# Patient Record
Sex: FEMALE | Race: WHITE | NOT HISPANIC OR LATINO | Employment: OTHER | URBAN - METROPOLITAN AREA
[De-identification: names, ages, dates, MRNs, and addresses within clinical notes are randomized per-mention and may not be internally consistent; named-entity substitution may affect disease eponyms.]

---

## 2018-05-11 ENCOUNTER — HOSPITAL ENCOUNTER (INPATIENT)
Facility: CLINIC | Age: 73
LOS: 3 days | Discharge: NURSING, REHAB OR LONG-TERM CARE | DRG: 603 | End: 2018-05-14
Attending: EMERGENCY MEDICINE | Admitting: INTERNAL MEDICINE
Payer: MEDICARE

## 2018-05-11 DIAGNOSIS — M89.9 DISORDER OF BONE AND CARTILAGE: ICD-10-CM
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
## 2018-05-11 DIAGNOSIS — L03.90 CELLULITIS: ICD-10-CM

## 2018-05-11 DIAGNOSIS — M94.9 DISORDER OF BONE AND CARTILAGE: ICD-10-CM

## 2018-05-11 LAB
ANION GAP SERPL CALCULATED.3IONS-SCNC: 7 MMOL/L (ref 3–14)
BASOPHILS # BLD AUTO: 0 10E9/L (ref 0–0.2)
BASOPHILS NFR BLD AUTO: 0.1 %
BUN SERPL-MCNC: 12 MG/DL (ref 7–30)
CALCIUM SERPL-MCNC: 9.6 MG/DL (ref 8.5–10.1)
CHLORIDE SERPL-SCNC: 104 MMOL/L (ref 94–109)
CO2 SERPL-SCNC: 27 MMOL/L (ref 20–32)
CREAT SERPL-MCNC: 0.81 MG/DL (ref 0.52–1.04)
CRP SERPL-MCNC: 86.8 MG/L (ref 0–8)
DIFFERENTIAL METHOD BLD: ABNORMAL
EOSINOPHIL # BLD AUTO: 0 10E9/L (ref 0–0.7)
EOSINOPHIL NFR BLD AUTO: 0.1 %
ERYTHROCYTE [DISTWIDTH] IN BLOOD BY AUTOMATED COUNT: 13.1 % (ref 10–15)
ERYTHROCYTE [SEDIMENTATION RATE] IN BLOOD BY WESTERGREN METHOD: 15 MM/H (ref 0–30)
GFR SERPL CREATININE-BSD FRML MDRD: 69 ML/MIN/1.7M2
GLUCOSE SERPL-MCNC: 100 MG/DL (ref 70–99)
HCT VFR BLD AUTO: 38 % (ref 35–47)
HGB BLD-MCNC: 12.5 G/DL (ref 11.7–15.7)
IMM GRANULOCYTES # BLD: 0 10E9/L (ref 0–0.4)
IMM GRANULOCYTES NFR BLD: 0.2 %
LACTATE BLD-SCNC: 1.9 MMOL/L (ref 0.7–2)
LYMPHOCYTES # BLD AUTO: 1 10E9/L (ref 0.8–5.3)
LYMPHOCYTES NFR BLD AUTO: 7.9 %
MCH RBC QN AUTO: 30.3 PG (ref 26.5–33)
MCHC RBC AUTO-ENTMCNC: 32.9 G/DL (ref 31.5–36.5)
MCV RBC AUTO: 92 FL (ref 78–100)
MONOCYTES # BLD AUTO: 0.8 10E9/L (ref 0–1.3)
MONOCYTES NFR BLD AUTO: 6.9 %
NEUTROPHILS # BLD AUTO: 10.3 10E9/L (ref 1.6–8.3)
NEUTROPHILS NFR BLD AUTO: 84.8 %
NRBC # BLD AUTO: 0 10*3/UL
NRBC BLD AUTO-RTO: 0 /100
PLATELET # BLD AUTO: 292 10E9/L (ref 150–450)
POTASSIUM SERPL-SCNC: 3.6 MMOL/L (ref 3.4–5.3)
RBC # BLD AUTO: 4.12 10E12/L (ref 3.8–5.2)
SODIUM SERPL-SCNC: 138 MMOL/L (ref 133–144)
WBC # BLD AUTO: 12.1 10E9/L (ref 4–11)

## 2018-05-11 PROCEDURE — 25000125 ZZHC RX 250: Performed by: EMERGENCY MEDICINE

## 2018-05-11 PROCEDURE — 85025 COMPLETE CBC W/AUTO DIFF WBC: CPT | Performed by: EMERGENCY MEDICINE

## 2018-05-11 PROCEDURE — 25000132 ZZH RX MED GY IP 250 OP 250 PS 637: Mod: GY | Performed by: INTERNAL MEDICINE

## 2018-05-11 PROCEDURE — 86140 C-REACTIVE PROTEIN: CPT | Performed by: EMERGENCY MEDICINE

## 2018-05-11 PROCEDURE — 96375 TX/PRO/DX INJ NEW DRUG ADDON: CPT

## 2018-05-11 PROCEDURE — 85652 RBC SED RATE AUTOMATED: CPT | Performed by: EMERGENCY MEDICINE

## 2018-05-11 PROCEDURE — 83605 ASSAY OF LACTIC ACID: CPT | Performed by: EMERGENCY MEDICINE

## 2018-05-11 PROCEDURE — 87040 BLOOD CULTURE FOR BACTERIA: CPT | Performed by: EMERGENCY MEDICINE

## 2018-05-11 PROCEDURE — 25000128 H RX IP 250 OP 636

## 2018-05-11 PROCEDURE — 96367 TX/PROPH/DG ADDL SEQ IV INF: CPT

## 2018-05-11 PROCEDURE — 99222 1ST HOSP IP/OBS MODERATE 55: CPT | Mod: AI | Performed by: INTERNAL MEDICINE

## 2018-05-11 PROCEDURE — 96365 THER/PROPH/DIAG IV INF INIT: CPT

## 2018-05-11 PROCEDURE — 25000128 H RX IP 250 OP 636: Performed by: INTERNAL MEDICINE

## 2018-05-11 PROCEDURE — 80048 BASIC METABOLIC PNL TOTAL CA: CPT | Performed by: EMERGENCY MEDICINE

## 2018-05-11 PROCEDURE — 25000128 H RX IP 250 OP 636: Performed by: EMERGENCY MEDICINE

## 2018-05-11 PROCEDURE — 12000000 ZZH R&B MED SURG/OB

## 2018-05-11 PROCEDURE — 25000125 ZZHC RX 250: Performed by: INTERNAL MEDICINE

## 2018-05-11 PROCEDURE — A9270 NON-COVERED ITEM OR SERVICE: HCPCS | Mod: GY | Performed by: INTERNAL MEDICINE

## 2018-05-11 PROCEDURE — 36415 COLL VENOUS BLD VENIPUNCTURE: CPT

## 2018-05-11 PROCEDURE — 99285 EMERGENCY DEPT VISIT HI MDM: CPT | Mod: 25

## 2018-05-11 RX ORDER — LEVOTHYROXINE SODIUM 88 UG/1
88 TABLET ORAL DAILY
Status: DISCONTINUED | OUTPATIENT
Start: 2018-05-12 | End: 2018-05-14 | Stop reason: HOSPADM

## 2018-05-11 RX ORDER — ONDANSETRON 2 MG/ML
4 INJECTION INTRAMUSCULAR; INTRAVENOUS EVERY 6 HOURS PRN
Status: DISCONTINUED | OUTPATIENT
Start: 2018-05-11 | End: 2018-05-14 | Stop reason: HOSPADM

## 2018-05-11 RX ORDER — NALOXONE HYDROCHLORIDE 0.4 MG/ML
.1-.4 INJECTION, SOLUTION INTRAMUSCULAR; INTRAVENOUS; SUBCUTANEOUS
Status: DISCONTINUED | OUTPATIENT
Start: 2018-05-11 | End: 2018-05-14 | Stop reason: HOSPADM

## 2018-05-11 RX ORDER — ASCORBIC ACID/MULTIVIT-MIN 1000 MG
1 EFFERVESCENT POWDER IN PACKET ORAL DAILY
Status: ON HOLD | COMMUNITY
End: 2022-01-01

## 2018-05-11 RX ORDER — CEFAZOLIN SODIUM 1 G/3ML
1 INJECTION, POWDER, FOR SOLUTION INTRAMUSCULAR; INTRAVENOUS ONCE
Status: DISCONTINUED | OUTPATIENT
Start: 2018-05-11 | End: 2018-05-11

## 2018-05-11 RX ORDER — AMOXICILLIN 250 MG
1 CAPSULE ORAL 2 TIMES DAILY PRN
Status: DISCONTINUED | OUTPATIENT
Start: 2018-05-11 | End: 2018-05-14 | Stop reason: HOSPADM

## 2018-05-11 RX ORDER — ACETAMINOPHEN 325 MG/1
650 TABLET ORAL EVERY 4 HOURS PRN
Status: DISCONTINUED | OUTPATIENT
Start: 2018-05-11 | End: 2018-05-14 | Stop reason: HOSPADM

## 2018-05-11 RX ORDER — VITAMIN E 268 MG
400 CAPSULE ORAL DAILY
Status: DISCONTINUED | OUTPATIENT
Start: 2018-05-12 | End: 2018-05-14 | Stop reason: HOSPADM

## 2018-05-11 RX ORDER — ASPIRIN 81 MG/1
81 TABLET ORAL DAILY
Status: DISCONTINUED | OUTPATIENT
Start: 2018-05-12 | End: 2018-05-14 | Stop reason: HOSPADM

## 2018-05-11 RX ORDER — BUPROPION HYDROCHLORIDE 75 MG/1
150 TABLET ORAL EVERY MORNING
Status: DISCONTINUED | OUTPATIENT
Start: 2018-05-12 | End: 2018-05-14 | Stop reason: HOSPADM

## 2018-05-11 RX ORDER — SODIUM CHLORIDE 9 MG/ML
INJECTION, SOLUTION INTRAVENOUS CONTINUOUS
Status: DISCONTINUED | OUTPATIENT
Start: 2018-05-11 | End: 2018-05-14 | Stop reason: HOSPADM

## 2018-05-11 RX ORDER — POLYETHYLENE GLYCOL 3350 17 G/17G
17 POWDER, FOR SOLUTION ORAL DAILY PRN
Status: DISCONTINUED | OUTPATIENT
Start: 2018-05-11 | End: 2018-05-14 | Stop reason: HOSPADM

## 2018-05-11 RX ORDER — IBUPROFEN 600 MG/1
600 TABLET, FILM COATED ORAL EVERY 6 HOURS PRN
Status: DISCONTINUED | OUTPATIENT
Start: 2018-05-11 | End: 2018-05-14 | Stop reason: HOSPADM

## 2018-05-11 RX ORDER — OXYCODONE AND ACETAMINOPHEN 5; 325 MG/1; MG/1
1-2 TABLET ORAL EVERY 4 HOURS PRN
Status: DISCONTINUED | OUTPATIENT
Start: 2018-05-11 | End: 2018-05-14 | Stop reason: HOSPADM

## 2018-05-11 RX ORDER — CEFTRIAXONE 2 G/1
2 INJECTION, POWDER, FOR SOLUTION INTRAMUSCULAR; INTRAVENOUS EVERY 24 HOURS
Status: DISCONTINUED | OUTPATIENT
Start: 2018-05-12 | End: 2018-05-14

## 2018-05-11 RX ORDER — MORPHINE SULFATE 4 MG/ML
2 INJECTION, SOLUTION INTRAMUSCULAR; INTRAVENOUS ONCE
Status: COMPLETED | OUTPATIENT
Start: 2018-05-11 | End: 2018-05-11

## 2018-05-11 RX ORDER — ONDANSETRON 4 MG/1
4 TABLET, ORALLY DISINTEGRATING ORAL EVERY 6 HOURS PRN
Status: DISCONTINUED | OUTPATIENT
Start: 2018-05-11 | End: 2018-05-14 | Stop reason: HOSPADM

## 2018-05-11 RX ORDER — CEFTRIAXONE 1 G/1
1 INJECTION, POWDER, FOR SOLUTION INTRAMUSCULAR; INTRAVENOUS ONCE
Status: COMPLETED | OUTPATIENT
Start: 2018-05-11 | End: 2018-05-11

## 2018-05-11 RX ORDER — AMOXICILLIN 250 MG
2 CAPSULE ORAL 2 TIMES DAILY PRN
Status: DISCONTINUED | OUTPATIENT
Start: 2018-05-11 | End: 2018-05-14 | Stop reason: HOSPADM

## 2018-05-11 RX ORDER — MORPHINE SULFATE 4 MG/ML
INJECTION, SOLUTION INTRAMUSCULAR; INTRAVENOUS
Status: COMPLETED
Start: 2018-05-11 | End: 2018-05-11

## 2018-05-11 RX ORDER — MORPHINE SULFATE 4 MG/ML
2-4 INJECTION, SOLUTION INTRAMUSCULAR; INTRAVENOUS
Status: DISCONTINUED | OUTPATIENT
Start: 2018-05-11 | End: 2018-05-14 | Stop reason: HOSPADM

## 2018-05-11 RX ADMIN — OXYCODONE HYDROCHLORIDE AND ACETAMINOPHEN 1 TABLET: 5; 325 TABLET ORAL at 18:13

## 2018-05-11 RX ADMIN — METRONIDAZOLE 500 MG: 500 INJECTION, SOLUTION INTRAVENOUS at 22:58

## 2018-05-11 RX ADMIN — MORPHINE SULFATE 2 MG: 4 INJECTION INTRAVENOUS at 12:32

## 2018-05-11 RX ADMIN — TRAZODONE HYDROCHLORIDE 150 MG: 100 TABLET ORAL at 22:58

## 2018-05-11 RX ADMIN — METRONIDAZOLE 500 MG: 500 INJECTION, SOLUTION INTRAVENOUS at 13:32

## 2018-05-11 RX ADMIN — SODIUM CHLORIDE, PRESERVATIVE FREE: 5 INJECTION INTRAVENOUS at 16:26

## 2018-05-11 RX ADMIN — MORPHINE SULFATE 2 MG: 4 INJECTION INTRAVENOUS at 12:52

## 2018-05-11 RX ADMIN — ENOXAPARIN SODIUM 40 MG: 40 INJECTION SUBCUTANEOUS at 18:13

## 2018-05-11 RX ADMIN — CEFTRIAXONE 1 G: 1 INJECTION, POWDER, FOR SOLUTION INTRAMUSCULAR; INTRAVENOUS at 12:52

## 2018-05-11 ASSESSMENT — ENCOUNTER SYMPTOMS
FEVER: 0
COUGH: 0
DIARRHEA: 0
COLOR CHANGE: 1
VOMITING: 0
WOUND: 1

## 2018-05-11 ASSESSMENT — ACTIVITIES OF DAILY LIVING (ADL)
SWALLOWING: 0-->SWALLOWS FOODS/LIQUIDS WITHOUT DIFFICULTY
AMBULATION: 1-->ASSISTIVE EQUIPMENT
FALL_HISTORY_WITHIN_LAST_SIX_MONTHS: YES
BATHING: 1-->ASSISTIVE EQUIPMENT
COGNITION: 0 - NO COGNITION ISSUES REPORTED
DRESS: 0-->INDEPENDENT
TRANSFERRING: 1-->ASSISTIVE EQUIPMENT
TOILETING: 1-->ASSISTIVE EQUIPMENT
NUMBER_OF_TIMES_PATIENT_HAS_FALLEN_WITHIN_LAST_SIX_MONTHS: 1
RETIRED_COMMUNICATION: 0-->UNDERSTANDS/COMMUNICATES WITHOUT DIFFICULTY
RETIRED_EATING: 0-->INDEPENDENT

## 2018-05-11 NOTE — ED NOTES
"[facility]  ED Nurse Handoff Report    ED Chief complaint: Dog Bite (Bit by a dog maybe 3 days ago. has been on antibiotics but has increased swelling )      ED Diagnosis:   Final diagnoses:   None       Code Status: Full Code    Allergies: No Known Allergies    Activity level - Baseline/Home:  Independent    Activity Level - Current:   Independent     Needed?: No    Isolation: No  Infection: Not Applicable    Bariatric?: No    Vital Signs:   Vitals:    05/11/18 1200 05/11/18 1245 05/11/18 1251   BP: 150/69     Resp: 16     Temp: 97.2  F (36.2  C)     TempSrc: Oral     SpO2: 99% 94% 100%   Weight: 49.9 kg (110 lb)     Height: 1.626 m (5' 4\")         Cardiac Rhythm: ,        Pain level: 0-10 Pain Scale: 5    Is this patient confused?: No     Patient Report: Initial Complaint: dog bite  Focused Assessment: Reports R hand pain after dog bite last night. Was seen at  last night, sutured and XR'd which showed 4th metacarpal fx.  Increased swelling, pain and limited movement of fingers this AM.  Started on rocephin and flagyl.  BC x2 pending in lab.  Ortho consulted, no plan for OR will continue AB course.  Placed in a splint in ED.  Morphine for pain.   Tests Performed: blood work  Abnormal Results: WBC 12.1, CRP 86.8  Treatments provided: pain control, splint, AB    Family Comments: friend @ bedside    OBS brochure/video discussed/provided to patient: N/A    ED Medications:   Medications   cefTRIAXone (ROCEPHIN) 1 g vial to attach to  mL bag for ADULTS or NS 50 mL bag for PEDS (1 g Intravenous New Bag 5/11/18 1252)   metroNIDAZOLE (FLAGYL) infusion 500 mg (not administered)   morphine (PF) injection 2 mg (2 mg Intravenous Given 5/11/18 1252)   morphine (PF) 4 MG/ML injection (2 mg  Given 5/11/18 1232)       Drips infusing?:  Yes    For the majority of the shift this patient was Green.   Interventions performed were comfort measures.    Severe Sepsis OR Septic Shock Diagnosis Present: " No      ED NURSE PHONE NUMBER: *[id number]

## 2018-05-11 NOTE — IP AVS SNAPSHOT
` `           [place] [address] [facility]: [phone]                 INTERAGENCY TRANSFER FORM - NOTES (H&P, Discharge Summary, Consults, Procedures, Therapies)   2018                    Hospital Admission Date: 2018  DARIAN ANDUJAR   : 1945  Sex: Female        Patient PCP Information     Provider PCP Type    Patria Shea MD General         History & Physicals      H&P by Soniya Patel MD at 2018  1:40 PM     Author:  Soniya Patel MD Service:  Hospitalist Author Type:  Physician    Filed:  2018  1:58 PM Date of Service:  2018  1:40 PM Creation Time:  2018  1:35 PM    Status:  Signed :  Soniya Patel MD (Physician)         [facility]    History and Physical  Hospitalist       Date of Admission:  2018[PK1.1]    Assessment & Plan[PK1.2]   Darian Andujar is a 73 year old female with history of depression, osteoporosis, hypothyroidism who presents  after a dog bite, yesterday and pain and swelling in her arms and hands.    Right arm and hand cellulitis after a dog bite  - pictures in EPIC ,seen by hand surgery and recommending-Splint and IV antibiotics  -Ceftriaxone and Flagyl initiated in the ED, continue  - pain control with oxycodone, Tylenol and IV morphine as needed, bowel regimen in place   History of depression  -resume PTA antidepressant ,once verified by pharmacy     Osteoporosis  -Patient had recent fall and hip fracture.  Currently uses walker.  PT OT to follow while inhouse    Hypothyroidism  -Resume PTA Synthroid once verified by pharmacy    # Pain Assessment:[PK1.1]  Current Pain Score 2018   Pain score (0-10) 5[PK1.2]   - Darian is experiencing pain due to Dog bite and cellulitis. Pain management was discussed and the plan was created in a collaborative fashion.  Darian's response to the current recommendations: engaged  - Opioid regimen: IV morphine, oxycodone  - Response to opioid medications: Reduction of symptoms  .  - Bowel regimen: miralax and docusate        DVT Prophylaxis: Enoxaparin (Lovenox) SQ  Code Status: Full Code    Disposition: Expected discharge in 2-3 days once clinical improvement and clearance by hand surgery..[PK1.1]    Soniya Patel[PK1.2], MD[PK1.1]    Primary Care Physician   [facility]    Chief Complaint[PK1.2]   Hand pain and swelling    History is obtained from the patient[PK1.1]    History of Present Illness[PK1.2]   Mariposa Mendez is a 73 year old female with history of depression, osteoporosis, hypothyroidism who presents  after a dog bite, yesterday and pain and swelling in her arms and hands.      The patient had a dog bite by a neighbor's dog(who is upto date on vaccination) yesterday.  She went to urgent care yesterday.   Her wound were repaired with Dermabond.  She was given tetanus shots and she was prescribed Augmentin, however had not started so far.  In the afternoon she started experiencing increased pain and swelling in her arm.  She was unable to sleep last night.  So she presented to the ED today.  Patient denies any fever, nausea vomiting, chest pain, headache, dizziness, lightheadedness, changes in bowel or bladder habits.    In the ED she was seen by Dr Allison and Dr Muñoz(hand surgery). Labs positive for mild leukocytosis of 12.1.  Vitals were unremarkable except for some minimal hypertension.  Blood cultures were sent and she was started on ceftriaxone and Flagyl upon recommendation from surgery.[PK1.1]    Past Medical History[PK1.2]    I have reviewed this patient's medical history and updated it with pertinent information if needed.[PK1.1]   Past Medical History:   Diagnosis Date     Depressive disorder, not elsewhere classified      Generalized osteoarthrosis, unspecified site      OSTEOPENIA OF HIP      Other and unspecified hyperlipidemia      Unspecified hypothyroidism[PK1.3]        Past Surgical History[PK1.2]   I have reviewed this patient's surgical history and  updated it with pertinent information if needed.[PK1.1]  Past Surgical History:   Procedure Laterality Date     SURGICAL HISTORY OF -   age 39 or 40    SUSANNAH and unilateral ooph (fibroids)     SURGICAL HISTORY OF -   2005    right foot surgery with hardware[PK1.3]       Prior to Admission Medications   Prior to Admission Medications   Prescriptions Last Dose Informant Patient Reported? Taking?   ASPIRIN 81 MG OR TABS   No No   Si tab po QD (Once per day)   CELEXA 20 MG OR TABS   Yes No   Si TABLET DAILY   LEVOXYL 100 MCG OR TABS   No No   Sig: ONE DAILY IN THE MORNING   TRAZODONE HCL 50 MG OR TABS   No No   Si - 2 TABs PO at bedtime prn insomnia      Facility-Administered Medications: None     Allergies   No Known Allergies    Social History[PK1.2]   I have reviewed this patient's social history and updated it with pertinent information if needed. Mariposa Mendez[PK1.1]  reports that she has been smoking Cigarettes.  She has a 15.00 pack-year smoking history. She has never used smokeless tobacco. She reports that she drinks alcohol.[PK1.3]    Family History[PK1.2]   I have reviewed this patient's family history and updated it with pertinent information if needed.[PK1.1]   Family History   Problem Relation Age of Onset     Breast Cancer Mother      Dx age 70     Arthritis Mother      OA     Arthritis Maternal Grandfather      Lipids Mother      Lipids Sister      Ericka. 1/2 sibling     Hypertension Mother      Thyroid Disease Sister      Ercika. 1/2 sibling     Psychotic Disorder Brother      Bipolar. (1/2 sibling)     Family History Negative Daughter      Lipids Sister      Marian.      Psychotic Disorder Mother      depression     Psychotic Disorder Sister      Ericka. 1/2 sib. Depression     Psychotic Disorder Sister      Marian. 1/2 sib. Depression[PK1.4]       Review of Systems[PK1.2]   The 10 point Review of Systems is negative other than noted in the HPI or here.   Patient had a recent hip fracture  after a fall and currently uses a walker for ambulation.[PK1.1]    Physical Exam   Temp: 97.2  F (36.2  C) Temp src: Oral BP: 150/69   Heart Rate: 72 Resp: 16 SpO2: 100 % O2 Device: None (Room air)[PK1.2]    Vital Signs with Ranges[PK1.1]  Temp:  [97.2  F (36.2  C)] 97.2  F (36.2  C)  Heart Rate:  [72] 72  Resp:  [16] 16  BP: (150)/(69) 150/69  SpO2:  [94 %-100 %] 100 %  110 lbs 0 oz[PK1.2]    Exam:  Constitutional: Awake, alert and no distress. Appears comfortable  Head: Normocephalic. No masses, lesions, tenderness or abnormalities  ENT: ENT exam normal, no neck nodes or sinus tenderness  Cardiovascular: RRR.  no murmurs, no rubs or JVD  Respiratory:normal WOB,b/l equal air entry, no wheezes or crackles   Gastrointestinal: Abdomen soft, non-tender. BS normal. No masses, organomegaly  : Deferred   extremities :erythema and swelling in the right arm and hand( see pictures in ED provider note)- was already splinted by the time she was evaluated by me,no edema , no clubbing or cyanosis    Musculoakeletal :right wrist joint swelling otherwise no , erythema . No obvious deformity   Skin: Warm and dry, no rash  Neurologic: Cranial nerves II-XII grossly intact , power 5/5, Reflexes normal and symmetric. Sensation grossly WNL.[PK1.1]    Data[PK1.2]   Data reviewed today:  I personally reviewed the hand image(s) showing There is moderately severe soft tissue swelling of the dorsal aspect of the hand with air within the soft tissues. Additional more mild swelling and air within the soft tissues of the dorsal distal forearm. There is a minimally displaced oblique fracture involving the fourth metacarpal proximally without definitive intra-articular extension. No additional acute fracture or dislocation. Advanced degenerative changes of the base of the thumb and second and third distal interphalangeal joints additional scattered more mild to moderate degenerative changes of the hand and wrist. Dense sclerotic foci adjacent  to the ulnar styloid process may be due to old injury..from care everywhere[PK1.1]    Recent Labs  Lab 05/11/18  1220   WBC 12.1*   HGB 12.5   MCV 92         POTASSIUM 3.6   CHLORIDE 104   CO2 27   BUN 12   CR 0.81   ANIONGAP 7   JAYE 9.6   *       No results found for this or any previous visit (from the past 24 hour(s)).[PK1.2]     Revision History        User Key Date/Time User Provider Type Action    > PK1.4 5/11/2018  1:58 PM Soniya Patel MD Physician Sign     PK1.3 5/11/2018  1:50 PM Soniya Patel MD Physician      PK1.2 5/11/2018  1:40 PM Soniya Patel MD Physician      PK1.1 5/11/2018  1:35 PM Soniya Patel MD Physician                   Discharge Summaries     No notes of this type exist for this encounter.         Consult Notes      Consults signed by Isaac Muñoz MD at 5/11/2018  3:54 PM      Author:  Isaac Muñoz MD Service:  Orthopedics Author Type:  Physician    Filed:  5/11/2018  3:54 PM Date of Service:  5/11/2018 12:48 PM Creation Time:  5/11/2018  1:13 PM    Status:  Signed :  Isaac Muñoz MD (Physician)         Consult Date:  05/11/2018      CHIEF COMPLAINT:  Right hand pain.      HISTORY OF PRESENT ILLNESS:  Mariposa Mendez is a 73-year-old right-handed retired woman.  I have been asked to see her at the request of Dr. Farnaz Allison for a hand surgical consultation regarding a problem with her right hand.      Last night this lady was bitten by her neighbor's dog over multiple spots involving her right hand.  She was seen at a local urgent care clinic this morning where her wounds were cleansed, a dorsal wrist laceration was sutured and Dermabond was applied to the remainder of her wounds.  She was not given any antibiotics.  X-rays also reveal a fourth metacarpal fracture.      Nevertheless, over the last 24 hours the patient has noted increasing pain, redness and swelling in her hand.  She presents to the emergency room at this time for more definitive  treatment.      At the present time, Ms. Mendez complains of rather diffuse pain in her hand and some nonspecific tingling along its dorsal aspect.  This was an isolated injury.  No prior problems with the area.      PAST MEDICAL HISTORY:  Depression, osteoarthritis, hyperlipidemia, hypothyroidism.      MEDICATIONS ON ADMISSION:   1.  Aspirin.   2.  Celexa.   3.  Levoxyl.   4.  Trazodone.      ALLERGIES:  NO KNOWN DRUG ALLERGIES.      PRIOR SURGERIES:  Hysterectomy and some type of operation on her right foot.      I reviewed this lady's patient profile, complete review of systems,  family and social histories as documented in the EMR and in her emergency room notes.      PHYSICAL EXAMINATION:   GENERAL:  Reveals a 73-year-old right-handed woman who is alert and oriented to time, place and person.  Skin is warm and dry.  She is in mild distress because of right hand pain.   VITAL SIGNS:  Temperature 97.2, blood pressure 150/69, pulse 72, respirations 16.   LUNGS: There is no pulmonary difficulty.     EXTREMITIES: No adenopathy in the right upper extremity.  Examination  of the right hand reveals diffuse low-grade swelling along the dorsal aspect.  There is some nonspecific erythema on the dorsal hand extending up into the dorsal proximal forearm.  She has a neatly-sutured 3 cm transverse laceration along the dorsal wrist without drainage.  There are multiple small puncture wounds both dorsally and volarly in the hand without significant drainage.  Swelling appears more interstitial rather than fluctuant.  Swelling extends out into the fingers, and both digital and wrist motion are limited by pain.  Neurovascular exam is intact.  The left hand has no swelling, tenderness or limitation of motion.     LAB:  WBC  12.1    IMAGING:  X-rays of the right hand (3 views) taken at [facility] urgent care earlier today demonstrate a minimally-angulated fracture of the proximal ring metacarpal.      DIAGNOSES:     1.  Dog  bite, right hand, with cellulitis.     2.  Right IV metacarpal fracture.      At the present time, I believe that this patient's condition is best managed with a course of IV antibiotics.  I spoke with the ER physician about this.  We will start with some IV Rocephin and Flagyl.  We will splint her hand.  This is not an open fracture, and I do not believe at the present time that anything requires debridement.      Ms. Andujar will elevate her hand, and I will see her in followup to monitor her clinical course.         JAROD LR MD             D: 2018   T: 2018   MT: [id number]      Name:     DARIAN ADNUJAR   MRN:      [id number]        Account:       [id number]   :      1945           Consult Date:  2018      Document: [id number]       cc: Ramone Lr MD[MU1.1]        Revision History        User Key Date/Time User Provider Type Action    > MU1.1 2018  3:54 PM Jarod Lr MD Physician Sign     [N/A] 2018  1:13 PM Jarod Lr MD Physician Edit            Consults by Jarod Lr MD at 2018 12:52 PM     Author:  Jarod Lr MD Service:  Orthopedics Author Type:  Physician    Filed:  2018 12:52 PM Date of Service:  2018 12:52 PM Creation Time:  2018 12:48 PM    Status:  Signed :  Jarod Lr MD (Physician)         HAND SURGERY CONSULT:    72 y/o RHD retired woman, bitten on her right hand by a neighbor's dog last evening.  Has noted increased pain, swelling and redness in her hand.  Is admitted for observation and a course of IV antibiotics.    PE:  Multiple small puncture wounds R dorsal and volar hand.  Erythema dorsal hand to distal dorsal forearm.  Moderate dorsal swelling, more interstitial than fluctuant.  NVI.    X-RAYS:  Minimally angulated ring metacarpal fx.    IMPRESSION:  (1) R hand cellulitis secondary to dog bite; (2) R ring metacarpal fx.    PLAN:  IV antibiotics.  Splint.  Observation.    I will follow  with you.[MU1.1]     Revision History        User Key Date/Time User Provider Type Action    > MU1.1 5/11/2018 12:52 PM Isaac Muñoz MD Physician Sign                     Progress Notes - Physician (Notes from 05/11/18 through 05/14/18)      Progress Notes by Sid Echavarria OT at 5/14/2018 10:42 AM     Author:  Sid Echavarria OT Service:  Acute IP Rehab Author Type:  Occupational Therapist    Filed:  5/14/2018 10:42 AM Date of Service:  5/14/2018 10:42 AM Creation Time:  5/14/2018 10:42 AM    Status:  Signed :  Sid Echavarria OT (Occupational Therapist)          05/14/18 0818   Quick Adds   Type of Visit Initial Occupational Therapy Evaluation   Living Environment   Lives With alone   Living Arrangements house   Home Accessibility stairs to enter home;stairs within home   Number of Stairs to Enter Home 3   Number of Stairs Within Home 12   Transportation Available family or friend will provide   Self-Care   Dominant Hand right   Usual Activity Tolerance good   Current Activity Tolerance moderate   Regular Exercise yes   Activity/Exercise Type strength training   Equipment Currently Used at Home walker, rolling   Activity/Exercise/Self-Care Comment Has home PT 1x/wk   Functional Level Prior   Ambulation 1-->assistive equipment   Transferring 1-->assistive equipment   Toileting 1-->assistive equipment   Bathing 1-->assistive equipment   Dressing 0-->independent   Eating 0-->independent   Communication 0-->understands/communicates without difficulty   Swallowing 0-->swallows foods/liquids without difficulty   Cognition 0 - no cognition issues reported   Fall history within last six months no   Number of times patient has fallen within last six months 1   General Information   Onset of Illness/Injury or Date of Surgery - Date 05/11/18   Referring Physician Dr. Isaac Muñoz   Patient/Family Goals Statement return home   Additional Occupational Profile Info/Pertinent History of Current Problem 72 y/o female  admitted after sustaining a R hand dog bite with cellulitis and R 4th metacarpal fracture.     Cognitive Status Examination   Cognitive Comment grossly intact   Pain Assessment   Patient Currently in Pain (R wrist/hand with movement only)   Range of Motion (ROM)   ROM Comment LUE, R gunner and elb WNL. Difficulty tolerating any PROM to 2nd thru 4th digits. R thumb AROM WFL.   Strength   Strength Comments LUE WNL, R gunner 4/5, elb 5/5 - hand not tested   Hand Strength   Hand Strength Comments L hand strength WNL   Coordination   Coordination Comments L WNL - unable to use R hand functionally   Mobility   Bed Mobility Comments See PT eval for mobility assessment   Upper Body Dressing   Level of Reagan: Dress Upper Body moderate assist (50% patients effort)   Lower Body Dressing   Level of Reagan: Dress Lower Body minimum assist (75% patients effort)   Toileting   Level of Reagan: Toilet stand-by assist   Grooming   Level of Reagan: Grooming stand-by assist   Physical Assist/Nonphysical Assist: Grooming set-up required   Eating/Self Feeding   Level of Reagan: Eating independent   Physical Assist/Nonphysical Assist: Eating (L hand)   Instrumental Activities of Daily Living (IADL)   Previous Responsibilities meal prep;housekeeping;laundry;shopping;yardwork;medication management;finances;driving   Activities of Daily Living Analysis   Impairments Contributing to Impaired Activities of Daily Living pain;ROM decreased;strength decreased;coordination impaired  (dominant R hand)   General Therapy Interventions   Planned Therapy Interventions ADL retraining;IADL retraining;home program guidelines   Clinical Impression   Criteria for Skilled Therapeutic Interventions Met yes, treatment indicated   OT Diagnosis decreased ADL performance   Influenced by the following impairments dominant R hand laceration/fracture, pain, edema   Assessment of Occupational Performance 3-5 Performance Deficits   Identified  "Performance Deficits dressing, grooming, household mgmt, community mobility   Clinical Decision Making (Complexity) Low complexity   Predicted Duration of Therapy Intervention (days/wks) eval and one treatment only   Anticipated Discharge Disposition Home with Assist   Risks and Benefits of Treatment have been explained. Yes   Patient, Family & other staff in agreement with plan of care Yes   [facility] TM \"6 Clicks\"   2016, Trustees of [employer], under license to [employer].  All rights reserved.   6 Clicks Short Forms Daily Activity Inpatient Short Form   [facility]  \"6 Clicks\" Daily Activity Inpatient Short Form   1. Putting on and taking off regular lower body clothing? 4 - None   2. Bathing (including washing, rinsing, drying)? 3 - A Little   3. Toileting, which includes using toilet, bedpan or urinal? 4 - None   4. Putting on and taking off regular upper body clothing? 3 - A Little   5. Taking care of personal grooming such as brushing teeth? 4 - None   6. Eating meals? 4 - None   Daily Activity Raw Score (Score out of 24.Lower scores equate to lower levels of function) 22   Total Evaluation Time   Total Evaluation Time (Minutes) 15[MR1.1]        Revision History        User Key Date/Time User Provider Type Action    > MR1.1 5/14/2018 10:42 AM Sid Echavarria OT Occupational Therapist Sign            Progress Notes by Esau Greene MD at 5/12/2018 10:49 AM     Author:  Esau Greene MD Service:  Hospitalist Author Type:  Physician    Filed:  5/14/2018  8:53 AM Date of Service:  5/12/2018 10:49 AM Creation Time:  5/12/2018 10:49 AM    Status:  Addendum :  Esau Greene MD (Physician)         [facility]  Hospitalist Progress Note  Esau Greene MD    Assessment & Plan   Mariposa Mendez is a 73 year old female with PMHx significant for depression, osteoporosis, and hypothyroidism who presented for evaluation after a dog " bite.     Dog bite with[MA1.1] right hand[MA1.2] cellulitis,  Right 4th metacarpal fracture:  Patient was bitten by her neighbor's dog over multiple spots involving her right hand[MA1.1] on 05/10[MA1.2].  She was seen at a local urgent care clinic on 05/1[MA1.1]0[MA1.3] and again on 05/11[MA1.2] where her wounds were cleansed, a dorsal wrist laceration was sutured and Dermabond was applied to the remainder of her wounds.   X-ray also showed minimally displaced oblique fracture involving the fourth metacarpal proximally without definitive intra-articular extension. WBC 12.1 on admission, then normalized.[MA1.1] Patient was given tetanus shot in the urgent care on 05/10. She was started on IV ceftriaxone and metronidazole in ED on 05/11 and admitted for further management.[MA1.2]      Recent Labs  Lab 05/12/18  0832 05/11/18  1220   WBC 9.2 12.1*[MA1.4]       -Cont IV[MA1.1] c[MA1.2]eftriaxone and[MA1.1] metronidazole[MA1.2]  -Cont splint  -PT assessment  -Pain control with prn oxycodone, Tylenol and IV morphine  -Ortho is following and appreciate assist with management    History of depression  Chronic and stable on PTA bupropion and fluoxetine     Hypothyroidism  Chronic and stable on PTA Synthroid      # Pain Assessment:  Current Pain Score 5/12/2018   Patient currently in pain? -   Pain score (0-10) 7   Pain location -   Pain descriptors -   - Mariposa is experiencing pain due to dog bite and cellulitis. Pain management was discussed and the plan was created in a collaborative fashion.  Mariposa's response to the current recommendations: engaged  - Opioid regimen:  IV morphine, oxycodone  - Response to opioid medications: Reduction of symptoms   - Bowel regimen: miralax and docusate      Active Diet Order      Combination Diet Regular Diet Adult     DVT Prophylaxis: Enoxaparin (Lovenox) SQ    Code Status: Full Code     Disposition: Expected discharge in[MA1.1] 1-2[MA1.2] days[MA1.1] pending clinical course.    D/W  RN.[MA1.2]    Interval History[MA1.1]   Patient continued to have pain in her right hand. She reports no fever. Chills, or other complaints. No new issue since admit.[MA1.2]    Data reviewed today: I reviewed all new labs and imaging over the last 24 hours.    Physical Exam   Temp: 98.2  F (36.8  C) Temp src: Oral BP: 117/57 Pulse: 77 Heart Rate: 77 Resp: 18 SpO2: 97 % O2 Device: None (Room air)    Vitals:    05/11/18 1200 05/11/18 1529   Weight: 49.9 kg (110 lb) 49.9 kg (110 lb)     Vital Signs with Ranges  Temp:  [97.2  F (36.2  C)-98.5  F (36.9  C)] 98.2  F (36.8  C)  Pulse:  [77] 77  Heart Rate:  [72-79] 77  Resp:  [16-18] 18  BP: (113-150)/(57-80) 117/57  SpO2:  [94 %-100 %] 97 %  I/O's Last 24 hours  I/O last 3 completed shifts:  In: 240 [P.O.:240]  Out: -     Constitutional: Comfortable, no acute distress  HEENT: No conjunctival pallor.  Neurologic: Awake, alert[MA1.1], fully oriented[MA1.2]  Neck: Supple, no elevated JVP  Respiratory: Clear to auscultation  Cardiovascular: Normal S1 and S2. Regular rhythm and rate  GI: Abdomen soft, non tender, non distended  Extremities: No calf tenderness,[MA1.1] right hand swelling[MA1.2]  Skin/integument:[MA1.1] Right hand wounds are dressed[MA1.2]    Medications[MA1.1]   All medications were reviewed.[MA1.2]    sodium chloride 75 mL/hr at 05/11/18 1626       aspirin  81 mg Oral Daily     buPROPion (WELLBUTRIN) tablet 150 mg  150 mg Oral QAM     cefTRIAXone  2 g Intravenous Q24H     cholecalciferol  1,000 Units Oral Daily     enoxaparin  40 mg Subcutaneous Q24H     FLUoxetine (PROzac) capsule 40 mg  40 mg Oral QAM     levothyroxine (SYNTHROID/LEVOTHROID) tablet 88 mcg  88 mcg Oral Daily     metroNIDAZOLE  500 mg Intravenous Q8H     traZODone (DESYREL) tablet 150 mg  150 mg Oral At Bedtime     vitamin E  400 Units Oral Daily        Data     Recent Labs  Lab 05/12/18  0832 05/11/18  1220   WBC 9.2 12.1*   HGB 11.0* 12.5   MCV 93 92    292    138   POTASSIUM  3.7 3.6   CHLORIDE 108 104   CO2 21 27   BUN 12 12   CR 0.75 0.81   ANIONGAP 9 7   JAYE 8.1* 9.6   * 100*       No results found for this or any previous visit (from the past 24 hour(s)).[MA1.1]         Revision History        User Key Date/Time User Provider Type Action    > MA1.3 5/14/2018  8:53 AM Esau Greene MD Physician Addend     MA1.2 5/12/2018 11:11 AM Esau Greene MD Physician Sign     MA1.4 5/12/2018 10:57 AM Esau Greene MD Physician      MA1.1 5/12/2018 10:49 AM Esau Greene MD Physician             Progress Notes by Esau Greene MD at 5/13/2018 10:40 AM     Author:  Esau Greene MD Service:  Hospitalist Author Type:  Physician    Filed:  5/14/2018  8:53 AM Date of Service:  5/13/2018 10:40 AM Creation Time:  5/13/2018 10:40 AM    Status:  Addendum :  Esau Greene MD (Physician)         [facility]  Hospitalist Progress Note  Esau Greene MD    Assessment & Plan   Mariposa Mendez is a 73 year old female with PMHx significant for depression, osteoporosis, and hypothyroidism who presented for evaluation after a dog bite.     Dog bite with right hand cellulitis with abscess,  Right 4th metacarpal fracture:  Patient was bitten by her neighbor's dog over multiple spots involving her right hand on 05/10.  She was seen at a local urgent care clinic on 05/1[MA1.1]0[MA1.2] and again on 05/11 where her wounds were cleansed, a dorsal wrist laceration was sutured and Dermabond was applied to the remainder of her wounds. X-ray also showed minimally displaced oblique fracture involving the fourth metacarpal proximally without definitive intra-articular extension. WBC 12.1 on admission, then normalized. Patient was given tetanus shot in the urgent care on 05/10. She was started on IV ceftriaxone and metronidazole in ED on 05/11 and admitted for further management. She was seen by ortho and MRI obtained 05/13 which demonstrated fluid  collection along the volar aspect of the ulna possibly associated with the distal radioulnar joint or radiocarpal joint. It also showed 1.0 x 0.5 x 1.1 cm low signal intensity structure within the fluid volar to the ulna, possibly a large articular body versus a foreign body. There[MA1.1] was[MA1.3] also extensive soft tissue edema in the hand, suspe[MA1.1]cted e[MA1.3]xtensor tendon sheath complex fluid[MA1.1], and[MA1.3] fourth metacarpal proximal metaphyseal nondisplaced or minimally displaced fracture.    Blood cultures remain negative.[MA1.1] Pain is controlled.[MA1.3]      Recent Labs  Lab 05/12/18  0832 05/11/18  1220   WBC 9.2 12.1*       -Cont IV ceftriaxone and metronidazole  -Cont splint  -PT assessment[MA1.1] done and suggested home PT.[MA1.3]  -Pain control with prn oxycodone, Tylenol and IV morphine  -Ortho is following and appreciate assist with management. Patient likely needs I&D.[MA1.1]  -CBC, Cr in am[MA1.3]    History of depression  Chronic and stable on PTA bupropion and fluoxetine     Hypothyroidism  Chronic and stable on PTA Synthroid      # Pain Assessment:  Current Pain Score 5/13/2018   Patient currently in pain? sleeping: patient not able to self report   Pain score (0-10) -   Pain location -   Pain descriptors -   - Mariposa is experiencing pain due to dog bite and cellulitis. Pain management was discussed and the plan was created in a collaborative fashion.  Mariposa's response to the current recommendations: engaged  - Opioid regimen:  IV morphine, oxycodone  - Response to opioid medications: Reduction of symptoms   - Bowel regimen: miralax and docusate      Active Diet Order      Combination Diet Regular Diet Adult     DVT Prophylaxis: Enoxaparin (Lovenox) SQ    Code Status: Full Code     Disposition: Expected discharge[MA1.1] pending clinical course and possible I&D. Anticipate 1-2+ days of inpatient management.[MA1.3]    D/W RN.    Interval History   Patient[MA1.1] reports less[MA1.3] pain  in her right hand. She[MA1.1] denies[MA1.3] fever. Chills, or other complaints. No new issue o[MA1.1]vernight.[MA1.3]    Data reviewed today: I reviewed all new labs and imaging over the last 24 hours.    Physical Exam   Temp: 98  F (36.7  C) Temp src: Oral BP: 127/81 Pulse: 83 Heart Rate: 78 Resp: 18 SpO2: 96 % O2 Device: None (Room air)    Vitals:    05/11/18 1200 05/11/18 1529   Weight: 49.9 kg (110 lb) 49.9 kg (110 lb)     Vital Signs with Ranges  Temp:  [97.7  F (36.5  C)-98  F (36.7  C)] 98  F (36.7  C)  Pulse:  [83] 83  Heart Rate:  [68-78] 78  Resp:  [16-18] 18  BP: (126-141)/(53-85) 127/81  SpO2:  [96 %-97 %] 96 %  I/O's Last 24 hours  I/O last 3 completed shifts:  In: 1111 [P.O.:440; I.V.:671]  Out: -     Constitutional: Comfortable, no acute distress  HEENT: No conjunctival pallor.  Neurologic: Awake, alert, fully oriented  Neck: Supple, no elevated JVP  Respiratory: Clear to auscultation  Cardiovascular: Normal S1 and S2. Regular rhythm and rate  GI: Abdomen soft, non tender, non distended  Extremities: No calf tenderness, right hand swelling  Skin/integument: Right hand wounds are dressed    Medications   All medications were reviewed.    sodium chloride 75 mL/hr at 05/12/18 2111       aspirin  81 mg Oral Daily     buPROPion (WELLBUTRIN) tablet 150 mg  150 mg Oral QAM     cefTRIAXone  2 g Intravenous Q24H     cholecalciferol  1,000 Units Oral Daily     enoxaparin  40 mg Subcutaneous Q24H     FLUoxetine (PROzac) capsule 40 mg  40 mg Oral QAM     levothyroxine (SYNTHROID/LEVOTHROID) tablet 88 mcg  88 mcg Oral Daily     metroNIDAZOLE  500 mg Intravenous Q8H     traZODone (DESYREL) tablet 150 mg  150 mg Oral At Bedtime     vitamin E  400 Units Oral Daily        Data     Recent Labs  Lab 05/12/18  0832 05/11/18  1220   WBC 9.2 12.1*   HGB 11.0* 12.5   MCV 93 92    292    138   POTASSIUM 3.7 3.6   CHLORIDE 108 104   CO2 21 27   BUN 12 12   CR 0.75 0.81   ANIONGAP 9 7   JAYE 8.1* 9.6   *  100*       Recent Results (from the past 24 hour(s))   MR Hand Right w/o Contrast    Narrative    MR HAND RIGHT WITHOUT CONTRAST May 12, 2018 8:31 PM     HISTORY: Dog bite to hand. Rule out abscess, has ring metacarpal  fracture.     TECHNIQUE: Coronal T1, fat-suppressed T1, fat-suppressed T2, and  inversion recovery, sagittal T1, and transverse T1 and fat-suppressed  T2-weighted pulse sequences are submitted. No contrast-enhanced images  are submitted.    COMPARISON: No radiographs are available for comparison.    FINDINGS: There is a nondisplaced or minimally displaced transverse  oblique fracture involving the fourth metacarpal proximal metaphysis.  No extension into the carpometacarpal joint is demonstrated.  Degenerative arthrosis with mild subchondral marrow edema is noted at  the first carpometacarpal joint. Scattered cysts or erosions are noted  within the carpal bones, not well evaluated with this scan. Along the  volar aspect of the distal ulna, there is an irregular shaped low  signal intensity object measuring 1.0 x 0.5 x 1.1 cm, possibly a  distal radioulnar joint or radiocarpal joint articular body versus a  foreign body, also not well seen on the edge of the scan. Adjacent  fluid is noted which may be related to a recess of the radiocarpal or  distal radioulnar joint. Extensive soft tissue edema is noted,  greatest along the dorsum of the hand, but also present within the  volar soft tissues. Complex extensor tendon sheath fluid is suggested.  This might be better delineated with contrast enhancement if  clinically warranted. However, there is no other evidence of discrete  soft tissue fluid collection or abscess. Visualized portions of the  flexor and extensor tendons appear to be grossly intact.      Impression    IMPRESSION:  1. Fluid collection along the volar aspect of the ulna possibly  associated with the distal radioulnar joint or radiocarpal joint.  2. 1.0 x 0.5 x 1.1 cm low signal intensity  structure within the fluid  volar to the ulna, possibly a large articular body versus a foreign  body. Radiographic correlation is advised.  3. Extensive soft tissue edema in the hand, greatest dorsally but also  present in the volar soft tissues. Extensor tendon sheath complex  fluid is suspected and might be better delineated with contrast  enhancement if clinically warranted. However, there is no other  definitive evidence of soft tissue abscess.  4. Fourth metacarpal proximal metaphyseal nondisplaced or minimally  displaced fracture.  5. Nonspecific cysts or erosions in multiple carpal bones, not well  evaluated with this scan.  6. First carpometacarpal joint degenerative arthrosis.[MA1.1]          Revision History        User Key Date/Time User Provider Type Action    > MA1.2 5/14/2018  8:53 AM Esau Greene MD Physician Addend     MA1.3 5/13/2018 11:03 AM Esau Greene MD Physician Sign     MA1.1 5/13/2018 10:40 AM Esau Greene MD Physician             Progress Notes by Esau Greene MD at 5/14/2018  8:17 AM     Author:  Esau Greene MD Service:  Hospitalist Author Type:  Physician    Filed:  5/14/2018  8:53 AM Date of Service:  5/14/2018  8:17 AM Creation Time:  5/14/2018  8:17 AM    Status:  Addendum :  Esau Greene MD (Physician)         [facility]  Hospitalist Progress Note  Esau Greene MD    Assessment & Plan   Mariposa Mendez is a 73 year old female with PMHx significant for depression, osteoporosis, and hypothyroidism who presented for evaluation after a dog bite.     Dog bite with right hand cellulitis,  Right 4th metacarpal fracture:  Patient was bitten by her neighbor's dog over multiple spots involving her right hand on 05/10.  She was seen at a local urgent care clinic on 05/1[MA1.1]0[MA1.2] and again on 05/11 where her wounds were cleansed, a dorsal wrist laceration was sutured and Dermabond was applied to the remainder of her  wounds. X-ray also showed minimally displaced oblique fracture involving the fourth metacarpal proximally without definitive intra-articular extension. WBC 12.1 on admission, then normalized. Patient was given tetanus shot in the urgent care on 05/10. She was started on IV ceftriaxone and metronidazole in ED on 05/11 and admitted for further management. She was seen by ortho and MRI obtained 05/13 which demonstrated fluid collection along the volar aspect of the ulna possibly associated with the distal radioulnar joint or radiocarpal joint. It also showed 1.0 x 0.5 x 1.1 cm low signal intensity structure within the fluid volar to the ulna, possibly a large articular body versus a foreign body. There was also extensive soft tissue edema in the hand, suspected extensor tendon sheath complex fluid, and fourth metacarpal proximal metaphyseal nondisplaced or minimally displaced fracture.    Blood cultures remain negative. Pain is controlled.      Recent Labs  Lab 05/12/18  0832 05/11/18  1220   WBC 9.2 12.1*       -[MA1.1]D/C[MA1.3] IV ceftriaxone and metronidazole[MA1.1] and start Augmentin for 10 days.[MA1.3]  -Cont splint  -PT assessment done and suggested home PT.  -Pain control with prn oxycodone, Tylenol and IV morphine  -Ortho is following and appreciate assist with management. Plan for follow up[MA1.1]  On 05/16.[MA1.3]    History of depression  Chronic and stable on PTA bupropion and fluoxetine     Hypothyroidism  Chronic and stable on PTA Synthroid      # Pain Assessment:  Current Pain Score 5/14/2018   Patient currently in pain? -   Pain score (0-10) 4   Pain location -   Pain descriptors -   - Mariposa is experiencing pain due to dog bite and cellulitis. Pain management was discussed and the plan was created in a collaborative fashion.  Mariposa's response to the current recommendations: engaged  - Opioid regimen:  IV morphine, oxycodone  - Response to opioid medications: Reduction of symptoms   - Bowel regimen:  miralax and docusate      Active Diet Order      Combination Diet Regular Diet Adult     DVT Prophylaxis: Enoxaparin (Lovenox) SQ    Code Status: Full Code     Disposition:[MA1.1] Home today with home RN and OT.[MA1.3]    D/W R[MA1.1]N and care coordinator.[MA1.3]    Interval History[MA1.1]   Pain is controlled with prn acetaminophen.[MA1.3] She denies fever. Chills, or other complaints. No new issue overnight.    Data reviewed today: I reviewed all new labs and imaging over the last 24 hours.    Physical Exam   Temp: 98.2  F (36.8  C) Temp src: Oral BP: 131/85   Heart Rate: 75 Resp: 16 SpO2: 96 % O2 Device: None (Room air)    Vitals:    05/11/18 1200 05/11/18 1529   Weight: 49.9 kg (110 lb) 49.9 kg (110 lb)     Vital Signs with Ranges  Temp:  [97.6  F (36.4  C)-98.4  F (36.9  C)] 98.2  F (36.8  C)  Heart Rate:  [67-79] 75  Resp:  [16-18] 16  BP: (119-131)/(69-85) 131/85  SpO2:  [93 %-96 %] 96 %  I/O's Last 24 hours  I/O last 3 completed shifts:  In: 1790 [P.O.:570; I.V.:1220]  Out: -     Constitutional: Comfortable, no acute distress  HEENT: No conjunctival pallor.[MA1.1]  N[MA1.3]eurologic: Awake, alert, fully oriented  Extremities: No calf tenderness,[MA1.1] much decreased[MA1.3] right hand swelling  Skin/integument: Right hand wounds are dressed    Medications   All medications were reviewed.    sodium chloride 75 mL/hr at 05/14/18 0414       aspirin  81 mg Oral Daily     buPROPion (WELLBUTRIN) tablet 150 mg  150 mg Oral QAM     cefTRIAXone  2 g Intravenous Q24H     cholecalciferol  1,000 Units Oral Daily     enoxaparin  40 mg Subcutaneous Q24H     FLUoxetine (PROzac) capsule 40 mg  40 mg Oral QAM     levothyroxine (SYNTHROID/LEVOTHROID) tablet 88 mcg  88 mcg Oral Daily     metroNIDAZOLE  500 mg Intravenous Q8H     traZODone (DESYREL) tablet 150 mg  150 mg Oral At Bedtime     vitamin E  400 Units Oral Daily        Data     Recent Labs  Lab 05/13/18  2330 05/12/18  0832 05/11/18  1220   WBC  --  9.2 12.1*   HGB   "--  11.0* 12.5   MCV  --  93 92   PLT  --  253 292   NA  --  138 138   POTASSIUM  --  3.7 3.6   CHLORIDE  --  108 104   CO2  --  21 27   BUN  --  12 12   CR 0.67 0.75 0.81   ANIONGAP  --  9 7   JAYE  --  8.1* 9.6   GLC  --  145* 100*       No results found for this or any previous visit (from the past 24 hour(s)).[MA1.1]     Revision History        User Key Date/Time User Provider Type Action    > MA1.2 5/14/2018  8:53 AM Esau Greene MD Physician Addend     MA1.3 5/14/2018  8:37 AM Esau Greene MD Physician Sign     MA1.1 5/14/2018  8:17 AM Esau Greene MD Physician             Progress Notes by Isaac Muñoz MD at 5/14/2018  7:24 AM     Author:  Isaac Muñoz MD Service:  Orthopedics Author Type:  Physician    Filed:  5/14/2018  7:26 AM Date of Service:  5/14/2018  7:24 AM Creation Time:  5/14/2018  7:24 AM    Status:  Signed :  Isaac Muñoz MD (Physician)         Mariposa Mendez  5/14/2018    S: Clinically better each day.  Pain managed with tylenol.    O:  Blood pressure 121/69, pulse 83, temperature 97.6  F (36.4  C), temperature source Oral, resp. rate 16, height 1.626 m (5' 4\"), weight 49.9 kg (110 lb), SpO2 94 %.    Decreased swelling and tenderness R hand.  Fingers stiff.  No significant erythema.  NVI.    A: Dog bite, R hand.     PLAN: OK to discharge from my standpoint on oral antibiotics per hospitalist choice for 7-10 days.  F/u with me in my office later this week.  Will start outpatient hand therapy then.[MU1.1]           Revision History        User Key Date/Time User Provider Type Action    > MU1.1 5/14/2018  7:26 AM Isaac Muñoz MD Physician Sign            Progress Notes by Isaac Muñoz MD at 5/13/2018 12:18 PM     Author:  Isaac Muñoz MD Service:  Orthopedics Author Type:  Physician    Filed:  5/13/2018 12:26 PM Date of Service:  5/13/2018 12:18 PM Creation Time:  5/13/2018 12:18 PM    Status:  Signed :  Isaac Muñoz MD (Physician)         Mariposa GUILLORY" "Shanial  5/13/2018      S: Mild pain managed with tylenol.    O: Blood pressure 127/81, pulse 83, temperature 98  F (36.7  C), temperature source Oral, resp. rate 18, height 1.626 m (5' 4\"), weight 49.9 kg (110 lb), SpO2 96 %.    Decreased swelling R dorsal hand.  Less erythema.   Mild-moderate serosanguinous drainage from dorsal bite wounds.  No swelling or tenderness over distal volar ulna.  NVI.    MRI for the most part demonstrates diffuse dorsal hand edema.  There is a small fluid accumulation along the volar distal ulna with a small enclosed loose body adjacent to the radiocarpal and/or PHILL joints.  Fracture noted of proximal ring metacarpal.  No tendon or nerve injury.    A: Dog bite R hand.     PLAN: I don't think that there is anything based upon the MRI that requires surgical drainage.  The fluid accumulation near the distal volar ulna is no were near the area of trauma and is likely arthritic in nature.    I'll get OT to help the patient on digital and wrist ROM.  Continue IV antibiotics.  Timing of conversion to orals and eventual discharge up to the hospitalist service.[MU1.1]           Revision History        User Key Date/Time User Provider Type Action    > MU1.1 5/13/2018 12:26 PM Isaac Muñoz MD Physician Sign            Progress Notes by Isaac Muñoz MD at 5/12/2018 11:58 AM     Author:  Isaac Muñoz MD Service:  Orthopedics Author Type:  Physician    Filed:  5/12/2018 12:01 PM Date of Service:  5/12/2018 11:58 AM Creation Time:  5/12/2018 11:58 AM    Status:  Signed :  Isaac Muñoz MD (Physician)         Mariposa Mendez  5/12/2018    S: Less pain today.    O:  Blood pressure 117/57, pulse 77, temperature 98.2  F (36.8  C), temperature source Oral, resp. rate 18, height 1.626 m (5' 4\"), weight 49.9 kg (110 lb), SpO2 97 %.    Much less erythema.  Still moderate swelling of R hand.  Multiple puncture wounds.  Serosanguinous (not purulent) drainage.  NVI.    Labs:   Hemoglobin   Date Value " Ref Range Status   05/12/2018 11.0 (L) 11.7 - 15.7 g/dL Final     No results found for: INR  Lab Results   Component Value Date    WBC 9.2 05/12/2018     A: Dog bite R hand.     PLAN: Continue IV antibiotics.  I will order a hand MRI to r/o abscess.[MU1.1]           Revision History        User Key Date/Time User Provider Type Action    > MU1.1 5/12/2018 12:01 PM Isaac Muñoz MD Physician Sign            Progress Notes by Khadra Lafleur PT at 5/12/2018 10:17 AM     Author:  Khadra Lafleur PT Service:  (none) Author Type:  Physical Therapist    Filed:  5/12/2018 10:18 AM Date of Service:  5/12/2018 10:17 AM Creation Time:  5/12/2018 10:17 AM    Status:  Signed :  Khadra Lafleur PT (Physical Therapist)            05/12/18 0839   Quick Adds   Type of Visit Initial PT Evaluation   Living Environment   Lives With alone   Living Arrangements house   Number of Stairs to Enter Home 3  (Left sided railing going up the stairs)   Number of Stairs Within Home 12  (B railings)   Transportation Available family or friend will provide  (Nephew and Daughter provide transportation)   Living Environment Comment Pt reports all needs are met on the main level except laundry in the basement.    Self-Care   Usual Activity Tolerance good   Current Activity Tolerance moderate   Regular Exercise yes   Activity/Exercise Type strength training  (Home PT 1x/week)   Equipment Currently Used at Home walker, rolling   Functional Level Prior   Ambulation 1-->assistive equipment   Transferring 1-->assistive equipment   Fall history within last six months no   Prior Functional Level Comment Pt reports completing functional mobiltiy independently with use of FWW.    General Information   Onset of Illness/Injury or Date of Surgery - Date 05/11/18   Referring Physician Soniya Patel MD   Patient/Family Goals Statement Return home.    Pertinent History of Current Problem (include personal factors and/or comorbidities that impact the  "POC) 74 y/o female admitted after sustaining a R hand dog bite with cellulitis and R 4th metacarpal fracture.    Precautions/Limitations fall precautions   General Observations Pt in supine upon arrival of therapist.    General Info Comments Up ad silver.    Cognitive Status Examination   Orientation orientation to person, place and time  (\"Hospital\")   Level of Consciousness alert   Follows Commands and Answers Questions 100% of the time   Personal Safety and Judgment intact   Pain Assessment   Patient Currently in Pain (R arm pain: 7/10)   Integumentary/Edema   Integumentary/Edema Comments R arm covered with splint.   Posture    Posture Comments Noted forward head and shoulder posture upon standing at walker.    Range of Motion (ROM)   ROM Comment Unable to assess R hand ROM d/t splint, otherwise B LES and L UE WFL.    Strength   Strength Comments Not formally assessed, pt demonstrated at least 3/5 grossly in B LEs with functional mobility.    Bed Mobility   Bed Mobility Comments Supine-sit, SBA.    Transfer Skills   Transfer Comments Sit <> stand with R platform walker and CGA.    Gait   Gait Comments Pt amb 10' with R platform walker and CGA.    Balance   Balance Comments Noted good sitting and standing balance at walker.    Sensory Examination   Sensory Perception Comments Pt denies numbness/tingling in extremities.    General Therapy Interventions   Planned Therapy Interventions bed mobility training;gait training;transfer training;strengthening   Clinical Impression   Criteria for Skilled Therapeutic Intervention yes, treatment indicated   PT Diagnosis Difficulty with gait.    Influenced by the following impairments Pain, Generalized weakness, Decreased activity tolerance   Functional limitations due to impairments Limited functional mobility requiring AD and assist.    Clinical Presentation Evolving/Changing   Clinical Presentation Rationale Based on PMH, current presentation, and social support.    Clinical " "Decision Making (Complexity) Low complexity   Therapy Frequency` daily   Predicted Duration of Therapy Intervention (days/wks) 3 days   Anticipated Equipment Needs at Discharge (Platform for walker)   Anticipated Discharge Disposition Home with Assist;Home with Home Therapy   Risk & Benefits of therapy have been explained Yes   Patient, Family & other staff in agreement with plan of care Yes   [facility] AM-PAC  \"6 Clicks\" V.2 Basic Mobility Inpatient Short Form   1. Turning from your back to your side while in a flat bed without using bedrails? 4 - None   2. Moving from lying on your back to sitting on the side of a flat bed without using bedrails? 3 - A Little   3. Moving to and from a bed to a chair (including a wheelchair)? 3 - A Little   4. Standing up from a chair using your arms (e.g., wheelchair, or bedside chair)? 3 - A Little   5. To walk in hospital room? 3 - A Little   6. Climbing 3-5 steps with a railing? 2 - A Lot   Basic Mobility Raw Score (Score out of 24.Lower scores equate to lower levels of function) 18   Total Evaluation Time   Total Evaluation Time (Minutes) 10[JH1.1]        Revision History        User Key Date/Time User Provider Type Action    > JH1.1 5/12/2018 10:18 AM Khadra Lafleur PT Physical Therapist Sign            ED Provider Notes by Farnaz Allison MD at 5/11/2018 11:56 AM     Author:  Farnaz Allison MD Service:  Emergency Medicine Author Type:  Physician    Filed:  5/11/2018  2:34 PM Date of Service:  5/11/2018 11:56 AM Creation Time:  5/11/2018 12:05 PM    Status:  Signed :  Farnaz Allison MD (Physician)           History     Chief Complaint:[SE1.1]  Dog Bite[SE1.2]       HPI   Mariposa Mendez is a 73 year old right handed female who presents with a dog bite. The patient was seen at a [facility] urgent care on 5/10 after being bitten by her neighbor's dog on the right hand.[SE1.1] The dog is up to date on its shots.[SE1.3] When she was seen " there were several small cuts noted[SE1.1] on the back and palmar aspect of her hand.[MB1.1] The urgent care provider did repair the small wounds with dermabond and there was one wound on the right wrist that required a full repair. the patient's tetanus was updated.  The patient was provided a prescription for[SE1.1] Augmentin[SE1.4] but was told to only fill this if she had increased pain. Today, the patient went back to the urgent care[SE1.1] due to increased swelling, redness, and pain.[MB1.1]  had an x-ray completed showing a minimally displaced fracture of the 4th metacarpal. She was ultimately referred here to the ED for further evaluation. The patient reports having increased swelling and pain[SE1.3] and tingling[MB1.1] to her right hand. She denies any fevers, vomiting, cough or diarrhea.[SE1.3]       Allergies:  No known drug allergies.     Medications:    Aspirin   Celexa  Levoxyl   Trazodone     Past Medical History:    Depressive Disorder  Osteoarthrosis   Osteopenia of hip   Hyperlipidemia  Hypothyroidism    Past Surgical History:    SUSANNAH and Unilateral oophorectomy   Right foot surgery     Family History:    Breast Cancer-Mother  Arthritis-Mother  Lipids-Sister  Hypertension-Mother  Thyroid Disease-Sister  Psychotic Disorder-Brother, Sister, Mother    Social History:  Marital Status:   Presents to the ED with a friend  Tobacco Use: Current daily smoker, 0.50 PPD.   Alcohol Use: Yes     Review of Systems   Constitutional: Negative for[SE1.1] fever[SE1.3].   Respiratory: Negative for[SE1.1] cough[SE1.3].    Gastrointestinal: Negative for[SE1.1] diarrhea[SE1.3] and[SE1.1] vomiting[SE1.3].   Musculoskeletal:[SE1.1]        Positive for right hand pain and swelling.[SE1.3]    Skin: Positive for[SE1.1] color change[SE1.3] and[SE1.1] wound[SE1.3].[SE1.1]   All other systems reviewed and are negative[SE1.3].    Physical Exam   First Vitals:[SE1.1]  BP: 150/69  Heart Rate: 72  Temp: 97.2  F (36.2  " C)  Resp: 16  Height: 162.6 cm (5' 4\")  Weight: 49.9 kg (110 lb)  SpO2: 99 %[SE1.5]      Physical Exam[SE1.1]    Physical Exam   Constitutional:  Patient is oriented to person, place, and time. They appear well-developed and well-nourished. Mild distress secondary to hand pain   HENT:   Mouth/Throat:   Oropharynx is clear and moist.   Eyes:    Conjunctivae normal and EOM are normal. Pupils are equal, round, and reactive to light.   Neck:    Normal range of motion.   Cardiovascular: Normal rate, regular rhythm and normal heart sounds.  Exam reveals no gallop and no friction rub.  No murmur heard.  Pulmonary/Chest:  Effort normal and breath sounds normal. Patient has no wheezes. Patient has no rales.   Abdominal:   Soft. Bowel sounds are normal. Patient exhibits no mass. There is no tenderness. There is no rebound and no guarding.   Musculoskeletal:  Normal range of motion.   Neurological:   Patient is alert and oriented to person, place, and time. Patient has normal strength. No cranial nerve deficit or sensory deficit. GCS 15  Skin:   Right upper extremity from the mid forearm down to the PIPs are edematous, erythematous. She has ecchymosis on the distal fingers. She has pain with passive and active range of motion. She has multiple superficial dermabonded wounds on the dorsum of her hand as well as one on the palmar aspect. She also has a dermabonded wound on the volar aspect of her wrist and a 2 cm laceration that is stitched with what appears to be 4.0 nylon on the dorsum of her wrist. No fluctuance, no purulent drainage, or significant edema.    Psychiatric:   Patient has a normal mood and affect. Patient's behavior is normal. Judgment and thought content normal.[SE1.4]                Emergency Department Course   Laboratory:[SE1.1]  CBC:  WBC[SE1.6] 12.1 (H)[SE1.7], HGB[SE1.6] 12.5[SE1.7], PLT[SE1.6] 292[SE1.7]   BMP:[SE1.6] Glucose 100 (H)[SE1.7], otherwise WNL (Creatinine[SE1.6] 0.81[SE1.7])   CRP:[SE1.6] " 86.8 (H)[SE1.7]  Lactic Acid:[SE1.6] 1.9[SE1.7]  SED Rate:[SE1.6] 15  Blood Culture: pending x 2[SE1.7]     Procedures:[SE1.1]   Splint Placement    PLACEMENT: Custom Orthoglass volar splint was applied to the right upper extremity and after placement I checked and adjusted the fit to ensure proper positioning. The patient was more comfortable with the splint in place. Sensation and circulation are intact after splint placement.[SE1.4]     Interventions:[SE1.1]   (1252) Ro[SE1.7]cephin, 1 g, IV   (1232) Morphine, 2 mg, IV injection   (1252) Morphine, 2 mg, IV injection   Metronidazole, 500 mg, Pending[SE1.4]     Emergency Department Course:[SE1.1]  Nursing notes and vitals reviewed.  (1209) I performed an exam of the patient as documented above.      A peripheral IV was established. Blood was drawn from the patient. This was sent for laboratory testing, findings above.      (1222) I consulted with Dr. Muñoz of hand surgery regarding the patient. He will come to the ED to evaluate the patient.[SE1.8]     Findings and plan explained to the patient who consents to admission.   (1304) I discussed the patient with Dr. Patel of the hospitalist service, who will admit the patient to a medical bed for further monitoring, evaluation, and treatment.[SE1.4]     Impression & Plan    Medical Decision Making:[SE1.1]  Mariposa Mendez is a 73 year old female[SE1.7] presenting after being reassessed at [facility] urgent care for dog bite she sustained yesterday.  Yesterday they both Dermabond it and stitched her wounds[MB1.2],[SE1.4] updated her tetanus[MB1.2] but[SE1.4] did not start her on antibiotics.  X-ray done at the facility today did show a nondisplaced right fourth MCP fracture as well as[MB1.2] air[MB1.1] in the skin.  She is unable to appreciably move her fingers secondary to pain and she has significant redness and edema.  Vital signs here show her to be afebrile[MB1.2]. S[SE1.4]he is not hypotensive or tachycardic.   She has no purulent drainage[MB1.2]. S[SE1.4]he does have extensive swelling[MB1.2] and[SE1.4] pain with both passive and active range of motion of her fingers.    Diagnostic testing does show an elevated white blood cell count as well as CRP[MB1.2]. H[SE1.4]er sed rate is normal[MB1.2]. L[SE1.4]actic acid[MB1.2] is[SE1.4] not elevated[MB1.2],[SE1.4] blood cultures are pending    Patient was placed in a volar hand splint for immobility and antibiotics[MB1.2], to cover for dog bites,[MB1.1] were started Dr. Muñoz of hand surgery came down to assess the patient[MB1.2]. A[SE1.4]t this point there is no emergent need for surgical washout[MB1.2]. W[SE1.4]e will keep the stitches in place[MB1.2]. W[SE1.4]e will immobilize and admit for IV antibiotics[MB1.2]. D[SE1.4]id not feel there is any evidence of tenosynovitis or abscess[MB1.2];[MB1.1] admit to Dr. Valdovinos[MB1.2]fle[SE1.4]    Diagnosis:[SE1.1]    ICD-10-CM    1. Cellulitis L03.90[SE1.5]        Disposition:[SE1.1]  Admitted to the hospitalist[SE1.7]       I, Myriam Ayoub, am serving as a scribe on 5/11/2018 at 12:09 PM to personally document services performed by Dr. Allison based on my observations and the provider's statements to me.      5/11/2018    EMERGENCY DEPARTMENT[SE1.1]       Farnaz Allison MD  05/11/18 9064  [MB1.3]     Revision History        User Key Date/Time User Provider Type Action    > MB1.3 5/11/2018  2:34 PM Farnaz Allison MD Physician Sign     MB1.1 5/11/2018  2:16 PM Farnaz Allison MD Physician      SE1.5 5/11/2018  1:29 PM Myriam Ayoubibe Share     [N/A] 5/11/2018  1:07 PM Farnaz Allison MD Physician Share     SE1.4 5/11/2018  1:07 PM EsMyriam jenningsibe      SE1.7 5/11/2018  1:06 PM Myriam Ayoubibariadne Share     MB1.2 5/11/2018 12:59 PM Farnaz Allisno MD Physician Share     [N/A] 5/11/2018 12:32 PM EsMyriam jenningsibe Share     SE1.8 5/11/2018 12:31 PM Myriam Ayoub  "Share     SE1.6 5/11/2018 12:28 PM Myriam Ayoub      [N/A] 5/11/2018 12:21 PM Myriam Ayoubibariadne Share     SE1.3 5/11/2018 12:18 PM EsMyriam jenningsibariadne Share     SE1.2 5/11/2018 12:05 PM Myriam Ayoubibe      SE1.1 5/11/2018 12:04 PM Myriam Ayoube             Progress Notes by Tg Brock RN at 5/11/2018  1:44 PM     Author:  Tg Brock RN Service:  (none) Author Type:  Registered Nurse    Filed:  5/11/2018  1:44 PM Date of Service:  5/11/2018  1:44 PM Creation Time:  5/11/2018  1:44 PM    Status:  Signed :  Tg Brock RN (Registered Nurse)         RECEIVING UNIT ED HANDOFF REVIEW    ED Nurse Handoff Report was reviewed by: Tg Brock on May 11, 2018 at 1:44 PM[GS1.1]            Revision History        User Key Date/Time User Provider Type Action    > GS1.1 5/11/2018  1:44 PM Tg Brock RN Registered Nurse Sign            ED Notes by Julianne Ayala RN at 5/11/2018  1:21 PM     Author:  Julianne Ayala RN Service:  (none) Author Type:  Registered Nurse    Filed:  5/11/2018  1:25 PM Date of Service:  5/11/2018  1:21 PM Creation Time:  5/11/2018  1:21 PM    Status:  Signed :  Julianne Ayala RN (Registered Nurse)         [facility]  ED Nurse Handoff Report    ED Chief complaint: Dog Bite (Bit by a dog maybe 3 days ago. has been on antibiotics but has increased swelling )      ED Diagnosis:   Final diagnoses:   None       Code Status: Full Code    Allergies: No Known Allergies    Activity level - Baseline/Home:  Independent    Activity Level - Current:   Independent     Needed?: No    Isolation: No  Infection: Not Applicable    Bariatric?: No    Vital Signs:   Vitals:    05/11/18 1200 05/11/18 1245 05/11/18 1251   BP: 150/69     Resp: 16     Temp: 97.2  F (36.2  C)     TempSrc: Oral     SpO2: 99% 94% 100%   Weight: 49.9 kg (110 lb)     Height: 1.626 m (5' 4\")         Cardiac Rhythm: ,    "     Pain level: 0-10 Pain Scale: 5    Is this patient confused?: No     Patient Report: Initial Complaint: dog bite  Focused Assessment: Reports R hand pain after dog bite last night. Was seen at  last night, sutured and XR'd which showed 4th metacarpal fx.  Increased swelling, pain and limited movement of fingers this AM.  Started on rocephin and flagyl.  BC x2 pending in lab.  Ortho consulted, no plan for OR will continue AB course.  Placed in a splint in ED.  Morphine for pain.   Tests Performed: blood work  Abnormal Results: WBC 12.1, CRP 86.8  Treatments provided: pain control, splint, AB    Family Comments: friend @ bedside    OBS brochure/video discussed/provided to patient: N/A    ED Medications:   Medications   cefTRIAXone (ROCEPHIN) 1 g vial to attach to  mL bag for ADULTS or NS 50 mL bag for PEDS (1 g Intravenous New Bag 5/11/18 1252)   metroNIDAZOLE (FLAGYL) infusion 500 mg (not administered)   morphine (PF) injection 2 mg (2 mg Intravenous Given 5/11/18 1252)   morphine (PF) 4 MG/ML injection (2 mg  Given 5/11/18 1232)       Drips infusing?:  Yes    For the majority of the shift this patient was Green.   Interventions performed were comfort measures.    Severe Sepsis OR Septic Shock Diagnosis Present: No      ED NURSE PHONE NUMBER: *[id number][SS1.1]            Revision History        User Key Date/Time User Provider Type Action    > SS1.1 5/11/2018  1:25 PM Julianne Ayala RN Registered Nurse Sign            ED Notes by Saumya Zhou RN at 5/11/2018 12:00 PM     Author:  Saumya Zhou RN Service:  (none) Author Type:  Registered Nurse    Filed:  5/11/2018 12:00 PM Date of Service:  5/11/2018 12:00 PM Creation Time:  5/11/2018 12:00 PM    Status:  Signed :  Saumya Zhou RN (Registered Nurse)         Bed: ED03  Expected date:   Expected time:   Means of arrival:   Comments:  Triage     Revision History        User Key Date/Time User Provider Type Action    >  KC1.1 5/11/2018 12:00 PM Saumya Zhou, RN Registered Nurse Sign                  Procedure Notes     No notes of this type exist for this encounter.         Progress Notes - Therapies (Notes from 05/11/18 through 05/14/18)      Progress Notes by Sid Echavarria OT at 5/14/2018 10:42 AM     Author:  Sid Echavarria OT Service:  Acute IP Rehab Author Type:  Occupational Therapist    Filed:  5/14/2018 10:42 AM Date of Service:  5/14/2018 10:42 AM Creation Time:  5/14/2018 10:42 AM    Status:  Signed :  Sid Echavarria OT (Occupational Therapist)          05/14/18 0818   Quick Adds   Type of Visit Initial Occupational Therapy Evaluation   Living Environment   Lives With alone   Living Arrangements house   Home Accessibility stairs to enter home;stairs within home   Number of Stairs to Enter Home 3   Number of Stairs Within Home 12   Transportation Available family or friend will provide   Self-Care   Dominant Hand right   Usual Activity Tolerance good   Current Activity Tolerance moderate   Regular Exercise yes   Activity/Exercise Type strength training   Equipment Currently Used at Home walker, rolling   Activity/Exercise/Self-Care Comment Has home PT 1x/wk   Functional Level Prior   Ambulation 1-->assistive equipment   Transferring 1-->assistive equipment   Toileting 1-->assistive equipment   Bathing 1-->assistive equipment   Dressing 0-->independent   Eating 0-->independent   Communication 0-->understands/communicates without difficulty   Swallowing 0-->swallows foods/liquids without difficulty   Cognition 0 - no cognition issues reported   Fall history within last six months no   Number of times patient has fallen within last six months 1   General Information   Onset of Illness/Injury or Date of Surgery - Date 05/11/18   Referring Physician Dr. Isaac Muñoz   Patient/Family Goals Statement return home   Additional Occupational Profile Info/Pertinent History of Current Problem 74 y/o female  admitted after sustaining a R hand dog bite with cellulitis and R 4th metacarpal fracture.     Cognitive Status Examination   Cognitive Comment grossly intact   Pain Assessment   Patient Currently in Pain (R wrist/hand with movement only)   Range of Motion (ROM)   ROM Comment LUE, R gunner and elb WNL. Difficulty tolerating any PROM to 2nd thru 4th digits. R thumb AROM WFL.   Strength   Strength Comments LUE WNL, R gunner 4/5, elb 5/5 - hand not tested   Hand Strength   Hand Strength Comments L hand strength WNL   Coordination   Coordination Comments L WNL - unable to use R hand functionally   Mobility   Bed Mobility Comments See PT eval for mobility assessment   Upper Body Dressing   Level of [place]: Dress Upper Body moderate assist (50% patients effort)   Lower Body Dressing   Level of [place]: Dress Lower Body minimum assist (75% patients effort)   Toileting   Level of [place]: Toilet stand-by assist   Grooming   Level of [place]: Grooming stand-by assist   Physical Assist/Nonphysical Assist: Grooming set-up required   Eating/Self Feeding   Level of [place]: Eating independent   Physical Assist/Nonphysical Assist: Eating (L hand)   Instrumental Activities of Daily Living (IADL)   Previous Responsibilities meal prep;housekeeping;laundry;shopping;yardwork;medication management;finances;driving   Activities of Daily Living Analysis   Impairments Contributing to Impaired Activities of Daily Living pain;ROM decreased;strength decreased;coordination impaired  (dominant R hand)   General Therapy Interventions   Planned Therapy Interventions ADL retraining;IADL retraining;home program guidelines   Clinical Impression   Criteria for Skilled Therapeutic Interventions Met yes, treatment indicated   OT Diagnosis decreased ADL performance   Influenced by the following impairments dominant R hand laceration/fracture, pain, edema   Assessment of Occupational Performance 3-5 Performance Deficits   Identified  "Performance Deficits dressing, grooming, household mgmt, community mobility   Clinical Decision Making (Complexity) Low complexity   Predicted Duration of Therapy Intervention (days/wks) eval and one treatment only   Anticipated Discharge Disposition Home with Assist   Risks and Benefits of Treatment have been explained. Yes   Patient, Family & other staff in agreement with plan of care Yes   [facility] TM \"6 Clicks\"   2016, Trustees of [facility], under license to [employer].  All rights reserved.   6 Clicks Short Forms Daily Activity Inpatient Short Form   [facility]  \"6 Clicks\" Daily Activity Inpatient Short Form   1. Putting on and taking off regular lower body clothing? 4 - None   2. Bathing (including washing, rinsing, drying)? 3 - A Little   3. Toileting, which includes using toilet, bedpan or urinal? 4 - None   4. Putting on and taking off regular upper body clothing? 3 - A Little   5. Taking care of personal grooming such as brushing teeth? 4 - None   6. Eating meals? 4 - None   Daily Activity Raw Score (Score out of 24.Lower scores equate to lower levels of function) 22   Total Evaluation Time   Total Evaluation Time (Minutes) 15[MR1.1]        Revision History        User Key Date/Time User Provider Type Action    > MR1.1 5/14/2018 10:42 AM Sid Echavarria OT Occupational Therapist Sign            Progress Notes by Khadra Lafleur PT at 5/12/2018 10:17 AM     Author:  Khadra Lafleur PT Service:  (none) Author Type:  Physical Therapist    Filed:  5/12/2018 10:18 AM Date of Service:  5/12/2018 10:17 AM Creation Time:  5/12/2018 10:17 AM    Status:  Signed :  Khadra Lafleur PT (Physical Therapist)            05/12/18 0839   Quick Adds   Type of Visit Initial PT Evaluation   Living Environment   Lives With alone   Living Arrangements house   Number of Stairs to Enter Home 3  (Left sided railing going up the stairs)   Number of Stairs Within Home 12  (B " "railings)   Transportation Available family or friend will provide  (Nephew and Daughter provide transportation)   Living Environment Comment Pt reports all needs are met on the main level except laundry in the basement.    Self-Care   Usual Activity Tolerance good   Current Activity Tolerance moderate   Regular Exercise yes   Activity/Exercise Type strength training  (Home PT 1x/week)   Equipment Currently Used at Home walker, rolling   Functional Level Prior   Ambulation 1-->assistive equipment   Transferring 1-->assistive equipment   Fall history within last six months no   Prior Functional Level Comment Pt reports completing functional mobiltiy independently with use of FWW.    General Information   Onset of Illness/Injury or Date of Surgery - Date 05/11/18   Referring Physician Soniya Patel MD   Patient/Family Goals Statement Return home.    Pertinent History of Current Problem (include personal factors and/or comorbidities that impact the POC) 72 y/o female admitted after sustaining a R hand dog bite with cellulitis and R 4th metacarpal fracture.    Precautions/Limitations fall precautions   General Observations Pt in supine upon arrival of therapist.    General Info Comments Up ad silver.    Cognitive Status Examination   Orientation orientation to person, place and time  (\"Hospital\")   Level of Consciousness alert   Follows Commands and Answers Questions 100% of the time   Personal Safety and Judgment intact   Pain Assessment   Patient Currently in Pain (R arm pain: 7/10)   Integumentary/Edema   Integumentary/Edema Comments R arm covered with splint.   Posture    Posture Comments Noted forward head and shoulder posture upon standing at walker.    Range of Motion (ROM)   ROM Comment Unable to assess R hand ROM d/t splint, otherwise B LES and L UE WFL.    Strength   Strength Comments Not formally assessed, pt demonstrated at least 3/5 grossly in B LEs with functional mobility.    Bed Mobility   Bed Mobility " "Comments Supine-sit, SBA.    Transfer Skills   Transfer Comments Sit <> stand with R platform walker and CGA.    Gait   Gait Comments Pt amb 10' with R platform walker and CGA.    Balance   Balance Comments Noted good sitting and standing balance at walker.    Sensory Examination   Sensory Perception Comments Pt denies numbness/tingling in extremities.    General Therapy Interventions   Planned Therapy Interventions bed mobility training;gait training;transfer training;strengthening   Clinical Impression   Criteria for Skilled Therapeutic Intervention yes, treatment indicated   PT Diagnosis Difficulty with gait.    Influenced by the following impairments Pain, Generalized weakness, Decreased activity tolerance   Functional limitations due to impairments Limited functional mobility requiring AD and assist.    Clinical Presentation Evolving/Changing   Clinical Presentation Rationale Based on PMH, current presentation, and social support.    Clinical Decision Making (Complexity) Low complexity   Therapy Frequency` daily   Predicted Duration of Therapy Intervention (days/wks) 3 days   Anticipated Equipment Needs at Discharge (Platform for walker)   Anticipated Discharge Disposition Home with Assist;Home with Home Therapy   Risk & Benefits of therapy have been explained Yes   Patient, Family & other staff in agreement with plan of care Yes   [facility] AM-PAC  \"6 Clicks\" V.2 Basic Mobility Inpatient Short Form   1. Turning from your back to your side while in a flat bed without using bedrails? 4 - None   2. Moving from lying on your back to sitting on the side of a flat bed without using bedrails? 3 - A Little   3. Moving to and from a bed to a chair (including a wheelchair)? 3 - A Little   4. Standing up from a chair using your arms (e.g., wheelchair, or bedside chair)? 3 - A Little   5. To walk in hospital room? 3 - A Little   6. Climbing 3-5 steps with a railing? 2 - A Lot   Basic Mobility Raw Score (Score out " of 24.Lower scores equate to lower levels of function) 18   Total Evaluation Time   Total Evaluation Time (Minutes) 10[JH1.1]        Revision History        User Key Date/Time User Provider Type Action    > JH1.1 5/12/2018 10:18 AM Khadra Lafleur, PT Physical Therapist Sign

## 2018-05-11 NOTE — CONSULTS
HAND SURGERY CONSULT:    74 y/o RHD retired woman, bitten on her right hand by a neighbor's dog last evening.  Has noted increased pain, swelling and redness in her hand.  Is admitted for observation and a course of IV antibiotics.    PE:  Multiple small puncture wounds R dorsal and volar hand.  Erythema dorsal hand to distal dorsal forearm.  Moderate dorsal swelling, more interstitial than fluctuant.  NVI.    X-RAYS:  Minimally angulated ring metacarpal fx.    IMPRESSION:  (1) R hand cellulitis secondary to dog bite; (2) R ring metacarpal fx.    PLAN:  IV antibiotics.  Splint.  Observation.    I will follow with you.

## 2018-05-11 NOTE — PHARMACY-ADMISSION MEDICATION HISTORY
Admission medication history interview status for the 5/11/2018  admission is complete. See EPIC admission navigator for prior to admission medications     Medication history source reliability:Good    Actions taken by pharmacist (provider contacted, etc):Verified all medications with patient's retail pharmacy - [facility]     Additional medication history information not noted on PTA med list :None    Medication reconciliation/reorder completed by provider prior to medication history? No    Time spent in this activity: 20 minutes    Prior to Admission medications    Medication Sig Last Dose Taking? Auth Provider   ASPIRIN 81 MG OR TABS 1 tab po QD (Once per day) 5/11/2018 at am Yes Brooke Christensen, Aramis Wagner PA-C   BUPROPION HCL PO Take 150 mg by mouth every morning (Takes 2 x 75mg immediate release tablets for a total of 150mg in the morning- this dose was verified with patient's retail pharmacy - [facility]) 5/11/2018 at am Yes Unknown, Entered By History   FLUOXETINE HCL PO Take 40 mg by mouth every morning (Takes 2 x 20mg for a total of 40mg daily) 5/11/2018 at am Yes Unknown, Entered By History   LEVOTHYROXINE SODIUM PO Take 88 mcg by mouth daily 5/11/2018 at am Yes Unknown, Entered By History   Multiple Vitamins-Minerals (EMERGEN-C VITAMIN C) PACK Take 1 packet by mouth daily 5/11/2018 at am Yes Unknown, Entered By History   TRAZODONE HCL PO Take 150 mg by mouth At Bedtime (Takes 1.5 x 100mg tablet for a total of 150mg at bedtime) 5/10/2018 at hs Yes Unknown, Entered By History   VITAMIN D, CHOLECALCIFEROL, PO Take 1 tablet by mouth daily (OTC: Pt unsure of strength) 5/11/2018 at am Yes Unknown, Entered By History   VITAMIN E NATURAL PO Take 1 tablet by mouth daily (OTC: Pt unsure of strength) 5/11/2018 at am Yes Unknown, Entered By History

## 2018-05-11 NOTE — ED PROVIDER NOTES
History     Chief Complaint:  Dog Bite       HPI   Mariposa Mendez is a 73 year old right handed female who presents with a dog bite. The patient was seen at a [facility] urgent care on 5/10 after being bitten by her neighbor's dog on the right hand. The dog is up to date on its shots. When she was seen there were several small cuts noted on the back and palmar aspect of her hand. The urgent care provider did repair the small wounds with dermabond and there was one wound on the right wrist that required a full repair. the patient's tetanus was updated.  The patient was provided a prescription for Augmentin but was told to only fill this if she had increased pain. Today, the patient went back to the urgent care due to increased swelling, redness, and pain.  had an x-ray completed showing a minimally displaced fracture of the 4th metacarpal. She was ultimately referred here to the ED for further evaluation. The patient reports having increased swelling and pain and tingling to her right hand. She denies any fevers, vomiting, cough or diarrhea.       Allergies:  No known drug allergies.     Medications:    Aspirin   Celexa  Levoxyl   Trazodone     Past Medical History:    Depressive Disorder  Osteoarthrosis   Osteopenia of hip   Hyperlipidemia  Hypothyroidism    Past Surgical History:    SUSANNAH and Unilateral oophorectomy   Right foot surgery     Family History:    Breast Cancer-Mother  Arthritis-Mother  Lipids-Sister  Hypertension-Mother  Thyroid Disease-Sister  Psychotic Disorder-Brother, Sister, Mother    Social History:  Marital Status:   Presents to the ED with a friend  Tobacco Use: Current daily smoker, 0.50 PPD.   Alcohol Use: Yes     Review of Systems   Constitutional: Negative for fever.   Respiratory: Negative for cough.    Gastrointestinal: Negative for diarrhea and vomiting.   Musculoskeletal:        Positive for right hand pain and swelling.    Skin: Positive for color change and wound.   All other  "systems reviewed and are negative.    Physical Exam   First Vitals:  BP: 150/69  Heart Rate: 72  Temp: 97.2  F (36.2  C)  Resp: 16  Height: 162.6 cm (5' 4\")  Weight: 49.9 kg (110 lb)  SpO2: 99 %      Physical Exam    Physical Exam   Constitutional:  Patient is oriented to person, place, and time. They appear well-developed and well-nourished. Mild distress secondary to hand pain   HENT:   Mouth/Throat:   Oropharynx is clear and moist.   Eyes:    Conjunctivae normal and EOM are normal. Pupils are equal, round, and reactive to light.   Neck:    Normal range of motion.   Cardiovascular: Normal rate, regular rhythm and normal heart sounds.  Exam reveals no gallop and no friction rub.  No murmur heard.  Pulmonary/Chest:  Effort normal and breath sounds normal. Patient has no wheezes. Patient has no rales.   Abdominal:   Soft. Bowel sounds are normal. Patient exhibits no mass. There is no tenderness. There is no rebound and no guarding.   Musculoskeletal:  Normal range of motion.   Neurological:   Patient is alert and oriented to person, place, and time. Patient has normal strength. No cranial nerve deficit or sensory deficit. GCS 15  Skin:   Right upper extremity from the mid forearm down to the PIPs are edematous, erythematous. She has ecchymosis on the distal fingers. She has pain with passive and active range of motion. She has multiple superficial dermabonded wounds on the dorsum of her hand as well as one on the palmar aspect. She also has a dermabonded wound on the volar aspect of her wrist and a 2 cm laceration that is stitched with what appears to be 4.0 nylon on the dorsum of her wrist. No fluctuance, no purulent drainage, or significant edema.    Psychiatric:   Patient has a normal mood and affect. Patient's behavior is normal. Judgment and thought content normal.                Emergency Department Course   Laboratory:  CBC:  WBC 12.1 (H), HGB 12.5,    BMP: Glucose 100 (H), otherwise WNL (Creatinine " 0.81)   CRP: 86.8 (H)  Lactic Acid: 1.9  SED Rate: 15  Blood Culture: pending x 2     Procedures:   Splint Placement    PLACEMENT: Custom Orthoglass volar splint was applied to the right upper extremity and after placement I checked and adjusted the fit to ensure proper positioning. The patient was more comfortable with the splint in place. Sensation and circulation are intact after splint placement.     Interventions:   (1252) Rocephin, 1 g, IV   (1232) Morphine, 2 mg, IV injection   (1252) Morphine, 2 mg, IV injection   Metronidazole, 500 mg, Pending     Emergency Department Course:  Nursing notes and vitals reviewed.  (1209) I performed an exam of the patient as documented above.      A peripheral IV was established. Blood was drawn from the patient. This was sent for laboratory testing, findings above.      (1222) I consulted with Dr. Muñoz of hand surgery regarding the patient. He will come to the ED to evaluate the patient.     Findings and plan explained to the patient who consents to admission.   (1304) I discussed the patient with Dr. Patel of the hospitalist service, who will admit the patient to a medical bed for further monitoring, evaluation, and treatment.     Impression & Plan    Medical Decision Making:  Mariposa Mendez is a 73 year old female presenting after being reassessed at [facility] urgent care for dog bite she sustained yesterday.  Yesterday they both Dermabond it and stitched her wounds, updated her tetanus but did not start her on antibiotics.  X-ray done at the facility today did show a nondisplaced right fourth MCP fracture as well as air in the skin.  She is unable to appreciably move her fingers secondary to pain and she has significant redness and edema.  Vital signs here show her to be afebrile. She is not hypotensive or tachycardic.  She has no purulent drainage. She does have extensive swelling and pain with both passive and active range of motion of her fingers.    Diagnostic  testing does show an elevated white blood cell count as well as CRP. Her sed rate is normal. Lactic acid is not elevated, blood cultures are pending    Patient was placed in a volar hand splint for immobility and antibiotics, to cover for dog bites, were started Dr. Muñoz of hand surgery came down to assess the patient. At this point there is no emergent need for surgical washout. We will keep the stitches in place. We will immobilize and admit for IV antibiotics. Did not feel there is any evidence of tenosynovitis or abscess; admit to Dr. Patel    Diagnosis:    ICD-10-CM    1. Cellulitis L03.90        Disposition:  Admitted to the hospitalist       I, Myriam Ayoub, am serving as a scribe on 5/11/2018 at 12:09 PM to personally document services performed by Dr. Allison based on my observations and the provider's statements to me.      5/11/2018    EMERGENCY DEPARTMENT       Farnaz Allison MD  05/11/18 4648

## 2018-05-11 NOTE — IP AVS SNAPSHOT
"          [place] [address] MEDICAL SPECIALTY UNIT: [phone]                                              INTERAGENCY TRANSFER FORM - LAB / IMAGING / EKG / EMG RESULTS   2018                    Hospital Admission Date: 2018  DARIAN ANDUJAR   : 1945  Sex: Female        Attending Provider: Soniya Patel MD     Allergies:  No Known Allergies    Infection:  None   Service:  HOSPITALIST    Ht:  1.626 m (5' 4\")   Wt:  49.9 kg (110 lb)   Admission Wt:  49.9 kg (110 lb)    BMI:  18.88 kg/m 2   BSA:  1.5 m 2            Patient PCP Information     Provider PCP Type    Patria Shea MD General         Lab Results - 3 Days      Creatinine [[id number]]  Resulted: 18 0908, Result status: Final result    Ordering provider: Esau Greene MD  18 0000 Resulting lab: [facility]    Specimen Information    Type Source Collected On   Blood  18 0835          Components       Value Reference Range Flag Lab   Creatinine 0.66 0.52 - 1.04 mg/dL  FrStHsLb   GFR Estimate 88 >60 mL/min/1.7m2  FrStHsLb   Comment:  Non  GFR Calc   GFR Estimate If Black >90 >60 mL/min/1.7m2  FrStHsLb   Comment:   GFR Calc            CBC with platelets [[id number]]  Resulted: 18 0854, Result status: Final result    Ordering provider: Esau Greene MD  18 0000 Resulting lab: [facility]    Specimen Information    Type Source Collected On   Blood  18 0835          Components       Value Reference Range Flag Lab   WBC 7.0 4.0 - 11.0 10e9/L  FrStHsLb   RBC Count 4.08 3.8 - 5.2 10e12/L  FrStHsLb   Hemoglobin 12.4 11.7 - 15.7 g/dL  FrStHsLb   Hematocrit 37.9 35.0 - 47.0 %  FrStHsLb   MCV 93 78 - 100 fl  FrStHsLb   MCH 30.4 26.5 - 33.0 pg  FrStHsLb   MCHC 32.7 31.5 - 36.5 g/dL  FrStHsLb   RDW 13.1 10.0 - 15.0 %  FrStHsLb   Platelet Count 324 150 - 450 10e9/L  FrStHsLb            Blood culture [[id number]]  Resulted: 18 0651, Result " status: Preliminary result    Ordering provider: Farnaz Allison MD  05/11/18 1229 Resulting lab: [facility]    Specimen Information    Type Source Collected On   Blood Arm, Left 05/11/18 1254   Comment:  Left Arm          Components       Value Reference Range Flag Lab   Specimen Description Blood Left Arm      Special Requests Aerobic and anaerobic bottles received   FrStHsLb   Culture Micro No growth after 3 days   75            Blood culture [[id number]]  Resulted: 05/14/18 0651, Result status: Preliminary result    Ordering provider: Farnaz Allison MD  05/11/18 1229 Resulting lab: [facility]    Specimen Information    Type Source Collected On   Blood Arm, Left 05/11/18 1220   Comment:  Left Arm          Components       Value Reference Range Flag Lab   Specimen Description Blood Left Arm      Special Requests Aerobic and anaerobic bottles received   FrStHsLb   Culture Micro No growth after 3 days   75            Creatinine [[id number]]  Resulted: 05/14/18 0006, Result status: Final result    Ordering provider: Soniya Patel MD  05/13/18 1800 Resulting lab: [facility]    Specimen Information    Type Source Collected On   Blood  05/13/18 2330          Components       Value Reference Range Flag Lab   Creatinine 0.67 0.52 - 1.04 mg/dL  FrStHsLb   GFR Estimate 86 >60 mL/min/1.7m2  FrStHsLb   Comment:  Non  GFR Calc   GFR Estimate If Black >90 >60 mL/min/1.7m2  FrStHsLb   Comment:   GFR Calc            Basic metabolic panel [[id number]] (Abnormal)  Resulted: 05/12/18 0904, Result status: Final result    Ordering provider: Soniya Patel MD  05/12/18 0001 Resulting lab: [facility]    Specimen Information    Type Source Collected On   Blood  05/12/18 0832          Components       Value Reference Range Flag Lab   Sodium 138 133 - 144 mmol/L  FrStHsLb   Potassium 3.7 3.4 - 5.3  mmol/L  FrStHsLb   Chloride 108 94 - 109 mmol/L  FrStHsLb   Carbon Dioxide 21 20 - 32 mmol/L  FrStHsLb   Anion Gap 9 3 - 14 mmol/L  FrStHsLb   Glucose 145 70 - 99 mg/dL H FrStHsLb   Urea Nitrogen 12 7 - 30 mg/dL  FrStHsLb   Creatinine 0.75 0.52 - 1.04 mg/dL  FrStHsLb   GFR Estimate 75 >60 mL/min/1.7m2  FrStHsLb   Comment:  Non  GFR Calc   GFR Estimate If Black >90 >60 mL/min/1.7m2  FrStHsLb   Comment:  African American GFR Calc   Calcium 8.1 8.5 - 10.1 mg/dL L FrStHsLb            CBC with platelets [[id number]] (Abnormal)  Resulted: 05/12/18 0849, Result status: Final result    Ordering provider: Soniya Patel MD  05/12/18 0001 Resulting lab: [facility]    Specimen Information    Type Source Collected On   Blood  05/12/18 0832          Components       Value Reference Range Flag Lab   WBC 9.2 4.0 - 11.0 10e9/L  FrStHsLb   RBC Count 3.65 3.8 - 5.2 10e12/L L FrStHsLb   Hemoglobin 11.0 11.7 - 15.7 g/dL L FrStHsLb   Hematocrit 34.0 35.0 - 47.0 % L FrStHsLb   MCV 93 78 - 100 fl  FrStHsLb   MCH 30.1 26.5 - 33.0 pg  FrStHsLb   MCHC 32.4 31.5 - 36.5 g/dL  FrStHsLb   RDW 13.4 10.0 - 15.0 %  FrStHsLb   Platelet Count 253 150 - 450 10e9/L  FrStHsLb            CBC with platelets differential [[id number]] (Abnormal)  Resulted: 05/11/18 1253, Result status: Final result    Ordering provider: Farnaz Allison MD  05/11/18 1219 Resulting lab: [facility]    Specimen Information    Type Source Collected On   Blood  05/11/18 1220          Components       Value Reference Range Flag Lab   WBC 12.1 4.0 - 11.0 10e9/L H FrStHsLb   RBC Count 4.12 3.8 - 5.2 10e12/L  FrStHsLb   Hemoglobin 12.5 11.7 - 15.7 g/dL  FrStHsLb   Hematocrit 38.0 35.0 - 47.0 %  FrStHsLb   MCV 92 78 - 100 fl  FrStHsLb   MCH 30.3 26.5 - 33.0 pg  FrStHsLb   MCHC 32.9 31.5 - 36.5 g/dL  FrStHsLb   RDW 13.1 10.0 - 15.0 %  FrStHsLb   Platelet Count 292 150 - 450 10e9/L  FrStHsLb   Diff Method Automated Method   FrStHsLb    % Neutrophils 84.8 %  FrStHsLb   % Lymphocytes 7.9 %  FrStHsLb   % Monocytes 6.9 %  FrStHsLb   % Eosinophils 0.1 %  FrStHsLb   % Basophils 0.1 %  FrStHsLb   % Immature Granulocytes 0.2 %  FrStHsLb   Nucleated RBCs 0 0 /100  FrStHsLb   Absolute Neutrophil 10.3 1.6 - 8.3 10e9/L H FrStHsLb   Absolute Lymphocytes 1.0 0.8 - 5.3 10e9/L  FrStHsLb   Absolute Monocytes 0.8 0.0 - 1.3 10e9/L  FrStHsLb   Absolute Eosinophils 0.0 0.0 - 0.7 10e9/L  FrStHsLb   Absolute Basophils 0.0 0.0 - 0.2 10e9/L  FrStHsLb   Abs Immature Granulocytes 0.0 0 - 0.4 10e9/L  FrStHsLb   Absolute Nucleated RBC 0.0   FrStHsLb            CRP inflammation [[id number]] (Abnormal)  Resulted: 05/11/18 1252, Result status: Final result    Ordering provider: Farnaz Allison MD  05/11/18 1212 Resulting lab: [facility]    Specimen Information    Type Source Collected On   Blood  05/11/18 1220          Components       Value Reference Range Flag Lab   CRP Inflammation 86.8 0.0 - 8.0 mg/L H FrStHsLb            Basic metabolic panel [[id number]] (Abnormal)  Resulted: 05/11/18 1252, Result status: Final result    Ordering provider: Farnaz Allison MD  05/11/18 1215 Resulting lab: [facility]    Specimen Information    Type Source Collected On   Blood  05/11/18 1220          Components       Value Reference Range Flag Lab   Sodium 138 133 - 144 mmol/L  FrStHsLb   Potassium 3.6 3.4 - 5.3 mmol/L  FrStHsLb   Chloride 104 94 - 109 mmol/L  FrStHsLb   Carbon Dioxide 27 20 - 32 mmol/L  FrStHsLb   Anion Gap 7 3 - 14 mmol/L  FrStHsLb   Glucose 100 70 - 99 mg/dL H FrStHsLb   Urea Nitrogen 12 7 - 30 mg/dL  FrStHsLb   Creatinine 0.81 0.52 - 1.04 mg/dL  FrStHsLb   GFR Estimate 69 >60 mL/min/1.7m2  FrStHsLb   Comment:  Non  GFR Calc   GFR Estimate If Black 83 >60 mL/min/1.7m2  FrStHsLb   Comment:  African American GFR Calc   Calcium 9.6 8.5 - 10.1 mg/dL  FrStHsLb            Erythrocyte sedimentation rate auto  [[id number]]  Resulted: 05/11/18 1243, Result status: Final result    Ordering provider: Farnaz Allison MD  05/11/18 1219 Resulting lab: [facility]    Specimen Information    Type Source Collected On   Blood  05/11/18 1220          Components       Value Reference Range Flag Lab   Sed Rate 15 0 - 30 mm/h  FrStHsLb            Lactic acid whole blood [[id number]]  Resulted: 05/11/18 1238, Result status: Final result    Ordering provider: Farnaz Allison MD  05/11/18 1219 Resulting lab: [facility]    Specimen Information    Type Source Collected On   Blood  05/11/18 1220          Components       Value Reference Range Flag Lab   Lactic Acid 1.9 0.7 - 2.0 mmol/L  FrStHsLb            Testing Performed By     Lab - Abbreviation Name Director Address Valid Date Range    14 - FrStHsLb [facility] Unknown [address] [place] [address] 05/08/15 1057 - Present    75 - Unknown [facility] Unknown [address] [facility] [address] 01/15/15 1019 - Present            Unresulted Labs (24h ago through future)    Start       Ordered    05/14/18 0600  Platelet count  (Pharmacological Prophylaxis - enoxaparin (LOVENOX) *Use only if creatinine clearance is greater than 30 mL/min)  EVERY THREE DAYS,   Routine     Comments:  Repeat every 3 days while on VTE prophylaxis.  Notify provider and hold enoxaparin if platelet count falls by 50% of baseline. If no result is listed, this lab has not been done the past 365 days. LATEST LAB RESULT: Platelet Count (10e9/L)       Date                     Value                 05/11/2018               292              ----------    05/11/18 1528    05/14/18 0000  Creatinine  (Pharmacological Prophylaxis - enoxaparin (LOVENOX) *Use only if creatinine clearance is greater than 30 mL/min)  EVERY THREE DAYS,   Routine     Comments:  Repeat every 3 days while on VTE prophylaxis.    05/11/18 1528          Imaging Results - 3 Days      MR Hand Right w/o Contrast [[id number]]  Resulted: 05/13/18 1304, Result status: Final result    Ordering provider: Isaac Muñoz MD  05/12/18 1202 Resulted by: Akbar Mata MD    Performed: 05/12/18 1957 - 05/12/18 2031 Resulting lab: RADIOLOGY RESULTS    Narrative:       MR HAND RIGHT WITHOUT CONTRAST May 12, 2018 8:31 PM     HISTORY: Dog bite to hand. Rule out abscess, has ring metacarpal  fracture.     TECHNIQUE: Coronal T1, fat-suppressed T1, fat-suppressed T2, and  inversion recovery, sagittal T1, and transverse T1 and fat-suppressed  T2-weighted pulse sequences are submitted. No contrast-enhanced images  are submitted.    COMPARISON: No radiographs are available for comparison.    FINDINGS: There is a nondisplaced or minimally displaced transverse  oblique fracture involving the fourth metacarpal proximal metaphysis.  No extension into the carpometacarpal joint is demonstrated.  Degenerative arthrosis with mild subchondral marrow edema is noted at  the first carpometacarpal joint. Scattered cysts or erosions are noted  within the carpal bones, not well evaluated with this scan. Along the  volar aspect of the distal ulna, there is an irregular shaped low  signal intensity object measuring 1.0 x 0.5 x 1.1 cm, possibly a  distal radioulnar joint or radiocarpal joint articular body versus a  foreign body, also not well seen on the edge of the scan. Adjacent  fluid is noted which may be related to a recess of the radiocarpal or  distal radioulnar joint. Extensive soft tissue edema is noted,  greatest along the dorsum of the hand, but also present within the  volar soft tissues. Complex extensor tendon sheath fluid is suggested.  This might be better delineated with contrast enhancement if  clinically warranted. However, there is no other evidence of discrete  soft tissue fluid collection or abscess. Visualized portions of the  flexor and extensor tendons appear to be grossly  intact.      Impression:       IMPRESSION:  1. Fluid collection along the volar aspect of the ulna possibly  associated with the distal radioulnar joint or radiocarpal joint.  2. 1.0 x 0.5 x 1.1 cm low signal intensity structure within the fluid  volar to the ulna, possibly a large articular body versus a foreign  body. Radiographic correlation is advised.  3. Extensive soft tissue edema in the hand, greatest dorsally but also  present in the volar soft tissues. Extensor tendon sheath complex  fluid is suspected and might be better delineated with contrast  enhancement if clinically warranted. However, there is no other  definitive evidence of soft tissue abscess.  4. Fourth metacarpal proximal metaphyseal nondisplaced or minimally  displaced fracture.  5. Nonspecific cysts or erosions in multiple carpal bones, not well  evaluated with this scan.  6. First carpometacarpal joint degenerative arthrosis.    ADAMS KOENIG MD      Testing Performed By     Lab - Abbreviation Name Director Address Valid Date Range    104 - Rad Rslts RADIOLOGY RESULTS Unknown Unknown 02/16/05 1553 - Present            Encounter-Level Documents:     There are no encounter-level documents.      Order-Level Documents:     There are no order-level documents.

## 2018-05-11 NOTE — PROGRESS NOTES
RECEIVING UNIT ED HANDOFF REVIEW    ED Nurse Handoff Report was reviewed by: Tg Brock on May 11, 2018 at 1:44 PM

## 2018-05-11 NOTE — IP AVS SNAPSHOT
` ` Patient Information     Patient Name Sex     Mia Mendezariadne GUILLORY ([id number]) Female 1945       Room Bed    6610 6610-01      Patient Demographics     Address Phone    [address] [address] [phone] (Home)  [phone] (Mobile)      Patient Ethnicity & Race     Ethnic Group Patient Race    Other White      Emergency Contact(s)     Name Relation Home Work Mobile    Hilda Chance Daughter [phone]      brian Brother [phone]        Documents on File        Status Date Received Description       Documents for the Patient    Insurance Card  03     Privacy Notice - [place]  03     Insurance Card       Face Sheet  06     Insurance Card  06     Consent Form       Waiver - Payment       Face Sheet  10/08/08     Consent for EHR Access Received 18     Patient ID Received 18     [facility] Specified Other       Consent for Services - Hospital and Clinic Received 18     HIE Auth Received 18     Privacy Notice - [place] Received 18        Documents for the Encounter    CMS IM for Patient Signature Received 18     Photograph       Photograph       CMS IM for Patient Signature Received 18   2MM      Admission Information     Attending Provider Admitting Provider Admission Type Admission Date/Time    Soniya Patel MD Kafle, Poonam, MD Emergency 18  1200    Discharge Date Hospital Service Auth/Cert Status Service Area     Hospitalist [facility]    Unit Room/Bed Admission Status       [address] UNIT 6610/6610-01 Admission (Confirmed)       Admission     Complaint    Cellulitis      Hospital Account     Name Acct ID Class Status Primary Coverage    Mia Mendezariadne GUILLORY [id number] Inpatient Open None            Guarantor Account (for Hospital Account #[id number])     Name Relation to Pt Service Area Active? Acct Type    Mariposa Mendez  FCS Yes Personal/Family    Address Phone          [address]  [place] [address] [phone](H)              Coverage Information (for Hospital Account #[id number])     Not on file

## 2018-05-11 NOTE — IP AVS SNAPSHOT
"` `           [place] [address] [facility]: [phone]                                              INTERAGENCY TRANSFER FORM - NURSING   2018                    Hospital Admission Date: 2018  DARIAN ANDUJAR   : 1945  Sex: Female        Attending Provider: Soniya Patel MD     Allergies:  No Known Allergies    Infection:  None   Service:  HOSPITALIST    Ht:  1.626 m (5' 4\")   Wt:  49.9 kg (110 lb)   Admission Wt:  49.9 kg (110 lb)    BMI:  18.88 kg/m 2   BSA:  1.5 m 2            Patient PCP Information     Provider PCP Type    Patria Shea MD General      Current Code Status     Date Active Code Status Order ID Comments User Context       Prior      Code Status History     Date Active Date Inactive Code Status Order ID Comments User Context    2018  2:18 PM  Full Code [id number]  Esau Greene MD Outpatient    2018  8:44 AM 2018  2:18 PM Full Code [id number]  Esau Greene MD Outpatient    2018  3:28 PM 2018  8:44 AM Full Code [id number]  Soniya Patel MD Inpatient      Advance Directives        Scanned docmt in ACP Activity?           No scanned doc        Hospital Problems as of 2018              Priority Class Noted POA    Cellulitis Medium  2018 Yes      Non-Hospital Problems as of 2018              Priority Class Noted    Hyperlipidemia Medium  2004    Hypothyroidism Medium  2004    Tobacco use disorder Medium  2005    Depressive disorder, not elsewhere classified Medium  2005    Insomnia Medium  2005    Generalized osteoarthrosis, unspecified site Medium  Unknown    Disorder of bone and cartilage Medium  Unknown      Immunizations     Name Date      Influenza (IIV3) PF 10/16/07     TDAP Vaccine (Adacel) 10/24/06          END      ASSESSMENT     Discharge Profile Flowsheet     EXPECTED DISCHARGE     SKIN      Expected Discharge Date  18 (vs. 5/15) 18 1123   Inspection of bony prominences  " "Full 05/14/18 1003    DISCHARGE NEEDS ASSESSMENT     Inspection under devices  Full 05/14/18 1003    Equipment Currently Used at Home  walker, rolling 05/14/18 0821   Not Inspected under devices  -- (cast to R. arm) 05/12/18 0259    Transportation Available  family or friend will provide 05/14/18 0821   Skin WDL  ex 05/14/18 1003    GASTROINTESTINAL (ADULT,PEDIATRIC,OB)     Skin Temperature  warm 05/14/18 1003    GI WDL  WDL 05/14/18 1003   Skin Moisture  dry 05/14/18 1003    Last Bowel Movement  05/14/18 05/14/18 1003   Skin Integrity  bruise(s);wound(s);scar(s);scab(s) (stitches on right hand) 05/14/18 1003    Passing flatus  yes 05/14/18 1003   SAFETY      COMMUNICATION ASSESSMENT     Safety WDL  WDL 05/14/18 1003    Patient's communication style  spoken language (English or Bilingual) 05/11/18 1156   All Alarms  alarm(s) activated and audible 05/14/18 1003                 Assessment WDL (Within Defined Limits) Definitions           Safety WDL     Effective: 09/28/15    Row Information: <b>WDL Definition:</b> Bed in low position, wheels locked; call light in reach; upper side rails up x 2; ID band on<br> <font color=\"gray\"><i>Item=AS safety wdl>>List=AS safety wdl>>Version=F14</i></font>      Skin WDL     Effective: 09/28/15    Row Information: <b>WDL Definition:</b> Warm; dry; intact; elastic; without discoloration; pressure points without redness<br> <font color=\"gray\"><i>Item=AS skin wdl>>List=AS skin wdl>>Version=F14</i></font>      Vitals     Vital Signs Flowsheet     VITAL SIGNS     Response to Interventions  Decrease in pain 05/14/18 0913    Temp  98.2  F (36.8  C) 05/14/18 0814   ANALGESIA SIDE EFFECTS MONITORING      Temp src  Oral 05/14/18 0814   Side Effects Monitoring: Respiratory Quality  R 05/14/18 0913    Resp  16 05/14/18 0913   Side Effects Monitoring: Respiratory Depth  N 05/14/18 0913    Pulse  83 05/12/18 1438   Side Effects Monitoring: Sedation Level  1 05/14/18 0913    Heart Rate  75 " "05/14/18 0814   HEIGHT AND WEIGHT      Pulse/Heart Rate Source  Monitor 05/14/18 0814   Height  1.626 m (5' 4\") 05/11/18 1603    BP  131/85 05/14/18 0814   Height Method  Stated 05/11/18 1603    BP Location  Left arm 05/14/18 0814   Height Method  Stated 05/11/18 1202    OXYGEN THERAPY     Weight  49.9 kg (110 lb) 05/11/18 1603    SpO2  96 % 05/14/18 0814   BSA (Calculated - sq m)  1.5 05/11/18 1603    O2 Device  None (Room air) 05/14/18 0814   BMI (Calculated)  18.92 05/11/18 1603    PAIN/COMFORT     POSITIONING      Patient Currently in Pain  yes 05/14/18 0911   Body Position  weight shift assistance provided 05/13/18 2311    Preferred Pain Scale  number (Numeric Rating Pain Scale) 05/14/18 0911   Head of Bed (HOB)  HOB at 20-30 degrees 05/13/18 2035    Patient's Stated Pain Goal  No pain 05/14/18 0911   Positioning/Transfer Devices  pillows 05/13/18 2035    0-10 Pain Scale  3 05/14/18 1318   Chair  Upright in chair;Shifted weight 05/14/18 0911    Pain Location  Hand 05/14/18 0911   DAILY CARE      Pain Orientation  Right 05/14/18 0911   Activity Management  ambulated in barger;ambulated in room;ambulated to bathroom 05/14/18 0911    Pain Descriptors  Aching;Constant;Discomfort 05/14/18 0911   Activity Assistance Provided  assistance, stand-by 05/14/18 0911    Pain Management Interventions  cold applied 05/13/18 1803   Assistive Device Utilized  gait belt;walker 05/14/18 0911    Pain Intervention(s)  Medication (See eMAR) 05/14/18 0911   Additional Documentation  Activity Device Assistance (Row) 05/11/18 2120            Patient Lines/Drains/Airways Status    Active LINES/DRAINS/AIRWAYS     Name: Placement date: Placement time: Site: Days: Last dressing change:    Peripheral IV 05/11/18 Left Upper arm 05/11/18   1220   Upper arm   3     Wound 05/11/18 Right Hand Skin tear;Laceration 05/11/18   1210   Hand   3             Patient Lines/Drains/Airways Status    Active PICC/CVC     None            Intake/Output Detail " Report     Date Intake     Output    Shift P.O. I.V. IV Piggyback Total Total       Noc 05/12/18 2300 - 05/13/18 0659 50 671 -- 721 -- 721    Day 05/13/18 0700 - 05/13/18 1459 570 -- -- 570 -- 570    Nga 05/13/18 1500 - 05/13/18 2259 -- 1220 -- 1220 -- 1220    Noc 05/13/18 2300 - 05/14/18 0659 -- -- -- -- -- 0    Day 05/14/18 0700 - 05/14/18 1459 -- -- -- -- -- 0      Last Void/BM       Most Recent Value    Urine Occurrence 1 at 05/14/2018 0900    Stool Occurrence 1 at 05/14/2018 0900      Case Management/Discharge Planning     Case Management/Discharge Planning Flowsheet     LIVING ENVIRONMENT     Equipment Currently Used at Home  walker, rolling 05/14/18 0821    Lives With  alone 05/14/18 0821   ABUSE RISK SCREEN      Living Arrangements  [place] 05/14/18 0821   QUESTION TO PATIENT:  Has a member of your family or a partner(now or in the past) intimidated, hurt, manipulated, or controlled you in any way?  -- 05/11/18 1214    COPING/STRESS     QUESTION TO PATIENT: Do you feel safe going back to the place where you are living?  -- 05/11/18 1214    Major Change/Loss/Stressor  none 05/11/18 1553   OBSERVATION: Is there reason to believe there has been maltreatment of a vulnerable adult (ie. Physical/Sexual/Emotional abuse, self neglect, lack of adequate food, shelter, medical care, or financial exploitation)?  -- 05/11/18 1214    EXPECTED DISCHARGE     OTHER      Expected Discharge Date  05/14/18 (vs. 5/15) 05/13/18 1123   Are you depressed or being treated for depression?  Yes 05/11/18 1551    DISCHARGE PLANNING     HOMICIDE RISK      Transportation Available  family or friend will provide 05/14/18 0821   Feels Like Hurting Others  -- 05/11/18 1214    FINAL RESOURCES

## 2018-05-11 NOTE — IP AVS SNAPSHOT
MRN:[id number]                      After Visit Summary   5/11/2018    Mariposa Mendez    MRN: [id number]           Thank you!     Thank you for choosing [facility] for your care. Our goal is always to provide you with excellent care. Hearing back from our patients is one way we can continue to improve our services. Please take a few minutes to complete the written survey that you may receive in the mail after you visit with us. Thank you!        Patient Information     Date Of Birth          1945        Designated Caregiver       Most Recent Value    Caregiver    Will someone help with your care after discharge? no      About your hospital stay     You were admitted on:  May 11, 2018 You last received care in the:  [facility] [address] [facility]    You were discharged on:  May 14, 2018       Who to Call     For medical emergencies, please call 911.  For non-urgent questions about your medical care, please call your primary care provider or clinic, [phone]          Attending Provider     Provider Specialty    Farnaz Allison MD Emergency Medicine    [facility] Internal Medicine       Primary Care Provider Office Phone # Fax #    Patria Shea [phone]      After Care Instructions     Activity       Your activity upon discharge: activity as tolerated            Activity - Up with nursing assistance           Advance Diet as Tolerated       Follow this diet upon discharge: Orders Placed This Encounter      Combination Diet Regular Diet Adult      Diet            Diet       Follow this diet upon discharge: Orders Placed This Encounter      Combination Diet Regular Diet Adult            Discharge Instructions       [facility] to resume RN OT and PT            General info for SNF       Length of Stay Estimate: Short Term Care: Estimated # of Days <30  Condition at Discharge: Stable  Level of care:skilled   Rehabilitation Potential:  "Good  Admission H&P remains valid and up-to-date: Yes  Recent Chemotherapy: N/A  Use Nursing Home Standing Orders: Yes            Mantoux instructions       Give two-step Mantoux (PPD) Per Facility Policy Yes            Wound care and dressings       Instructions to care for your wound at home: keep wound clean and dry.  Daily gauze dressing changes as needed.                  Follow-up Appointments     Follow-up and recommended labs and tests        Follow up with Dr. Muñoz in 2-5 days ([phone]).            Follow-up and recommended labs and tests        Appointment with Dr Muñoz (ortho) 5/18 at 11:30 am    Add: [address]  2nd floor  [place]                  Additional Services     Occupational Therapy Adult Consult       Evaluate and treat as clinically indicated.    Reason:  Cellulitis, deconditioning            Physical Therapy Adult Consult       Evaluate and treat as clinically indicated.    Reason:  Cellulitis, deconditioning                  Pending Results     Date and Time Order Name Status Description    5/11/2018 1229 Blood culture Preliminary     5/11/2018 1229 Blood culture Preliminary             Statement of Approval     Ordered          05/14/18 1418  I have reviewed and agree with all the recommendations and orders detailed in this document.  EFFECTIVE NOW     Approved and electronically signed by:  Esau Greene MD           05/14/18 0844  I have reviewed and agree with all the recommendations and orders detailed in this document.  EFFECTIVE NOW     Approved and electronically signed by:  Esau Greene MD             Admission Information     Date & Time Provider Department Dept. Phone    5/11/2018 Soniya Patel MD [place] [address] [facility] [phone]      Your Vitals Were     Blood Pressure Pulse Temperature Respirations Height Weight    131/85 (BP Location: Left arm) 83 98.2  F (36.8  C) (Oral) 16 1.626 m (5' 4\") 49.9 kg (110 lb)    Pulse Oximetry BMI (Body " "Mass Index)                96% 18.88 kg/m2          [employer] Information     [employer] lets you send messages to your doctor, view your test results, renew your prescriptions, schedule appointments and more. To sign up, go to [email or web address] . Click on \"Log in\" on the left side of the screen, which will take you to the Welcome page. Then click on \"Sign up Now\" on the right side of the page.     You will be asked to enter the access code listed below, as well as some personal information. Please follow the directions to create your username and password.     Your access code is: [email or web address]  Expires: 2018 10:55 AM     Your access code will  in 90 days. If you need help or a new code, please call your [place] clinic or [phone].        Care EveryWhere ID     This is your Care EveryWhere ID. This could be used by other organizations to access your [place] medical records  [id number]        Equal Access to Services     JAN CASTRO : Hadii aad ku hadasho Soconi, waaxda luqadaha, qaybta kaalmada adeegyada, remi murdock . So [facility] [phone].    ATENCIÓN: Si habla español, tiene a renner disposición servicios gratuitos de asistencia lingüística. Llame al [phone].    We comply with applicable federal civil rights laws and [place] laws. We do not discriminate on the basis of race, color, national origin, age, disability, sex, sexual orientation, or gender identity.               Review of your medicines      START taking        Dose / Directions    acetaminophen 325 MG tablet   Commonly known as:  TYLENOL   Used for:  Dog bite, initial encounter        Dose:  650 mg   Take 2 tablets (650 mg) by mouth every 4 hours as needed for mild pain   Quantity:  50 tablet   Refills:  0       amoxicillin-clavulanate 875-125 MG per tablet   Commonly known as:  AUGMENTIN   Indication:  Infection of the Skin and/or Related Soft Tissue   Used for:  Dog bite, initial encounter        " Dose:  1 tablet   Take 1 tablet by mouth every 12 hours for 10 days   Quantity:  20 tablet   Refills:  0       order for DME   Used for:  Dog bite, initial encounter        Equipment being ordered: Walker Platform () Treatment Diagnosis: Difficulty with gait due to LE weakness and unable to hold walker with right hand   Quantity:  1 each   Refills:  0         CONTINUE these medicines which have NOT CHANGED        Dose / Directions    aspirin 81 MG tablet        1 tab po QD (Once per day)   Quantity:  100   Refills:  3       BUPROPION HCL PO        Dose:  150 mg   Take 150 mg by mouth every morning (Takes 2 x 75mg immediate release tablets for a total of 150mg in the morning- this dose was verified with patient's retail pharmacy - [facility])   Refills:  0       EMERGEN-C VITAMIN C Pack        Dose:  1 packet   Take 1 packet by mouth daily   Refills:  0       FLUOXETINE HCL PO        Dose:  40 mg   Take 40 mg by mouth every morning (Takes 2 x 20mg for a total of 40mg daily)   Refills:  0       LEVOTHYROXINE SODIUM PO        Dose:  88 mcg   Take 88 mcg by mouth daily   Refills:  0       TRAZODONE HCL PO        Dose:  150 mg   Take 150 mg by mouth At Bedtime (Takes 1.5 x 100mg tablet for a total of 150mg at bedtime)   Refills:  0       VITAMIN D (CHOLECALCIFEROL) PO        Dose:  1 tablet   Take 1 tablet by mouth daily (OTC: Pt unsure of strength)   Refills:  0       VITAMIN E NATURAL PO        Dose:  1 tablet   Take 1 tablet by mouth daily (OTC: Pt unsure of strength)   Refills:  0            Where to get your medicines      These medications were sent to [facility] [address] [place] [address] AT [address] [place] [id number]     Phone:  [phone]     acetaminophen 325 MG tablet    amoxicillin-clavulanate 875-125 MG per tablet         Some of these will need a paper prescription and others can be bought over the counter. Ask your nurse if you  have questions.     Bring a paper prescription for each of these medications     order for DME                Protect others around you: Learn how to safely use, store and throw away your medicines at www.disposemymeds.org.        ANTIBIOTIC INSTRUCTION     You've Been Prescribed an Antibiotic - Now What?  Your healthcare team thinks that you or your loved one might have an infection. Some infections can be treated with antibiotics, which are powerful, life-saving drugs. Like all medications, antibiotics have side effects and should only be used when necessary. There are some important things you should know about your antibiotic treatment.      Your healthcare team may run tests before you start taking an antibiotic.    Your team may take samples (e.g., from your blood, urine or other areas) to run tests to look for bacteria. These test can be important to determine if you need an antibiotic at all and, if you do, which antibiotic will work best.      Within a few days, your healthcare team might change or even stop your antibiotic.    Your team may start you on an antibiotic while they are working to find out what is making you sick.    Your team might change your antibiotic because test results show that a different antibiotic would be better to treat your infection.    In some cases, once your team has more information, they learn that you do not need an antibiotic at all. They may find out that you don't have an infection, or that the antibiotic you're taking won't work against your infection. For example, an infection caused by a virus can't be treated with antibiotics. Staying on an antibiotic when you don't need it is more likely to be harmful than helpful.      You may experience side effects from your antibiotic.    Like all medications, antibiotics have side effects. Some of these can be serious.    Let you healthcare team know if you have any known allergies when you are admitted to the hospital.    One  significant side effect of nearly all antibiotics is the risk of severe and sometimes deadly diarrhea caused by Clostridium difficile (C. Difficile). This occurs when a person takes antibiotics because some good germs are destroyed. Antibiotic use allows C. diificile to take over, putting patients at high risk for this serious infection.    As a patient or caregiver, it is important to understand your or your loved one's antibiotic treatment. It is especially important for caregivers to speak up when patients can't speak for themselves. Here are some important questions to ask your healthcare team.    What infection is this antibiotic treating and how do you know I have that infection?    What side effects might occur from this antibiotic?    How long will I need to take this antibiotic?    Is it safe to take this antibiotic with other medications or supplements (e.g., vitamins) that I am taking?     Are there any special directions I need to know about taking this antibiotic? For example, should I take it with food?    How will I be monitored to know whether my infection is responding to the antibiotic?    What tests may help to make sure the right antibiotic is prescribed for me?      Information provided by:  www.cdc.gov/getsmart  U.S. Department of Health and Human Services  Centers for disease Control and Prevention  National Center for Emerging and Zoonotic Infectious Diseases  Division of Healthcare Quality Promotion             Medication List: This is a list of all your medications and when to take them. Check marks below indicate your daily home schedule. Keep this list as a reference.      Medications           Morning Afternoon Evening Bedtime As Needed    acetaminophen 325 MG tablet   Commonly known as:  TYLENOL   Take 2 tablets (650 mg) by mouth every 4 hours as needed for mild pain   Last time this was given:  650 mg on 5/14/2018  1:15 PM                                   amoxicillin-clavulanate  875-125 MG per tablet   Commonly known as:  AUGMENTIN   Take 1 tablet by mouth every 12 hours for 10 days   Last time this was given:  1 tablet on 5/14/2018  8:59 AM                                   aspirin 81 MG tablet   1 tab po QD (Once per day)                                   BUPROPION HCL PO   Take 150 mg by mouth every morning (Takes 2 x 75mg immediate release tablets for a total of 150mg in the morning- this dose was verified with patient's retail pharmacy - [facility])   Last time this was given:  150 mg on 5/14/2018  8:14 AM                                   EMERGEN-C VITAMIN C Pack   Take 1 packet by mouth daily                                   FLUOXETINE HCL PO   Take 40 mg by mouth every morning (Takes 2 x 20mg for a total of 40mg daily)   Last time this was given:  40 mg on 5/14/2018  8:14 AM                                   LEVOTHYROXINE SODIUM PO   Take 88 mcg by mouth daily   Last time this was given:  88 mcg on 5/14/2018  8:14 AM                                   order for DME   Equipment being ordered: Walker Platform () Treatment Diagnosis: Difficulty with gait due to LE weakness and unable to hold walker with right hand                                TRAZODONE HCL PO   Take 150 mg by mouth At Bedtime (Takes 1.5 x 100mg tablet for a total of 150mg at bedtime)   Last time this was given:  150 mg on 5/13/2018 10:04 PM                                   VITAMIN D (CHOLECALCIFEROL) PO   Take 1 tablet by mouth daily (OTC: Pt unsure of strength)   Last time this was given:  1,000 Units on 5/14/2018  8:14 AM                                   VITAMIN E NATURAL PO   Take 1 tablet by mouth daily (OTC: Pt unsure of strength)   Last time this was given:  400 Units on 5/14/2018  8:14 AM

## 2018-05-11 NOTE — IP AVS SNAPSHOT
` `     [place] [address] [facility]: [phone]            Medication Administration Report for Mariposa Mendez as of 18 142   Legend:    Given Hold Not Given Due Canceled Entry Other Actions    Time Time (Time) Time  Time-Action       Inactive    Active    Linked        Medications 05/08/18 05/09/18 05/10/18 05/11/18 05/12/18 05/13/18 05/14/18    acetaminophen (TYLENOL) tablet 650 mg  Dose: 650 mg  Freq: EVERY 4 HOURS PRN Route: PO  PRN Reason: mild pain  Start: 18 1528   Admin Instructions: Alternate ibuprofen (if ordered) with acetaminophen.  Maximum acetaminophen dose from all sources = 75 mg/kg/day not to exceed 4 grams/day.    Admin. Amount: 2 tablet (2 × 325 mg tablet)  Last Admin: 18 1315  Dispense Loc:  ADS 66A         1803 (650 mg)-Given       2114 (650 mg)-Given        0800 (650 mg)-Given       1755 (650 mg)-Given       2204 (650 mg)-Given        0814 (650 mg)-Given       1315 (650 mg)-Given           amoxicillin-clavulanate (AUGMENTIN) 875-125 MG per tablet 1 tablet  Dose: 1 tablet  Freq: EVERY 12 HOURS SCHEDULED Route: PO  Indications of Use: SKIN AND SOFT TISSUE INFECTION  Start: 1845   End: 18 0759   Admin. Amount: 1 tablet  Last Admin: 18  Dispense Loc:  ADS 66A  Administrations Remainin859 (1 tablet)-Given       [ ]            aspirin EC tablet 81 mg  Dose: 81 mg  Freq: DAILY Route: PO  Start: 18 0900   Admin. Amount: 1 tablet (1 × 81 mg tablet)  Last Admin: 18  Dispense Loc:  ADS 66A         0813 (81 mg)-Given        0800 (81 mg)-Given        0814 (81 mg)-Given           buPROPion (WELLBUTRIN) tablet 150 mg  Dose: 150 mg  Freq: EVERY MORNING Route: PO  Start: 18 0900   Admin. Amount: 2 tablet (2 × 75 mg tablet)  Last Admin: 18  Dispense Loc:  ADS 66A         0812 (150 mg)-Given        0800 (150 mg)-Given        0814 (150 mg)-Given           cholecalciferol (vitamin D3) tablet  1,000 Units  Dose: 1,000 Units  Freq: DAILY Route: PO  Start: 05/12/18 0900   Admin. Amount: 1 tablet (1 × 1,000 Units tablet)  Last Admin: 05/14/18 0814  Dispense Loc:  DORIAN 66A         0813 (1,000 Units)-Given        0800 (1,000 Units)-Given        0814 (1,000 Units)-Given           enoxaparin (LOVENOX) injection 40 mg  Dose: 40 mg  Freq: EVERY 24 HOURS Route: SC  Start: 05/11/18 1800   Admin Instructions: HOLD if platelet count falls below 50% of baseline or less than 100,000/ L and notify provider.    Admin. Amount: 40 mg = 0.4 mL Conc: 40 mg/0.4 mL  Last Admin: 05/13/18 1755  Dispense Loc:  DORIAN TorresA  Volume: 0.4 mL        1813 (40 mg)-Given        1803 (40 mg)-Given        1755 (40 mg)-Given        [ ] 1800           FLUoxetine (PROzac) capsule 40 mg  Dose: 40 mg  Freq: EVERY MORNING Route: PO  Start: 05/12/18 0900   Admin. Amount: 2 capsule (2 × 20 mg capsule)  Last Admin: 05/14/18 0814  Dispense Loc:  DORIAN 66A         0812 (40 mg)-Given        0800 (40 mg)-Given        0814 (40 mg)-Given           ibuprofen (ADVIL/MOTRIN) tablet 600 mg  Dose: 600 mg  Freq: EVERY 6 HOURS PRN Route: PO  PRN Reason: other  PRN Comment: mild pain  Start: 05/11/18 1528   Admin Instructions: Alternate acetaminophen (if ordered) with ibuprofen    Admin. Amount: 1 tablet (1 × 600 mg tablet)  Dispense Loc:  DORIAN 66A               levothyroxine (SYNTHROID/LEVOTHROID) tablet 88 mcg  Dose: 88 mcg  Freq: DAILY Route: PO  Start: 05/12/18 0900   Admin. Amount: 1 tablet (1 × 88 mcg tablet)  Last Admin: 05/14/18 0814  Dispense Loc:  DORIAN 66A         0812 (88 mcg)-Given        0800 (88 mcg)-Given        0814 (88 mcg)-Given           melatonin tablet 1 mg  Dose: 1 mg  Freq: AT BEDTIME PRN Route: PO  PRN Reason: sleep  Start: 05/11/18 1528   Admin Instructions: Do not give unless at least 6 hours of uninterrupted sleep is expected.    Admin. Amount: 1 tablet (1 × 1 mg tablet)  Dispense Loc:  ADS 66A               morphine (PF) injection  2-4 mg  Dose: 2-4 mg  Freq: EVERY 2 HOURS PRN Route: IV  PRN Reason: moderate to severe pain  PRN Comment: pain control or improvement in physical function. Hold dose for analgesic side effects.  Start: 05/11/18 1528   Admin Instructions: Start at the lowest dose. May adjust dose by 1 mg every 2 hours as needed. Notify provider to assess for uncontrolled pain or analgesic side effects. Hold while on PCA or with regular IV opioid dosing.  For ordered doses up to 15 mg give IV Push undiluted over 4-5 minutes.    Admin. Amount: 2-4 mg  Dispense Loc:  ADS 66A               naloxone (NARCAN) injection 0.1-0.4 mg  Dose: 0.1-0.4 mg  Freq: EVERY 2 MIN PRN Route: IV  PRN Reason: opioid reversal  Start: 05/11/18 1528   Admin Instructions: For respiratory rate LESS than or EQUAL to 8.  Partial reversal dose:  0.1 mg titrated q 2 minutes for Analgesia Side Effects Monitoring Sedation Level of 3 (frequently drowsy, arousable, drifts to sleep during conversation).Full reversal dose:  0.4 mg bolus for Analgesia Side Effects Monitoring Sedation Level of 4 (somnolent, minimal or no response to stimulation).  For ordered doses up to 2mg give IVP. Give each 0.4mg over 15 seconds in emergency situations. For non-emergent situations further dilute in 9mL of NS to facilitate titration of response.    Admin. Amount: 0.1-0.4 mg = 0.25-1 mL Conc: 0.4 mg/mL  Dispense Loc:  ADS 66A  Volume: 1 mL               ondansetron (ZOFRAN-ODT) ODT tab 4 mg  Dose: 4 mg  Freq: EVERY 6 HOURS PRN Route: PO  PRN Reasons: nausea,vomiting  Start: 05/11/18 1528   Admin Instructions: This is Step 1 of nausea and vomiting management.  If nausea not resolved in 15 minutes, go to Step 2 prochlorperazine (COMPAZINE). Do not push through foil backing. Peel back foil and gently remove. Place on tongue immediately. Administration with liquid unnecessary  With dry hands, peel back foil backing and gently remove tablet; do not push oral disintegrating tablet through  foil backing; administer immediately on tongue and oral disintegrating tablet dissolves in seconds; then swallow with saliva; liquid not required.    Admin. Amount: 1 tablet (1 × 4 mg tablet)  Last Admin: 05/12/18 1438  Dispense Loc:  DORIAN 66A         1438 (4 mg)-Given            Or  ondansetron (ZOFRAN) injection 4 mg  Dose: 4 mg  Freq: EVERY 6 HOURS PRN Route: IV  PRN Reasons: nausea,vomiting  Start: 05/11/18 1528   Admin Instructions: This is Step 1 of nausea and vomiting management.  If nausea not resolved in 15 minutes, go to Step 2 prochlorperazine (COMPAZINE).  Irritant. For ordered doses up to 4 mg, give IV Push undiluted over 2-5 minutes.    Admin. Amount: 4 mg = 2 mL Conc: 4 mg/2 mL  Dispense Loc:  DORIAN 66A  Infused Over: 2-5 Minutes  Volume: 2 mL                      oxyCODONE-acetaminophen (PERCOCET) 5-325 MG per tablet 1-2 tablet  Dose: 1-2 tablet  Freq: EVERY 4 HOURS PRN Route: PO  PRN Reason: other  PRN Comment: pain control or improvement in physical function. Hold dose for analgesic side effects.  Start: 05/11/18 1528   Admin Instructions: Start with the lowest dose. May adjust dose by 1 tablet every 4 hours as needed. Hold dose for analgesic side effects.  Notify provider to assess for uncontrolled pain or analgesic side effects.  Hold while on PCA or with regular IV opioid dosing.  Maximum acetaminophen dose from all sources= 75 mg/kg/day not to exceed 4 grams    Admin. Amount: 1-2 tablet  Last Admin: 05/12/18 0812  Dispense Loc:  DORIAN 66A        1813 (1 tablet)-Given        0812 (1 tablet)-Given             polyethylene glycol (MIRALAX/GLYCOLAX) Packet 17 g  Dose: 17 g  Freq: DAILY PRN Route: PO  PRN Reason: constipation  Start: 05/11/18 1528   Admin Instructions: Give in 8oz of  water, juice, or soda. Hold for loose stools.  This is the second step of a three step constipation treatment.  1 Packet = 17 grams. Mixed prescribed dose in 8 ounces of water. Follow with 8 oz. of water.    Admin.  Amount: 17 g  Dispense Loc:  ADS 66A               senna-docusate (SENOKOT-S;PERICOLACE) 8.6-50 MG per tablet 1 tablet  Dose: 1 tablet  Freq: 2 TIMES DAILY PRN Route: PO  PRN Reason: constipation  Start: 05/11/18 1528   Admin Instructions: If no bowel movement in 24 hours, increase to 2 tablets PO.  Hold for loose stools.  This is the first step of a three step constipation treatment.    Admin. Amount: 1 tablet  Dispense Loc:  ADS 66A              Or  senna-docusate (SENOKOT-S;PERICOLACE) 8.6-50 MG per tablet 2 tablet  Dose: 2 tablet  Freq: 2 TIMES DAILY PRN Route: PO  PRN Reason: constipation  Start: 05/11/18 1528   Admin Instructions: Hold for loose stools.  This is the first step of a three step constipation treatment.    Admin. Amount: 2 tablet  Dispense Loc:  ADS 66A               sodium chloride 0.9% infusion  Rate: 75 mL/hr   Freq: CONTINUOUS Route: IV  Start: 05/11/18 1530   Last Admin: 05/14/18 0414  Dispense Loc: [facility] Floor Stock  Volume: 1,000 mL   Current Line: Peripheral IV 05/11/18 Left Upper arm       1626 ( )-New Bag        2111 ( )-New Bag        1221 ( )-New Bag        0414 ( )-New Bag           traZODone (DESYREL) tablet 150 mg  Dose: 150 mg  Freq: AT BEDTIME Route: PO  Start: 05/11/18 2200   Admin. Amount: 1 tablet (1 × 50 mg tablet) + 1 tablet (1 × 100 mg tablet)  Last Admin: 05/13/18 2204  Dispense Loc:  DORIAN 66A   Mixture Administration Information:   Medication Type Amount   traZODone 50 MG TABS Medications 50 mg   traZODone 100 MG TABS Medications 100 mg                2258 (150 mg)-Given        2114 (150 mg)-Given        2204 (150 mg)-Given        [ ] 2200           vitamin E capsule 400 Units  Dose: 400 Units  Freq: DAILY Route: PO  Start: 05/12/18 0900   Admin. Amount: 1 capsule (1 × 400 Units capsule)  Last Admin: 05/14/18 0814  Dispense Loc:  ADS 66A         0813 (400 Units)-Given        0800 (400 Units)-Given        0814 (400 Units)-Given          Completed  Medications  Medications 05/08/18 05/09/18 05/10/18 05/11/18 05/12/18 05/13/18 05/14/18         Dose: 500 mg  Freq: ONCE Route: IV  Indications of Use: SKIN AND SOFT TISSUE INFECTION  Last Dose: Stopped (18 1441)  Start: 18 1309   End: 18 1441   Admin. Amount: 500 mg = 100 mL Conc: 5 mg/mL  Last Admin: 18 1332  Dispense Loc: [facility]  Infused Over: 60 Minutes  Administrations Remainin  Volume: 100 mL   Current Line: Peripheral IV 18 Left Upper arm       1332 (500 mg)-New Bag       1441-Stopped             Discontinued Medications  Medications 05/08/18 05/09/18 05/10/18 05/11/18 05/12/18 05/13/18 05/14/18         Dose: 2 g  Freq: EVERY 24 HOURS Route: IV  Indications of Use: SKIN AND SOFT TISSUE INFECTION  Last Dose: 2 g (18 1255)  Start: 18 1300   End: 18 0838   Admin. Amount: 2 g  Last Admin: 18 1231  Dispense Loc:  ADS 66A  Infused Over: 30 Minutes  Volume: 20 mL   Current Line: Peripheral IV 18 Left Upper arm        1255 (2 g)-New Bag        1231 (2 g)-New Bag        0838-Med Discontinued         Dose: 500 mg  Freq: EVERY 8 HOURS Route: IV  Indications of Use: SKIN AND SOFT TISSUE INFECTION  Last Dose: 500 mg (18)  Start: 18   End: 18   Admin. Amount: 500 mg = 100 mL Conc: 5 mg/mL  Last Admin: 18 0610  Dispense Loc:  Main Pharmacy  Infused Over: 60 Minutes  Volume: 100 mL   Current Line: Peripheral IV 18 Left Upper arm       2258 (500 mg)-New Bag        0636 (500 mg)-New Bag       1358 (500 mg)-New Bag       2113 (500 mg)-New Bag        0554 (500 mg)-New Bag       1342 (500 mg)-New Bag       2204 (500 mg)-New Bag        0610 (500 mg)-New Bag       0838-Med Discontinued

## 2018-05-11 NOTE — IP AVS SNAPSHOT
"[place] [address] MEDICAL SPECIALTY UNIT: [phone]                                              INTERAGENCY TRANSFER FORM - PHYSICIAN ORDERS   2018                    Hospital Admission Date: 2018  DARIAN ANDUJAR   : 1945  Sex: Female        Attending Provider: Soniya Patel MD     Allergies:  No Known Allergies    Infection:  None   Service:  HOSPITALIST    Ht:  1.626 m (5' 4\")   Wt:  49.9 kg (110 lb)   Admission Wt:  49.9 kg (110 lb)    BMI:  18.88 kg/m 2   BSA:  1.5 m 2            Patient PCP Information     Provider PCP Type    Patria Shea MD General      ED Clinical Impression     Diagnosis Description Comment Added By Time Added    Cellulitis [L03.90] Cellulitis [L03.90]  Susy Valencia 2018  1:22 PM      Hospital Problems as of 2018              Priority Class Noted POA    Cellulitis Medium  2018 Yes      Non-Hospital Problems as of 2018              Priority Class Noted    Hyperlipidemia Medium  2004    Hypothyroidism Medium  2004    Tobacco use disorder Medium  2005    Depressive disorder, not elsewhere classified Medium  2005    Insomnia Medium  2005    Generalized osteoarthrosis, unspecified site Medium  Unknown    Disorder of bone and cartilage Medium  Unknown      Code Status History     Date Active Date Inactive Code Status Order ID Comments User Context    2018  2:18 PM  Full Code [id number]  Esau Greene MD Outpatient    2018  8:44 AM 2018  2:18 PM Full Code [id number]  Esau Greene MD Outpatient    2018  3:28 PM 2018  8:44 AM Full Code [id number]  Soniya Patel MD Inpatient         Medication Review      START taking        Dose / Directions Comments    acetaminophen 325 MG tablet   Commonly known as:  TYLENOL   Used for:  Dog bite, initial encounter        Dose:  650 mg   Take 2 tablets (650 mg) by mouth every 4 hours as needed for mild pain   Quantity:  50 tablet   Refills:  0        " amoxicillin-clavulanate 875-125 MG per tablet   Commonly known as:  AUGMENTIN   Indication:  Infection of the Skin and/or Related Soft Tissue   Used for:  Dog bite, initial encounter        Dose:  1 tablet   Take 1 tablet by mouth every 12 hours for 10 days   Quantity:  20 tablet   Refills:  0        order for DME   Used for:  Dog bite, initial encounter        Equipment being ordered: Walker Platform () Treatment Diagnosis: Difficulty with gait due to LE weakness and unable to hold walker with right hand   Quantity:  1 each   Refills:  0          CONTINUE these medications which have NOT CHANGED        Dose / Directions Comments    aspirin 81 MG tablet        1 tab po QD (Once per day)   Quantity:  100   Refills:  3        BUPROPION HCL PO        Dose:  150 mg   Take 150 mg by mouth every morning (Takes 2 x 75mg immediate release tablets for a total of 150mg in the morning- this dose was verified with patient's retail pharmacy - [facility])   Refills:  0        EMERGEN-C VITAMIN C Pack        Dose:  1 packet   Take 1 packet by mouth daily   Refills:  0        FLUOXETINE HCL PO        Dose:  40 mg   Take 40 mg by mouth every morning (Takes 2 x 20mg for a total of 40mg daily)   Refills:  0        LEVOTHYROXINE SODIUM PO        Dose:  88 mcg   Take 88 mcg by mouth daily   Refills:  0        TRAZODONE HCL PO        Dose:  150 mg   Take 150 mg by mouth At Bedtime (Takes 1.5 x 100mg tablet for a total of 150mg at bedtime)   Refills:  0        VITAMIN D (CHOLECALCIFEROL) PO        Dose:  1 tablet   Take 1 tablet by mouth daily (OTC: Pt unsure of strength)   Refills:  0        VITAMIN E NATURAL PO        Dose:  1 tablet   Take 1 tablet by mouth daily (OTC: Pt unsure of strength)   Refills:  0                After Care     Activity       Your activity upon discharge: activity as tolerated       Activity - Up with nursing assistance           Advance Diet as Tolerated       Follow this diet upon discharge: Orders Placed  This Encounter      Combination Diet Regular Diet Adult      Diet       Diet       Follow this diet upon discharge: Orders Placed This Encounter      Combination Diet Regular Diet Adult       Discharge Instructions       [facility] to resume RN OT and PT       General info for SNF       Length of Stay Estimate: Short Term Care: Estimated # of Days <30  Condition at Discharge: Stable  Level of care:skilled   Rehabilitation Potential: Good  Admission H&P remains valid and up-to-date: Yes  Recent Chemotherapy: N/A  Use Nursing Home Standing Orders: Yes       Mantoux instructions       Give two-step Mantoux (PPD) Per Facility Policy Yes       Wound care and dressings       Instructions to care for your wound at home: keep wound clean and dry.  Daily gauze dressing changes as needed.             Referrals     Occupational Therapy Adult Consult       Evaluate and treat as clinically indicated.    Reason:  Cellulitis, deconditioning       Physical Therapy Adult Consult       Evaluate and treat as clinically indicated.    Reason:  Cellulitis, deconditioning              MD face to face encounter       Documentation of Face to Face and Certification for Home Health Services    I certify that patient: Mariposa Mendez is under my care and that I, or a nurse practitioner or physician's assistant working with me, had a face-to-face encounter that meets the physician face-to-face encounter requirements with this patient on: 5/14/2018.    This encounter with the patient was in whole, or in part, for the following medical condition, which is the primary reason for home health care: right hand cellulitis.    I certify that, based on my findings, the following services are medically necessary home health services: Nursing and Occupational Therapy.    My clinical findings support the need for the above services because: Nurse is needed: to assess the wound and help with dressing changes. and Occupational Therapy Services are  needed to assess and treat cognitive ability and address ADL safety due to impairment in her right hand function.    Further, I certify that my clinical findings support that this patient is homebound (i.e. absences from home require considerable and taxing effort and are for medical reasons or Spiritism services or infrequently or of short duration when for other reasons) because: Requires assistance of another person or specialized equipment to access medical services because patient: Requires supervision of another for safe transfer...    Based on the above findings. I certify that this patient is confined to the home and needs intermittent skilled nursing care, physical therapy and/or speech therapy.  The patient is under my care, and I have initiated the establishment of the plan of care.  This patient will be followed by a physician who will periodically review the plan of care.  Physician/Provider to provide follow up care: [facility] physician (the Medicare certified [place] provider): Esau Greene  Physician Signature: See electronic signature associated with these discharge orders.  Date: 5/14/2018                  Follow-Up Appointment Instructions     Future Labs/Procedures    Follow-up and recommended labs and tests      Comments:    Follow up with Dr. Muñoz in 2-5 days ([phone]).    Follow-up and recommended labs and tests      Comments:    Appointment with Dr Muñoz (ortho) 5/18 at 11:30 am    Add: [address]  [facility] [facility]      Follow-Up Appointment Instructions     Follow-up and recommended labs and tests        Follow up with Dr. Muñoz in 2-5 days ([phone]).       Follow-up and recommended labs and tests        Appointment with Dr Muñoz (lata) 5/18 at 11:30 am    Add: [address]  [address] [place] [address]             Statement of Approval     Ordered          05/14/18 1418  I have reviewed and agree with all the recommendations and orders detailed in this  document.  EFFECTIVE NOW     Approved and electronically signed by:  Esau Greene MD           05/14/18 0844  I have reviewed and agree with all the recommendations and orders detailed in this document.  EFFECTIVE NOW     Approved and electronically signed by:  Esau Greene MD

## 2018-05-11 NOTE — CONSULTS
Consult Date:  05/11/2018      CHIEF COMPLAINT:  Right hand pain.      HISTORY OF PRESENT ILLNESS:  Mariposa Mendez is a 73-year-old right-handed retired woman.  I have been asked to see her at the request of Dr. Farnaz Allison for a hand surgical consultation regarding a problem with her right hand.      Last night this lady was bitten by her neighbor's dog over multiple spots involving her right hand.  She was seen at a local urgent care clinic this morning where her wounds were cleansed, a dorsal wrist laceration was sutured and Dermabond was applied to the remainder of her wounds.  She was not given any antibiotics.  X-rays also reveal a fourth metacarpal fracture.      Nevertheless, over the last 24 hours the patient has noted increasing pain, redness and swelling in her hand.  She presents to the emergency room at this time for more definitive treatment.      At the present time, Ms. Mendez complains of rather diffuse pain in her hand and some nonspecific tingling along its dorsal aspect.  This was an isolated injury.  No prior problems with the area.      PAST MEDICAL HISTORY:  Depression, osteoarthritis, hyperlipidemia, hypothyroidism.      MEDICATIONS ON ADMISSION:   1.  Aspirin.   2.  Celexa.   3.  Levoxyl.   4.  Trazodone.      ALLERGIES:  NO KNOWN DRUG ALLERGIES.      PRIOR SURGERIES:  Hysterectomy and some type of operation on her right foot.      I reviewed this lady's patient profile, complete review of systems,  family and social histories as documented in the EMR and in her emergency room notes.      PHYSICAL EXAMINATION:   GENERAL:  Reveals a 73-year-old right-handed woman who is alert and oriented to time, place and person.  Skin is warm and dry.  She is in mild distress because of right hand pain.   VITAL SIGNS:  Temperature 97.2, blood pressure 150/69, pulse 72, respirations 16.   LUNGS: There is no pulmonary difficulty.     EXTREMITIES: No adenopathy in the right upper extremity.  Examination  of  the right hand reveals diffuse low-grade swelling along the dorsal aspect.  There is some nonspecific erythema on the dorsal hand extending up into the dorsal proximal forearm.  She has a neatly-sutured 3 cm transverse laceration along the dorsal wrist without drainage.  There are multiple small puncture wounds both dorsally and volarly in the hand without significant drainage.  Swelling appears more interstitial rather than fluctuant.  Swelling extends out into the fingers, and both digital and wrist motion are limited by pain.  Neurovascular exam is intact.  The left hand has no swelling, tenderness or limitation of motion.     LAB:  WBC  12.1    IMAGING:  X-rays of the right hand (3 views) taken at [facility] urgent care earlier today demonstrate a minimally-angulated fracture of the proximal ring metacarpal.      DIAGNOSES:     1.  Dog bite, right hand, with cellulitis.     2.  Right IV metacarpal fracture.      At the present time, I believe that this patient's condition is best managed with a course of IV antibiotics.  I spoke with the ER physician about this.  We will start with some IV Rocephin and Flagyl.  We will splint her hand.  This is not an open fracture, and I do not believe at the present time that anything requires debridement.      Ms. Andujar will elevate her hand, and I will see her in followup to monitor her clinical course.         JAROD LR MD             D: 2018   T: 2018   MT: [id number]      Name:     DARIAN ANDUJAR   MRN:      [id number]        Account:       [id number]   :      1945           Consult Date:  2018      Document: [id number]       cc: Ramone Lr MD

## 2018-05-11 NOTE — H&P
[facility]    History and Physical  Hospitalist       Date of Admission:  5/11/2018    Assessment & Plan   Mariposa Mendez is a 73 year old female with history of depression, osteoporosis, hypothyroidism who presents  after a dog bite, yesterday and pain and swelling in her arms and hands.    Right arm and hand cellulitis after a dog bite  - pictures in EPIC ,seen by hand surgery and recommending-Splint and IV antibiotics  -Ceftriaxone and Flagyl initiated in the ED, continue  - pain control with oxycodone, Tylenol and IV morphine as needed, bowel regimen in place   History of depression  -resume PTA antidepressant ,once verified by pharmacy     Osteoporosis  -Patient had recent fall and hip fracture.  Currently uses walker.  PT OT to follow while inhouse    Hypothyroidism  -Resume PTA Synthroid once verified by pharmacy    # Pain Assessment:  Current Pain Score 5/11/2018   Pain score (0-10) 5   - Mariposa is experiencing pain due to Dog bite and cellulitis. Pain management was discussed and the plan was created in a collaborative fashion.  Mariposa's response to the current recommendations: engaged  - Opioid regimen: IV morphine, oxycodone  - Response to opioid medications: Reduction of symptoms .  - Bowel regimen: miralax and docusate        DVT Prophylaxis: Enoxaparin (Lovenox) SQ  Code Status: Full Code    Disposition: Expected discharge in 2-3 days once clinical improvement and clearance by hand surgery..    Soniya Patel MD    Primary Care Physician   [facility]    Chief Complaint   Hand pain and swelling    History is obtained from the patient    History of Present Illness   Mariposa Mendez is a 73 year old female with history of depression, osteoporosis, hypothyroidism who presents  after a dog bite, yesterday and pain and swelling in her arms and hands.      The patient had a dog bite by a neighbor's dog(who is upto date on vaccination) yesterday.  She went to urgent care yesterday.   Her  wound were repaired with Dermabond.  She was given tetanus shots and she was prescribed Augmentin, however had not started so far.  In the afternoon she started experiencing increased pain and swelling in her arm.  She was unable to sleep last night.  So she presented to the ED today.  Patient denies any fever, nausea vomiting, chest pain, headache, dizziness, lightheadedness, changes in bowel or bladder habits.    In the ED she was seen by Dr Allison and Dr Muñoz(hand surgery). Labs positive for mild leukocytosis of 12.1.  Vitals were unremarkable except for some minimal hypertension.  Blood cultures were sent and she was started on ceftriaxone and Flagyl upon recommendation from surgery.    Past Medical History    I have reviewed this patient's medical history and updated it with pertinent information if needed.   Past Medical History:   Diagnosis Date     Depressive disorder, not elsewhere classified      Generalized osteoarthrosis, unspecified site      OSTEOPENIA OF HIP      Other and unspecified hyperlipidemia      Unspecified hypothyroidism        Past Surgical History   I have reviewed this patient's surgical history and updated it with pertinent information if needed.  Past Surgical History:   Procedure Laterality Date     SURGICAL HISTORY OF -   age 39 or 40    SUSANNAH and unilateral ooph (fibroids)     SURGICAL HISTORY OF -   2005    right foot surgery with hardware       Prior to Admission Medications   Prior to Admission Medications   Prescriptions Last Dose Informant Patient Reported? Taking?   ASPIRIN 81 MG OR TABS   No No   Si tab po QD (Once per day)   CELEXA 20 MG OR TABS   Yes No   Si TABLET DAILY   LEVOXYL 100 MCG OR TABS   No No   Sig: ONE DAILY IN THE MORNING   TRAZODONE HCL 50 MG OR TABS   No No   Si - 2 TABs PO at bedtime prn insomnia      Facility-Administered Medications: None     Allergies   No Known Allergies    Social History   I have reviewed this patient's social history  and updated it with pertinent information if needed. Mariposa Mendez  reports that she has been smoking Cigarettes.  She has a 15.00 pack-year smoking history. She has never used smokeless tobacco. She reports that she drinks alcohol.    Family History   I have reviewed this patient's family history and updated it with pertinent information if needed.   Family History   Problem Relation Age of Onset     Breast Cancer Mother      Dx age 70     Arthritis Mother      OA     Arthritis Maternal Grandfather      Lipids Mother      Lipids Sister      Ericka. 1/2 sibling     Hypertension Mother      Thyroid Disease Sister      Ericka. 1/2 sibling     Psychotic Disorder Brother      Bipolar. (1/2 sibling)     Family History Negative Daughter      Lipids Sister      Marian.      Psychotic Disorder Mother      depression     Psychotic Disorder Sister      Ericka. 1/2 sib. Depression     Psychotic Disorder Sister      Marian. 1/2 sib. Depression       Review of Systems   The 10 point Review of Systems is negative other than noted in the HPI or here.   Patient had a recent hip fracture after a fall and currently uses a walker for ambulation.    Physical Exam   Temp: 97.2  F (36.2  C) Temp src: Oral BP: 150/69   Heart Rate: 72 Resp: 16 SpO2: 100 % O2 Device: None (Room air)    Vital Signs with Ranges  Temp:  [97.2  F (36.2  C)] 97.2  F (36.2  C)  Heart Rate:  [72] 72  Resp:  [16] 16  BP: (150)/(69) 150/69  SpO2:  [94 %-100 %] 100 %  110 lbs 0 oz    Exam:  Constitutional: Awake, alert and no distress. Appears comfortable  Head: Normocephalic. No masses, lesions, tenderness or abnormalities  ENT: ENT exam normal, no neck nodes or sinus tenderness  Cardiovascular: RRR.  no murmurs, no rubs or JVD  Respiratory:normal WOB,b/l equal air entry, no wheezes or crackles   Gastrointestinal: Abdomen soft, non-tender. BS normal. No masses, organomegaly  : Deferred   extremities :erythema and swelling in the right arm and hand( see pictures in ED  provider note)- was already splinted by the time she was evaluated by me,no edema , no clubbing or cyanosis    Musculoakeletal :right wrist joint swelling otherwise no , erythema . No obvious deformity   Skin: Warm and dry, no rash  Neurologic: Cranial nerves II-XII grossly intact , power 5/5, Reflexes normal and symmetric. Sensation grossly WNL.    Data   Data reviewed today:  I personally reviewed the hand image(s) showing There is moderately severe soft tissue swelling of the dorsal aspect of the hand with air within the soft tissues. Additional more mild swelling and air within the soft tissues of the dorsal distal forearm. There is a minimally displaced oblique fracture involving the fourth metacarpal proximally without definitive intra-articular extension. No additional acute fracture or dislocation. Advanced degenerative changes of the base of the thumb and second and third distal interphalangeal joints additional scattered more mild to moderate degenerative changes of the hand and wrist. Dense sclerotic foci adjacent to the ulnar styloid process may be due to old injury..from care everywhere    Recent Labs  Lab 05/11/18  1220   WBC 12.1*   HGB 12.5   MCV 92         POTASSIUM 3.6   CHLORIDE 104   CO2 27   BUN 12   CR 0.81   ANIONGAP 7   JAYE 9.6   *       No results found for this or any previous visit (from the past 24 hour(s)).

## 2018-05-12 ENCOUNTER — APPOINTMENT (OUTPATIENT)
Dept: MRI IMAGING | Facility: CLINIC | Age: 73
DRG: 603 | End: 2018-05-12
Attending: ORTHOPAEDIC SURGERY
Payer: MEDICARE

## 2018-05-12 ENCOUNTER — APPOINTMENT (OUTPATIENT)
Dept: PHYSICAL THERAPY | Facility: CLINIC | Age: 73
DRG: 603 | End: 2018-05-12
Attending: INTERNAL MEDICINE
Payer: MEDICARE

## 2018-05-12 LAB
ANION GAP SERPL CALCULATED.3IONS-SCNC: 9 MMOL/L (ref 3–14)
BUN SERPL-MCNC: 12 MG/DL (ref 7–30)
CALCIUM SERPL-MCNC: 8.1 MG/DL (ref 8.5–10.1)
CHLORIDE SERPL-SCNC: 108 MMOL/L (ref 94–109)
CO2 SERPL-SCNC: 21 MMOL/L (ref 20–32)
CREAT SERPL-MCNC: 0.75 MG/DL (ref 0.52–1.04)
ERYTHROCYTE [DISTWIDTH] IN BLOOD BY AUTOMATED COUNT: 13.4 % (ref 10–15)
GFR SERPL CREATININE-BSD FRML MDRD: 75 ML/MIN/1.7M2
GLUCOSE SERPL-MCNC: 145 MG/DL (ref 70–99)
HCT VFR BLD AUTO: 34 % (ref 35–47)
HGB BLD-MCNC: 11 G/DL (ref 11.7–15.7)
MCH RBC QN AUTO: 30.1 PG (ref 26.5–33)
MCHC RBC AUTO-ENTMCNC: 32.4 G/DL (ref 31.5–36.5)
MCV RBC AUTO: 93 FL (ref 78–100)
PLATELET # BLD AUTO: 253 10E9/L (ref 150–450)
POTASSIUM SERPL-SCNC: 3.7 MMOL/L (ref 3.4–5.3)
RBC # BLD AUTO: 3.65 10E12/L (ref 3.8–5.2)
SODIUM SERPL-SCNC: 138 MMOL/L (ref 133–144)
WBC # BLD AUTO: 9.2 10E9/L (ref 4–11)

## 2018-05-12 PROCEDURE — 97530 THERAPEUTIC ACTIVITIES: CPT | Mod: GP | Performed by: PHYSICAL THERAPIST

## 2018-05-12 PROCEDURE — 40000193 ZZH STATISTIC PT WARD VISIT: Performed by: PHYSICAL THERAPIST

## 2018-05-12 PROCEDURE — 85027 COMPLETE CBC AUTOMATED: CPT | Performed by: INTERNAL MEDICINE

## 2018-05-12 PROCEDURE — A9270 NON-COVERED ITEM OR SERVICE: HCPCS | Mod: GY | Performed by: INTERNAL MEDICINE

## 2018-05-12 PROCEDURE — 25000128 H RX IP 250 OP 636: Performed by: INTERNAL MEDICINE

## 2018-05-12 PROCEDURE — 25000125 ZZHC RX 250: Performed by: INTERNAL MEDICINE

## 2018-05-12 PROCEDURE — 97116 GAIT TRAINING THERAPY: CPT | Mod: GP | Performed by: PHYSICAL THERAPIST

## 2018-05-12 PROCEDURE — 25000132 ZZH RX MED GY IP 250 OP 250 PS 637: Mod: GY | Performed by: INTERNAL MEDICINE

## 2018-05-12 PROCEDURE — 36415 COLL VENOUS BLD VENIPUNCTURE: CPT | Performed by: INTERNAL MEDICINE

## 2018-05-12 PROCEDURE — 99232 SBSQ HOSP IP/OBS MODERATE 35: CPT | Performed by: INTERNAL MEDICINE

## 2018-05-12 PROCEDURE — 97161 PT EVAL LOW COMPLEX 20 MIN: CPT | Mod: GP | Performed by: PHYSICAL THERAPIST

## 2018-05-12 PROCEDURE — 12000000 ZZH R&B MED SURG/OB

## 2018-05-12 PROCEDURE — 80048 BASIC METABOLIC PNL TOTAL CA: CPT | Performed by: INTERNAL MEDICINE

## 2018-05-12 PROCEDURE — 73221 MRI JOINT UPR EXTREM W/O DYE: CPT | Mod: RT

## 2018-05-12 RX ADMIN — METRONIDAZOLE 500 MG: 500 INJECTION, SOLUTION INTRAVENOUS at 13:58

## 2018-05-12 RX ADMIN — ACETAMINOPHEN 650 MG: 325 TABLET ORAL at 21:14

## 2018-05-12 RX ADMIN — ACETAMINOPHEN 650 MG: 325 TABLET ORAL at 18:03

## 2018-05-12 RX ADMIN — ONDANSETRON 4 MG: 4 TABLET, ORALLY DISINTEGRATING ORAL at 14:38

## 2018-05-12 RX ADMIN — TRAZODONE HYDROCHLORIDE 150 MG: 100 TABLET ORAL at 21:14

## 2018-05-12 RX ADMIN — OXYCODONE HYDROCHLORIDE AND ACETAMINOPHEN 1 TABLET: 5; 325 TABLET ORAL at 08:12

## 2018-05-12 RX ADMIN — VITAMIN D, TAB 1000IU (100/BT) 1000 UNITS: 25 TAB at 08:13

## 2018-05-12 RX ADMIN — LEVOTHYROXINE SODIUM 88 MCG: 88 TABLET ORAL at 08:12

## 2018-05-12 RX ADMIN — SODIUM CHLORIDE, PRESERVATIVE FREE: 5 INJECTION INTRAVENOUS at 21:11

## 2018-05-12 RX ADMIN — METRONIDAZOLE 500 MG: 500 INJECTION, SOLUTION INTRAVENOUS at 21:13

## 2018-05-12 RX ADMIN — VITAMIN E CAP 400 UNIT 400 UNITS: 400 CAP at 08:13

## 2018-05-12 RX ADMIN — ENOXAPARIN SODIUM 40 MG: 40 INJECTION SUBCUTANEOUS at 18:03

## 2018-05-12 RX ADMIN — BUPROPION HYDROCHLORIDE 150 MG: 75 TABLET, FILM COATED ORAL at 08:12

## 2018-05-12 RX ADMIN — METRONIDAZOLE 500 MG: 500 INJECTION, SOLUTION INTRAVENOUS at 06:36

## 2018-05-12 RX ADMIN — ASPIRIN 81 MG: 81 TABLET, COATED ORAL at 08:13

## 2018-05-12 RX ADMIN — FLUOXETINE 40 MG: 20 CAPSULE ORAL at 08:12

## 2018-05-12 RX ADMIN — CEFTRIAXONE SODIUM 2 G: 2 INJECTION, POWDER, FOR SOLUTION INTRAMUSCULAR; INTRAVENOUS at 12:55

## 2018-05-12 NOTE — PLAN OF CARE
"Problem: Patient Care Overview  Goal: Plan of Care/Patient Progress Review  Outcome: No Change  Admission    Patient arrives to room 610 via cart from ED.  Care plan note: Pt is A&Ox4 Up with one assist/walker. VSS on RA, R hand in splint, JANICE dog bite site. C/o \"heaviness\" and pain from splint. OK to loosen ACE wrap per ortho. PO percocet given x1 with some relief. R arm elevated. IVF infusing @ 75ml/hr. IV abx continued. Tolerating regular diet. Sling available for pt use. D/c pending progress.     Inpatient nursing criteria listed below were met:    PCD's Documented: Yes  Skin issues/needs documented :NA  Isolation education started/completed NA  Fall Prevention: Care plan updated, Education given and documented Yes  Care Plan initiated: Yes  Home medications documented in belongings flowsheet: NA  Patient belongings documented in belongings flowsheet: Yes  Reminder note (belongings/ medications) placed in discharge instructions:NA  Admission profile/ required documentation complete: Yes      "

## 2018-05-12 NOTE — PLAN OF CARE
Problem: Patient Care Overview  Goal: Plan of Care/Patient Progress Review  Outcome: Improving  Pt A/O x 4, VSS on RA. Pt reports pain R hand, PRN Percocet administered, pt reports relief. PRN zofran given, stated she felt nauseous, found relief. Splint removed by Dr. Muñoz, verbal order to wrap hand with gauze and kerlix. MRI checklist completed. PIV infusing. LS clear bilaterally. Regular diet, A-1 with transfers/gaitbelt. F/U Ortho, PT. Will continue to monitor.

## 2018-05-12 NOTE — PLAN OF CARE
Problem: Patient Care Overview  Goal: Plan of Care/Patient Progress Review  PT: Orders received, evaluation completed, and treatment initiated. Patient is a 72 y/o female admitted after sustaining a dog bite to R hand, noted to have cellulitis and 4th metacarpal fracture. Pt lives alone in a house with 3 stairs to enter/exit and 12 stairs to access laundry in the basement. Pt reports completing functional mobility independently with use of FWW. Prior to current admission, pt was receiving home PT.     Discharge Planner PT   Patient plan for discharge: Return home.   Current status: Pt reported R UE pain of 7/10. Pt performed supine-sit with SBA. Sit <> stand and ambulation of 200 feet with R platform walker and CGA, pt required assist to lilliam/doff R UE from platform. Noted pt had increased difficulty navigating platform walker with turns and narrow spaces. Pt agreeable to sit up in chair at end of session.   Barriers to return to prior living situation: Lives alone, Stairs-not yet assessed  Recommendations for discharge: Return home with assist from family for household tasks and navigation of stairs. Pt reports daughter, grandson, and nephew will be able to assist.   Rationale for recommendations: Anticipate pt will continue to progress towards independence in order to safely return home with assist from family. If patient's family is unable to provide assist and pt continues to require physical assist at time of discharge, pt would then benefit from TCU prior to returning home alone.        Entered by: Khadra Lafleur 05/12/2018 10:20 AM

## 2018-05-12 NOTE — PROGRESS NOTES
"Mariposa Mendez  5/12/2018    S: Less pain today.    O:  Blood pressure 117/57, pulse 77, temperature 98.2  F (36.8  C), temperature source Oral, resp. rate 18, height 1.626 m (5' 4\"), weight 49.9 kg (110 lb), SpO2 97 %.    Much less erythema.  Still moderate swelling of R hand.  Multiple puncture wounds.  Serosanguinous (not purulent) drainage.  NVI.    Labs:   Hemoglobin   Date Value Ref Range Status   05/12/2018 11.0 (L) 11.7 - 15.7 g/dL Final     No results found for: INR  Lab Results   Component Value Date    WBC 9.2 05/12/2018     A: Dog bite R hand.     PLAN: Continue IV antibiotics.  I will order a hand MRI to r/o abscess.        "

## 2018-05-12 NOTE — PROGRESS NOTES
"   05/12/18 0839   Quick Adds   Type of Visit Initial PT Evaluation   Living Environment   Lives With alone   Living Arrangements house   Number of Stairs to Enter Home 3  (Left sided railing going up the stairs)   Number of Stairs Within Home 12  (B railings)   Transportation Available family or friend will provide  (Nephew and Daughter provide transportation)   Living Environment Comment Pt reports all needs are met on the main level except laundry in the basement.    Self-Care   Usual Activity Tolerance good   Current Activity Tolerance moderate   Regular Exercise yes   Activity/Exercise Type strength training  (Home PT 1x/week)   Equipment Currently Used at Home walker, rolling   Functional Level Prior   Ambulation 1-->assistive equipment   Transferring 1-->assistive equipment   Fall history within last six months no   Prior Functional Level Comment Pt reports completing functional mobiltiy independently with use of FWW.    General Information   Onset of Illness/Injury or Date of Surgery - Date 05/11/18   Referring Physician Soniya Patel MD   Patient/Family Goals Statement Return home.    Pertinent History of Current Problem (include personal factors and/or comorbidities that impact the POC) 72 y/o female admitted after sustaining a R hand dog bite with cellulitis and R 4th metacarpal fracture.    Precautions/Limitations fall precautions   General Observations Pt in supine upon arrival of therapist.    General Info Comments Up ad silver.    Cognitive Status Examination   Orientation orientation to person, place and time  (\"Hospital\")   Level of Consciousness alert   Follows Commands and Answers Questions 100% of the time   Personal Safety and Judgment intact   Pain Assessment   Patient Currently in Pain (R arm pain: 7/10)   Integumentary/Edema   Integumentary/Edema Comments R arm covered with splint.   Posture    Posture Comments Noted forward head and shoulder posture upon standing at walker.    Range of Motion " "(ROM)   ROM Comment Unable to assess R hand ROM d/t splint, otherwise B LES and L UE WFL.    Strength   Strength Comments Not formally assessed, pt demonstrated at least 3/5 grossly in B LEs with functional mobility.    Bed Mobility   Bed Mobility Comments Supine-sit, SBA.    Transfer Skills   Transfer Comments Sit <> stand with R platform walker and CGA.    Gait   Gait Comments Pt amb 10' with R platform walker and CGA.    Balance   Balance Comments Noted good sitting and standing balance at walker.    Sensory Examination   Sensory Perception Comments Pt denies numbness/tingling in extremities.    General Therapy Interventions   Planned Therapy Interventions bed mobility training;gait training;transfer training;strengthening   Clinical Impression   Criteria for Skilled Therapeutic Intervention yes, treatment indicated   PT Diagnosis Difficulty with gait.    Influenced by the following impairments Pain, Generalized weakness, Decreased activity tolerance   Functional limitations due to impairments Limited functional mobility requiring AD and assist.    Clinical Presentation Evolving/Changing   Clinical Presentation Rationale Based on PMH, current presentation, and social support.    Clinical Decision Making (Complexity) Low complexity   Therapy Frequency` daily   Predicted Duration of Therapy Intervention (days/wks) 3 days   Anticipated Equipment Needs at Discharge (Platform for walker)   Anticipated Discharge Disposition Home with Assist;Home with Home Therapy   Risk & Benefits of therapy have been explained Yes   Patient, Family & other staff in agreement with plan of care Yes   [facility] AM-PAC  \"6 Clicks\" V.2 Basic Mobility Inpatient Short Form   1. Turning from your back to your side while in a flat bed without using bedrails? 4 - None   2. Moving from lying on your back to sitting on the side of a flat bed without using bedrails? 3 - A Little   3. Moving to and from a bed to a chair (including a " wheelchair)? 3 - A Little   4. Standing up from a chair using your arms (e.g., wheelchair, or bedside chair)? 3 - A Little   5. To walk in hospital room? 3 - A Little   6. Climbing 3-5 steps with a railing? 2 - A Lot   Basic Mobility Raw Score (Score out of 24.Lower scores equate to lower levels of function) 18   Total Evaluation Time   Total Evaluation Time (Minutes) 10

## 2018-05-12 NOTE — PROGRESS NOTES
[facility]  Hospitalist Progress Note  Esau Greene MD    Assessment & Plan   Mariposa Mendez is a 73 year old female with PMHx significant for depression, osteoporosis, and hypothyroidism who presented for evaluation after a dog bite.     Dog bite with right hand cellulitis,  Right 4th metacarpal fracture:  Patient was bitten by her neighbor's dog over multiple spots involving her right hand on 05/10.  She was seen at a local urgent care clinic on 05/10 and again on 05/11 where her wounds were cleansed, a dorsal wrist laceration was sutured and Dermabond was applied to the remainder of her wounds.   X-ray also showed minimally displaced oblique fracture involving the fourth metacarpal proximally without definitive intra-articular extension. WBC 12.1 on admission, then normalized. Patient was given tetanus shot in the urgent care on 05/10. She was started on IV ceftriaxone and metronidazole in ED on 05/11 and admitted for further management.      Recent Labs  Lab 05/12/18  0832 05/11/18  1220   WBC 9.2 12.1*       -Cont IV ceftriaxone and metronidazole  -Cont splint  -PT assessment  -Pain control with prn oxycodone, Tylenol and IV morphine  -Ortho is following and appreciate assist with management    History of depression  Chronic and stable on PTA bupropion and fluoxetine     Hypothyroidism  Chronic and stable on PTA Synthroid      # Pain Assessment:  Current Pain Score 5/12/2018   Patient currently in pain? -   Pain score (0-10) 7   Pain location -   Pain descriptors -   - Mariposa is experiencing pain due to dog bite and cellulitis. Pain management was discussed and the plan was created in a collaborative fashion.  Mariposa's response to the current recommendations: engaged  - Opioid regimen:  IV morphine, oxycodone  - Response to opioid medications: Reduction of symptoms   - Bowel regimen: miralax and docusate      Active Diet Order      Combination Diet Regular Diet Adult     DVT Prophylaxis:  Enoxaparin (Lovenox) SQ    Code Status: Full Code     Disposition: Expected discharge in 1-2 days pending clinical course.    D/W RN.    Interval History   Patient continued to have pain in her right hand. She reports no fever. Chills, or other complaints. No new issue since admit.    Data reviewed today: I reviewed all new labs and imaging over the last 24 hours.    Physical Exam   Temp: 98.2  F (36.8  C) Temp src: Oral BP: 117/57 Pulse: 77 Heart Rate: 77 Resp: 18 SpO2: 97 % O2 Device: None (Room air)    Vitals:    05/11/18 1200 05/11/18 1529   Weight: 49.9 kg (110 lb) 49.9 kg (110 lb)     Vital Signs with Ranges  Temp:  [97.2  F (36.2  C)-98.5  F (36.9  C)] 98.2  F (36.8  C)  Pulse:  [77] 77  Heart Rate:  [72-79] 77  Resp:  [16-18] 18  BP: (113-150)/(57-80) 117/57  SpO2:  [94 %-100 %] 97 %  I/O's Last 24 hours  I/O last 3 completed shifts:  In: 240 [P.O.:240]  Out: -     Constitutional: Comfortable, no acute distress  HEENT: No conjunctival pallor.  Neurologic: Awake, alert, fully oriented  Neck: Supple, no elevated JVP  Respiratory: Clear to auscultation  Cardiovascular: Normal S1 and S2. Regular rhythm and rate  GI: Abdomen soft, non tender, non distended  Extremities: No calf tenderness, right hand swelling  Skin/integument: Right hand wounds are dressed    Medications   All medications were reviewed.    sodium chloride 75 mL/hr at 05/11/18 1626       aspirin  81 mg Oral Daily     buPROPion (WELLBUTRIN) tablet 150 mg  150 mg Oral QAM     cefTRIAXone  2 g Intravenous Q24H     cholecalciferol  1,000 Units Oral Daily     enoxaparin  40 mg Subcutaneous Q24H     FLUoxetine (PROzac) capsule 40 mg  40 mg Oral QAM     levothyroxine (SYNTHROID/LEVOTHROID) tablet 88 mcg  88 mcg Oral Daily     metroNIDAZOLE  500 mg Intravenous Q8H     traZODone (DESYREL) tablet 150 mg  150 mg Oral At Bedtime     vitamin E  400 Units Oral Daily        Data     Recent Labs  Lab 05/12/18  0832 05/11/18  1220   WBC 9.2 12.1*   HGB 11.0* 12.5    MCV 93 92    292    138   POTASSIUM 3.7 3.6   CHLORIDE 108 104   CO2 21 27   BUN 12 12   CR 0.75 0.81   ANIONGAP 9 7   JAYE 8.1* 9.6   * 100*       No results found for this or any previous visit (from the past 24 hour(s)).

## 2018-05-12 NOTE — PROVIDER NOTIFICATION
MD Notification    Notified Person: MD    Notified Person Name: Dr. Muñoz    Notification Date/Time: 4:16 PM May 12, 2018    Notification Interaction: Telephone    Purpose of Notification: Wound, excessive drainage, need orders for dressing changes     Orders Received: Every shift dressing changes and PRN, with guaze, may use wound cleanser.     Comments:

## 2018-05-12 NOTE — PLAN OF CARE
Problem: Patient Care Overview  Goal: Plan of Care/Patient Progress Review  Outcome: No Change  Pt A/O x4. Up w/ 1A/GB/wlker. Tolerating reg diet, VSS on RA. Voiding adequately. R. Arm in splint. No numbness/tingling in fingers. Pain controlled w/ oral Percocet, but denies pain this shift. IVF infusing w/ intermittent Abx. Plan to d/c to home when appropriate. Will continue to monitor.

## 2018-05-13 PROCEDURE — 25000128 H RX IP 250 OP 636: Performed by: INTERNAL MEDICINE

## 2018-05-13 PROCEDURE — 36415 COLL VENOUS BLD VENIPUNCTURE: CPT | Performed by: INTERNAL MEDICINE

## 2018-05-13 PROCEDURE — A9270 NON-COVERED ITEM OR SERVICE: HCPCS | Mod: GY | Performed by: INTERNAL MEDICINE

## 2018-05-13 PROCEDURE — 82565 ASSAY OF CREATININE: CPT | Performed by: INTERNAL MEDICINE

## 2018-05-13 PROCEDURE — 12000000 ZZH R&B MED SURG/OB

## 2018-05-13 PROCEDURE — 99232 SBSQ HOSP IP/OBS MODERATE 35: CPT | Performed by: INTERNAL MEDICINE

## 2018-05-13 PROCEDURE — 25000132 ZZH RX MED GY IP 250 OP 250 PS 637: Mod: GY | Performed by: INTERNAL MEDICINE

## 2018-05-13 PROCEDURE — 25000125 ZZHC RX 250: Performed by: INTERNAL MEDICINE

## 2018-05-13 RX ADMIN — ACETAMINOPHEN 650 MG: 325 TABLET ORAL at 08:00

## 2018-05-13 RX ADMIN — VITAMIN E CAP 400 UNIT 400 UNITS: 400 CAP at 08:00

## 2018-05-13 RX ADMIN — VITAMIN D, TAB 1000IU (100/BT) 1000 UNITS: 25 TAB at 08:00

## 2018-05-13 RX ADMIN — SODIUM CHLORIDE, PRESERVATIVE FREE: 5 INJECTION INTRAVENOUS at 12:21

## 2018-05-13 RX ADMIN — TRAZODONE HYDROCHLORIDE 150 MG: 100 TABLET ORAL at 22:04

## 2018-05-13 RX ADMIN — ENOXAPARIN SODIUM 40 MG: 40 INJECTION SUBCUTANEOUS at 17:55

## 2018-05-13 RX ADMIN — ASPIRIN 81 MG: 81 TABLET, COATED ORAL at 08:00

## 2018-05-13 RX ADMIN — LEVOTHYROXINE SODIUM 88 MCG: 88 TABLET ORAL at 08:00

## 2018-05-13 RX ADMIN — FLUOXETINE 40 MG: 20 CAPSULE ORAL at 08:00

## 2018-05-13 RX ADMIN — METRONIDAZOLE 500 MG: 500 INJECTION, SOLUTION INTRAVENOUS at 05:54

## 2018-05-13 RX ADMIN — BUPROPION HYDROCHLORIDE 150 MG: 75 TABLET, FILM COATED ORAL at 08:00

## 2018-05-13 RX ADMIN — METRONIDAZOLE 500 MG: 500 INJECTION, SOLUTION INTRAVENOUS at 13:42

## 2018-05-13 RX ADMIN — ACETAMINOPHEN 650 MG: 325 TABLET ORAL at 22:04

## 2018-05-13 RX ADMIN — METRONIDAZOLE 500 MG: 500 INJECTION, SOLUTION INTRAVENOUS at 22:04

## 2018-05-13 RX ADMIN — ACETAMINOPHEN 650 MG: 325 TABLET ORAL at 17:55

## 2018-05-13 RX ADMIN — CEFTRIAXONE SODIUM 2 G: 2 INJECTION, POWDER, FOR SOLUTION INTRAMUSCULAR; INTRAVENOUS at 12:31

## 2018-05-13 ASSESSMENT — PAIN DESCRIPTION - DESCRIPTORS: DESCRIPTORS: ACHING;TENDER

## 2018-05-13 NOTE — PROGRESS NOTES
[facility]  Hospitalist Progress Note  Esau Greene MD    Assessment & Plan   Mariposa Mendez is a 73 year old female with PMHx significant for depression, osteoporosis, and hypothyroidism who presented for evaluation after a dog bite.     Dog bite with right hand cellulitis with abscess,  Right 4th metacarpal fracture:  Patient was bitten by her neighbor's dog over multiple spots involving her right hand on 05/10.  She was seen at a local urgent care clinic on 05/10 and again on 05/11 where her wounds were cleansed, a dorsal wrist laceration was sutured and Dermabond was applied to the remainder of her wounds. X-ray also showed minimally displaced oblique fracture involving the fourth metacarpal proximally without definitive intra-articular extension. WBC 12.1 on admission, then normalized. Patient was given tetanus shot in the urgent care on 05/10. She was started on IV ceftriaxone and metronidazole in ED on 05/11 and admitted for further management. She was seen by ortho and MRI obtained 05/13 which demonstrated fluid collection along the volar aspect of the ulna possibly associated with the distal radioulnar joint or radiocarpal joint. It also showed 1.0 x 0.5 x 1.1 cm low signal intensity structure within the fluid volar to the ulna, possibly a large articular body versus a foreign body. There was also extensive soft tissue edema in the hand, suspected extensor tendon sheath complex fluid, and fourth metacarpal proximal metaphyseal nondisplaced or minimally displaced fracture.    Blood cultures remain negative. Pain is controlled.      Recent Labs  Lab 05/12/18  0832 05/11/18  1220   WBC 9.2 12.1*       -Cont IV ceftriaxone and metronidazole  -Cont splint  -PT assessment done and suggested home PT.  -Pain control with prn oxycodone, Tylenol and IV morphine  -Ortho is following and appreciate assist with management. Patient likely needs I&D.  -CBC, Cr in am    History of depression  Chronic  and stable on PTA bupropion and fluoxetine     Hypothyroidism  Chronic and stable on PTA Synthroid      # Pain Assessment:  Current Pain Score 5/13/2018   Patient currently in pain? sleeping: patient not able to self report   Pain score (0-10) -   Pain location -   Pain descriptors -   - Mariposa is experiencing pain due to dog bite and cellulitis. Pain management was discussed and the plan was created in a collaborative fashion.  Mariposa's response to the current recommendations: engaged  - Opioid regimen:  IV morphine, oxycodone  - Response to opioid medications: Reduction of symptoms   - Bowel regimen: miralax and docusate      Active Diet Order      Combination Diet Regular Diet Adult     DVT Prophylaxis: Enoxaparin (Lovenox) SQ    Code Status: Full Code     Disposition: Expected discharge pending clinical course and possible I&D. Anticipate 1-2+ days of inpatient management.    D/W RN.    Interval History   Patient reports less pain in her right hand. She denies fever. Chills, or other complaints. No new issue overnight.    Data reviewed today: I reviewed all new labs and imaging over the last 24 hours.    Physical Exam   Temp: 98  F (36.7  C) Temp src: Oral BP: 127/81 Pulse: 83 Heart Rate: 78 Resp: 18 SpO2: 96 % O2 Device: None (Room air)    Vitals:    05/11/18 1200 05/11/18 1529   Weight: 49.9 kg (110 lb) 49.9 kg (110 lb)     Vital Signs with Ranges  Temp:  [97.7  F (36.5  C)-98  F (36.7  C)] 98  F (36.7  C)  Pulse:  [83] 83  Heart Rate:  [68-78] 78  Resp:  [16-18] 18  BP: (126-141)/(53-85) 127/81  SpO2:  [96 %-97 %] 96 %  I/O's Last 24 hours  I/O last 3 completed shifts:  In: 1111 [P.O.:440; I.V.:671]  Out: -     Constitutional: Comfortable, no acute distress  HEENT: No conjunctival pallor.  Neurologic: Awake, alert, fully oriented  Neck: Supple, no elevated JVP  Respiratory: Clear to auscultation  Cardiovascular: Normal S1 and S2. Regular rhythm and rate  GI: Abdomen soft, non tender, non distended  Extremities:  No calf tenderness, right hand swelling  Skin/integument: Right hand wounds are dressed    Medications   All medications were reviewed.    sodium chloride 75 mL/hr at 05/12/18 2111       aspirin  81 mg Oral Daily     buPROPion (WELLBUTRIN) tablet 150 mg  150 mg Oral QAM     cefTRIAXone  2 g Intravenous Q24H     cholecalciferol  1,000 Units Oral Daily     enoxaparin  40 mg Subcutaneous Q24H     FLUoxetine (PROzac) capsule 40 mg  40 mg Oral QAM     levothyroxine (SYNTHROID/LEVOTHROID) tablet 88 mcg  88 mcg Oral Daily     metroNIDAZOLE  500 mg Intravenous Q8H     traZODone (DESYREL) tablet 150 mg  150 mg Oral At Bedtime     vitamin E  400 Units Oral Daily        Data     Recent Labs  Lab 05/12/18  0832 05/11/18  1220   WBC 9.2 12.1*   HGB 11.0* 12.5   MCV 93 92    292    138   POTASSIUM 3.7 3.6   CHLORIDE 108 104   CO2 21 27   BUN 12 12   CR 0.75 0.81   ANIONGAP 9 7   JAYE 8.1* 9.6   * 100*       Recent Results (from the past 24 hour(s))   MR Hand Right w/o Contrast    Narrative    MR HAND RIGHT WITHOUT CONTRAST May 12, 2018 8:31 PM     HISTORY: Dog bite to hand. Rule out abscess, has ring metacarpal  fracture.     TECHNIQUE: Coronal T1, fat-suppressed T1, fat-suppressed T2, and  inversion recovery, sagittal T1, and transverse T1 and fat-suppressed  T2-weighted pulse sequences are submitted. No contrast-enhanced images  are submitted.    COMPARISON: No radiographs are available for comparison.    FINDINGS: There is a nondisplaced or minimally displaced transverse  oblique fracture involving the fourth metacarpal proximal metaphysis.  No extension into the carpometacarpal joint is demonstrated.  Degenerative arthrosis with mild subchondral marrow edema is noted at  the first carpometacarpal joint. Scattered cysts or erosions are noted  within the carpal bones, not well evaluated with this scan. Along the  volar aspect of the distal ulna, there is an irregular shaped low  signal intensity object  measuring 1.0 x 0.5 x 1.1 cm, possibly a  distal radioulnar joint or radiocarpal joint articular body versus a  foreign body, also not well seen on the edge of the scan. Adjacent  fluid is noted which may be related to a recess of the radiocarpal or  distal radioulnar joint. Extensive soft tissue edema is noted,  greatest along the dorsum of the hand, but also present within the  volar soft tissues. Complex extensor tendon sheath fluid is suggested.  This might be better delineated with contrast enhancement if  clinically warranted. However, there is no other evidence of discrete  soft tissue fluid collection or abscess. Visualized portions of the  flexor and extensor tendons appear to be grossly intact.      Impression    IMPRESSION:  1. Fluid collection along the volar aspect of the ulna possibly  associated with the distal radioulnar joint or radiocarpal joint.  2. 1.0 x 0.5 x 1.1 cm low signal intensity structure within the fluid  volar to the ulna, possibly a large articular body versus a foreign  body. Radiographic correlation is advised.  3. Extensive soft tissue edema in the hand, greatest dorsally but also  present in the volar soft tissues. Extensor tendon sheath complex  fluid is suspected and might be better delineated with contrast  enhancement if clinically warranted. However, there is no other  definitive evidence of soft tissue abscess.  4. Fourth metacarpal proximal metaphyseal nondisplaced or minimally  displaced fracture.  5. Nonspecific cysts or erosions in multiple carpal bones, not well  evaluated with this scan.  6. First carpometacarpal joint degenerative arthrosis.

## 2018-05-13 NOTE — PROGRESS NOTES
"Mariposa E Andrea  5/13/2018      S: Mild pain managed with tylenol.    O: Blood pressure 127/81, pulse 83, temperature 98  F (36.7  C), temperature source Oral, resp. rate 18, height 1.626 m (5' 4\"), weight 49.9 kg (110 lb), SpO2 96 %.    Decreased swelling R dorsal hand.  Less erythema.   Mild-moderate serosanguinous drainage from dorsal bite wounds.  No swelling or tenderness over distal volar ulna.  NVI.    MRI for the most part demonstrates diffuse dorsal hand edema.  There is a small fluid accumulation along the volar distal ulna with a small enclosed loose body adjacent to the radiocarpal and/or PHILL joints.  Fracture noted of proximal ring metacarpal.  No tendon or nerve injury.    A: Dog bite R hand.     PLAN: I don't think that there is anything based upon the MRI that requires surgical drainage.  The fluid accumulation near the distal volar ulna is no were near the area of trauma and is likely arthritic in nature.    I'll get OT to help the patient on digital and wrist ROM.  Continue IV antibiotics.  Timing of conversion to orals and eventual discharge up to the hospitalist service.        "

## 2018-05-13 NOTE — PLAN OF CARE
Problem: Patient Care Overview  Goal: Plan of Care/Patient Progress Review  Outcome: Improving  Alert, disoriented to time, VSS on RA. Pt reports pain R hand, PRN tylenol administered x2 and ice packs, with some relief. Dressing changed, CDI. MRI results pending. IVF infusing, on IV abx. Ax1 with transfers, gait belt and walker. F/U Ortho, PT. Will continue to monitor.

## 2018-05-13 NOTE — PLAN OF CARE
Problem: Patient Care Overview  Goal: Plan of Care/Patient Progress Review  Outcome: Improving  Pt A/O x 4, VSS on RA. Pt reports pain R hand, PRN Tylenol administered, pt reports relief. R hand swollen, red and warm to the touch. +2 edema RUE. Dressing changed per plan of care. MRI completed, per Dr. Muñoz, no need for surgery at this time, will continue to IV abx. PIV infusing. Spoke with daughter, Efren and gave her an update. LS clear bilaterally. +BS in all four quadrants, passing flatus. F/U Ortho, PT. Will continue to monitor.

## 2018-05-13 NOTE — PLAN OF CARE
Problem: Skin and Soft Tissue Infection (Adult)  Goal: Signs and Symptoms of Listed Potential Problems Will be Absent, Minimized or Managed (Skin and Soft Tissue Infection)  Signs and symptoms of listed potential problems will be absent, minimized or managed by discharge/transition of care (reference Skin and Soft Tissue Infection (Adult) CPG).   VSS, Oriented to person, situation, forgets she is in hospital and timing of day.  Has not used call light, sets off alarm.  Right hand dressing CDI overnight.  Pt is up with one, belt and walker.  Voiding adequately, tolerating diet.  Continuing with antibiotics and dressing changes as needed.

## 2018-05-14 ENCOUNTER — APPOINTMENT (OUTPATIENT)
Dept: PHYSICAL THERAPY | Facility: CLINIC | Age: 73
DRG: 603 | End: 2018-05-14
Payer: MEDICARE

## 2018-05-14 ENCOUNTER — APPOINTMENT (OUTPATIENT)
Dept: OCCUPATIONAL THERAPY | Facility: CLINIC | Age: 73
DRG: 603 | End: 2018-05-14
Attending: ORTHOPAEDIC SURGERY
Payer: MEDICARE

## 2018-05-14 VITALS
TEMPERATURE: 98.2 F | BODY MASS INDEX: 19.41 KG/M2 | SYSTOLIC BLOOD PRESSURE: 118 MMHG | DIASTOLIC BLOOD PRESSURE: 65 MMHG | HEART RATE: 73 BPM | WEIGHT: 113.1 LBS | OXYGEN SATURATION: 97 % | RESPIRATION RATE: 18 BRPM

## 2018-05-14 VITALS
HEIGHT: 64 IN | OXYGEN SATURATION: 96 % | SYSTOLIC BLOOD PRESSURE: 131 MMHG | DIASTOLIC BLOOD PRESSURE: 85 MMHG | TEMPERATURE: 98.2 F | RESPIRATION RATE: 16 BRPM | HEART RATE: 83 BPM | BODY MASS INDEX: 18.78 KG/M2 | WEIGHT: 110 LBS

## 2018-05-14 LAB
CREAT SERPL-MCNC: 0.66 MG/DL (ref 0.52–1.04)
CREAT SERPL-MCNC: 0.67 MG/DL (ref 0.52–1.04)
ERYTHROCYTE [DISTWIDTH] IN BLOOD BY AUTOMATED COUNT: 13.1 % (ref 10–15)
GFR SERPL CREATININE-BSD FRML MDRD: 86 ML/MIN/1.7M2
GFR SERPL CREATININE-BSD FRML MDRD: 88 ML/MIN/1.7M2
HCT VFR BLD AUTO: 37.9 % (ref 35–47)
HGB BLD-MCNC: 12.4 G/DL (ref 11.7–15.7)
MCH RBC QN AUTO: 30.4 PG (ref 26.5–33)
MCHC RBC AUTO-ENTMCNC: 32.7 G/DL (ref 31.5–36.5)
MCV RBC AUTO: 93 FL (ref 78–100)
PLATELET # BLD AUTO: 324 10E9/L (ref 150–450)
RBC # BLD AUTO: 4.08 10E12/L (ref 3.8–5.2)
WBC # BLD AUTO: 7 10E9/L (ref 4–11)

## 2018-05-14 PROCEDURE — 85027 COMPLETE CBC AUTOMATED: CPT | Performed by: INTERNAL MEDICINE

## 2018-05-14 PROCEDURE — 40000133 ZZH STATISTIC OT WARD VISIT

## 2018-05-14 PROCEDURE — A9270 NON-COVERED ITEM OR SERVICE: HCPCS | Mod: GY | Performed by: INTERNAL MEDICINE

## 2018-05-14 PROCEDURE — 99239 HOSP IP/OBS DSCHRG MGMT >30: CPT | Performed by: INTERNAL MEDICINE

## 2018-05-14 PROCEDURE — 25000128 H RX IP 250 OP 636: Performed by: INTERNAL MEDICINE

## 2018-05-14 PROCEDURE — 25000132 ZZH RX MED GY IP 250 OP 250 PS 637: Mod: GY | Performed by: INTERNAL MEDICINE

## 2018-05-14 PROCEDURE — 97116 GAIT TRAINING THERAPY: CPT | Mod: GP | Performed by: PHYSICAL THERAPY ASSISTANT

## 2018-05-14 PROCEDURE — 97535 SELF CARE MNGMENT TRAINING: CPT | Mod: GO

## 2018-05-14 PROCEDURE — 82565 ASSAY OF CREATININE: CPT | Performed by: INTERNAL MEDICINE

## 2018-05-14 PROCEDURE — 25000125 ZZHC RX 250: Performed by: INTERNAL MEDICINE

## 2018-05-14 PROCEDURE — 36415 COLL VENOUS BLD VENIPUNCTURE: CPT | Performed by: INTERNAL MEDICINE

## 2018-05-14 PROCEDURE — 97165 OT EVAL LOW COMPLEX 30 MIN: CPT | Mod: GO

## 2018-05-14 PROCEDURE — 40000193 ZZH STATISTIC PT WARD VISIT: Performed by: PHYSICAL THERAPY ASSISTANT

## 2018-05-14 PROCEDURE — 97110 THERAPEUTIC EXERCISES: CPT | Mod: GO

## 2018-05-14 PROCEDURE — 97530 THERAPEUTIC ACTIVITIES: CPT | Mod: GP | Performed by: PHYSICAL THERAPY ASSISTANT

## 2018-05-14 RX ORDER — ACETAMINOPHEN 325 MG/1
650 TABLET ORAL EVERY 4 HOURS PRN
Qty: 50 TABLET | Refills: 0 | Status: ON HOLD | OUTPATIENT
Start: 2018-05-14 | End: 2022-01-01

## 2018-05-14 RX ADMIN — LEVOTHYROXINE SODIUM 88 MCG: 88 TABLET ORAL at 08:14

## 2018-05-14 RX ADMIN — VITAMIN D, TAB 1000IU (100/BT) 1000 UNITS: 25 TAB at 08:14

## 2018-05-14 RX ADMIN — VITAMIN E CAP 400 UNIT 400 UNITS: 400 CAP at 08:14

## 2018-05-14 RX ADMIN — BUPROPION HYDROCHLORIDE 150 MG: 75 TABLET, FILM COATED ORAL at 08:14

## 2018-05-14 RX ADMIN — FLUOXETINE 40 MG: 20 CAPSULE ORAL at 08:14

## 2018-05-14 RX ADMIN — AMOXICILLIN AND CLAVULANATE POTASSIUM 1 TABLET: 875; 125 TABLET, FILM COATED ORAL at 08:59

## 2018-05-14 RX ADMIN — METRONIDAZOLE 500 MG: 500 INJECTION, SOLUTION INTRAVENOUS at 06:10

## 2018-05-14 RX ADMIN — ACETAMINOPHEN 650 MG: 325 TABLET ORAL at 13:15

## 2018-05-14 RX ADMIN — ACETAMINOPHEN 650 MG: 325 TABLET ORAL at 08:14

## 2018-05-14 RX ADMIN — SODIUM CHLORIDE, PRESERVATIVE FREE: 5 INJECTION INTRAVENOUS at 04:14

## 2018-05-14 RX ADMIN — ASPIRIN 81 MG: 81 TABLET, COATED ORAL at 08:14

## 2018-05-14 ASSESSMENT — ACTIVITIES OF DAILY LIVING (ADL): PREVIOUS_RESPONSIBILITIES: MEAL PREP;HOUSEKEEPING;LAUNDRY;SHOPPING;YARDWORK;MEDICATION MANAGEMENT;FINANCES;DRIVING

## 2018-05-14 NOTE — PLAN OF CARE
Problem: Patient Care Overview  Goal: Plan of Care/Patient Progress Review  PT: Per discussion with RN, discharge plan changed to have pt go to TCU prior to return home.     Physical Therapy Discharge Summary    Reason for therapy discharge:    Discharged to transitional care facility.    Progress towards therapy goal(s). See goals on Care Plan in [facility] electronic health record for goal details.  Goals met    Therapy recommendation(s):    Continued therapy is recommended.  Rationale/Recommendations:  Pt will benefit from continued skilled PT intervention in order to increase independence and safety with functional mobility.

## 2018-05-14 NOTE — PLAN OF CARE
Problem: Patient Care Overview  Goal: Plan of Care/Patient Progress Review  Outcome: Adequate for Discharge Date Met: 05/14/18  Discharge    Patient discharged to TCU tristin via Daughter with belongings  Care plan note: AOx4; forgetful at times. VSS. Up w/ 1-assist and GB/walker. C/o of pain in right hand, R hand dressing- CDI, new dressing placed, given PRN tylenol x2 and ice with some relief.     Listed belongings gathered and returned to patient. Yes  Care Plan and Patient education resolved: Yes  Prescriptions if needed, hard copies sent with patient  NA  Home and hospital acquired medications returned to patient: NA  Medication Bin checked and emptied on discharge Yes  Follow up appointment made for patient: Going to TCU, yes

## 2018-05-14 NOTE — PLAN OF CARE
Problem: Patient Care Overview  Goal: Plan of Care/Patient Progress Review  Discharge Planner PT   Patient plan for discharge: Return home.   Current status: Pt reported pain 3/10.  Performed bed mobility and sit to/from stand transfers independently.  Gait training x 500 ft using platform walker with SBA initially but able to progress to IND.  Pt performed 5 stairs using 1 rail with SBA.   Barriers to return to prior living situation: Lives alone  Recommendations for discharge: Return home with assist from family for household tasks and navigation of stairs per plan established by the PT. . Pt reports daughter, grandson, and nephew will be able to assist.   Rationale for recommendations: Pt is progressing well and is confident she is ready to discharge home alone.  Plans to resume home PT.  Issued UE platform for her walker at pt's request.       Entered by: Diana Alonzo 05/14/2018 8:42 AM       Pt is discharging home today with assist of family as needed.  Will have Home PT/OT/RN.  PT goals met.

## 2018-05-14 NOTE — PROGRESS NOTES
"Mariposa Mendez  5/14/2018    S: Clinically better each day.  Pain managed with tylenol.    O:  Blood pressure 121/69, pulse 83, temperature 97.6  F (36.4  C), temperature source Oral, resp. rate 16, height 1.626 m (5' 4\"), weight 49.9 kg (110 lb), SpO2 94 %.    Decreased swelling and tenderness R hand.  Fingers stiff.  No significant erythema.  NVI.    A: Dog bite, R hand.     PLAN: OK to discharge from my standpoint on oral antibiotics per hospitalist choice for 7-10 days.  F/u with me in my office later this week.  Will start outpatient hand therapy then.        "

## 2018-05-14 NOTE — DISCHARGE SUMMARY
[facility]  Discharge Summary        Mariposa Mendez MRN# [id number]   YOB: 1945 Age: 73 year old     Date of Admission:  5/11/2018  Date of Discharge:  5/14/2018  Admitting Physician:  Soniya Patel MD  Discharge Physician:             Esau Greene MD  Discharging Service:             Hospitalist     Primary Provider: [facility]  Primary Care Physician Phone Number: None     Discharge Diagnoses:     Dog bite with right hand cellulitis and right 4th metatarsal fracture    Other/Chronic Medical Problems:      Depression  Hypothyroidism  Osteopenia  Osteoarthritis  Problem Oriented Hospital Course   Mariposa Mendez is a 73 year old female with PMHx significant for depression, osteoporosis, and hypothyroidism who presented for evaluation after a dog bite. She was admitted for further management.    For a detailed history and physical exam, please refer to the dictated admission note by Dr. Soniya Patel on 05/11/2018.      Dog bite with right hand cellulitis,  Right 4th metacarpal fracture:  Patient was bitten by her neighbor's dog over multiple spots involving her right hand on 05/10.  She was seen at a local urgent care clinic on 05/10 and again on 05/11 where her wounds were cleansed, a dorsal wrist laceration was sutured and Dermabond was applied to the remainder of her wounds. X-ray also showed minimally displaced oblique fracture involving the fourth metacarpal proximally without definitive intra-articular extension. Patient was given tetanus shot in the urgent care on 05/10. She was started on IV ceftriaxone and metronidazole in ED on 05/11 and admitted for further management. She was seen by ortho and MRI obtained 05/13 which demonstrated fluid collection along the volar aspect of the ulna possibly associated with the distal radioulnar joint or radiocarpal joint. It also showed 1.0 x 0.5 x 1.1 cm low signal intensity structure within the fluid volar to the ulna,  possibly a large articular body versus a foreign body. There was also extensive soft tissue edema in the hand, suspected extensor tendon sheath complex fluid, and fourth metacarpal proximal metaphyseal nondisplaced or minimally displaced fracture. She responded to IV antibiotic therapy and there was no indication for surgical intervention. WBC was 12.1 on admission, which normalized.Blood cultures remained negative. She was discharged on oral Augmentin for 10 days with plan for reassessment in the Ortho clinic in 2-5 days. She was also arranged for PT/OT at TCU.      History of depression  Chronic and stable on PTA bupropion and fluoxetine      Hypothyroidism  Chronic and stable on PTA Synthroid          Code Status:      Full Code        Important Results:      Na 138, K 3.7, Cr 0.67, WBC 7. Hgb 12.4, PLT 324K        Pending Results:        Unresulted Labs Ordered in the Past 30 Days of this Admission     Date and Time Order Name Status Description    5/11/2018 1229 Blood culture Preliminary     5/11/2018 1229 Blood culture Preliminary               Discharge Instructions and Follow-Up:      Follow-up Appointments     Follow-up and recommended labs and tests        Follow up with Dr. Muñoz in 2-5 days ([phone]).                        Discharge Disposition:      Discharged to TCU        Discharge Medications:        Current Discharge Medication List      START taking these medications    Details   acetaminophen (TYLENOL) 325 MG tablet Take 2 tablets (650 mg) by mouth every 4 hours as needed for mild pain  Qty: 50 tablet, Refills: 0    Associated Diagnoses: Dog bite, initial encounter      amoxicillin-clavulanate (AUGMENTIN) 875-125 MG per tablet Take 1 tablet by mouth every 12 hours for 10 days  Qty: 20 tablet, Refills: 0    Associated Diagnoses: Dog bite, initial encounter         CONTINUE these medications which have NOT CHANGED    Details   ASPIRIN 81 MG OR TABS 1 tab po QD (Once per day)  Qty: 100,  "Refills: 3      BUPROPION HCL PO Take 150 mg by mouth every morning (Takes 2 x 75mg immediate release tablets for a total of 150mg in the morning- this dose was verified with patient's retail pharmacy - [facility])      FLUOXETINE HCL PO Take 40 mg by mouth every morning (Takes 2 x 20mg for a total of 40mg daily)      LEVOTHYROXINE SODIUM PO Take 88 mcg by mouth daily      Multiple Vitamins-Minerals (EMERGEN-C VITAMIN C) PACK Take 1 packet by mouth daily      TRAZODONE HCL PO Take 150 mg by mouth At Bedtime (Takes 1.5 x 100mg tablet for a total of 150mg at bedtime)      VITAMIN D, CHOLECALCIFEROL, PO Take 1 tablet by mouth daily (OTC: Pt unsure of strength)      VITAMIN E NATURAL PO Take 1 tablet by mouth daily (OTC: Pt unsure of strength)                 Allergies:       No Known Allergies        Consultations This Hospital Stay:      Dr. Isaac Muñoz of orthopedics        Condition and Physical on Discharge:      Discharge condition: Stable   Vitals: Blood pressure 131/85, pulse 83, temperature 98.2  F (36.8  C), temperature source Oral, resp. rate 16, height 1.626 m (5' 4\"), weight 49.9 kg (110 lb), SpO2 96 %.           Diet and Activity:     After Care Instructions     Activity       Your activity upon discharge: activity as tolerated            Diet       Follow this diet upon discharge: Orders Placed This Encounter      Combination Diet Regular Diet Adult            Wound care and dressings       Instructions to care for your wound at home: keep wound clean and dry.  Daily gauze dressing changes as needed.                            Discharge Time:      Greater than 30 minutes.        Image Results From This Hospital Stay (For Non-EPIC Providers):        Results for orders placed or performed during the hospital encounter of 05/11/18   MR Hand Right w/o Contrast    Narrative    MR HAND RIGHT WITHOUT CONTRAST May 12, 2018 8:31 PM     HISTORY: Dog bite to hand. Rule out abscess, has ring metacarpal  fracture. "     TECHNIQUE: Coronal T1, fat-suppressed T1, fat-suppressed T2, and  inversion recovery, sagittal T1, and transverse T1 and fat-suppressed  T2-weighted pulse sequences are submitted. No contrast-enhanced images  are submitted.    COMPARISON: No radiographs are available for comparison.    FINDINGS: There is a nondisplaced or minimally displaced transverse  oblique fracture involving the fourth metacarpal proximal metaphysis.  No extension into the carpometacarpal joint is demonstrated.  Degenerative arthrosis with mild subchondral marrow edema is noted at  the first carpometacarpal joint. Scattered cysts or erosions are noted  within the carpal bones, not well evaluated with this scan. Along the  volar aspect of the distal ulna, there is an irregular shaped low  signal intensity object measuring 1.0 x 0.5 x 1.1 cm, possibly a  distal radioulnar joint or radiocarpal joint articular body versus a  foreign body, also not well seen on the edge of the scan. Adjacent  fluid is noted which may be related to a recess of the radiocarpal or  distal radioulnar joint. Extensive soft tissue edema is noted,  greatest along the dorsum of the hand, but also present within the  volar soft tissues. Complex extensor tendon sheath fluid is suggested.  This might be better delineated with contrast enhancement if  clinically warranted. However, there is no other evidence of discrete  soft tissue fluid collection or abscess. Visualized portions of the  flexor and extensor tendons appear to be grossly intact.      Impression    IMPRESSION:  1. Fluid collection along the volar aspect of the ulna possibly  associated with the distal radioulnar joint or radiocarpal joint.  2. 1.0 x 0.5 x 1.1 cm low signal intensity structure within the fluid  volar to the ulna, possibly a large articular body versus a foreign  body. Radiographic correlation is advised.  3. Extensive soft tissue edema in the hand, greatest dorsally but also  present in the  volar soft tissues. Extensor tendon sheath complex  fluid is suspected and might be better delineated with contrast  enhancement if clinically warranted. However, there is no other  definitive evidence of soft tissue abscess.  4. Fourth metacarpal proximal metaphyseal nondisplaced or minimally  displaced fracture.  5. Nonspecific cysts or erosions in multiple carpal bones, not well  evaluated with this scan.  6. First carpometacarpal joint degenerative arthrosis.    ADAMS KOENIG MD     No results found for this or any previous visit (from the past 4320 hour(s)).        Most Recent Lab Results In EPIC (For Non-EPIC Providers):    Most Recent 3 CBC's:  Recent Labs   Lab Test  05/12/18   0832  05/11/18   1220   WBC  9.2  12.1*   HGB  11.0*  12.5   MCV  93  92   PLT  253  292      Most Recent 3 BMP's:  Recent Labs   Lab Test  05/13/18   2330  05/12/18   0832  05/11/18   1220   NA   --   138  138   POTASSIUM   --   3.7  3.6   CHLORIDE   --   108  104   CO2   --   21  27   BUN   --   12  12   CR  0.67  0.75  0.81   ANIONGAP   --   9  7   JAYE   --   8.1*  9.6   GLC   --   145*  100*     Most Recent 3 Troponin's:No lab results found.    Invalid input(s): TROP, TROPONINIES  Most Recent 3 INR's:No lab results found.  Most Recent 2 LFT's:No lab results found.  Most Recent Cholesterol Panel:No lab results found.  Most Recent 6 Bacteria Isolates From Any Culture (See EPIC Reports for Culture Details):  Recent Labs   Lab Test  05/11/18   1254  05/11/18   1220   CULT  No growth after 3 days  No growth after 3 days     Most Recent TSH, T4 and HgbA1c: No lab results found.

## 2018-05-14 NOTE — PROGRESS NOTES
Care Transition Initial Assessment - RN  Met with: Patient and Family.  DATA   Active Problems:    Cellulitis     Cognitive Status: alert and oriented.   Contact information and PCP information verified: Yes  Lives With: alone  Living Arrangements: house      Insurance concerns: No Insurance issues identified  ASSESSMENT  Patient currently receives the following services:  Home therapies through [facility]        Identified issues/concerns regarding health management: Patient lives alone and noted to have increase difficulty with personal care and she is R handed. Patient's daughter stated she will not be able to assist patient  as she is allergic to cats and unable to be at patients home for greater than 20 mins. Patient had home care RN PT and OT through [facility] and writer able to call and confirm services.   Patient after much discussion realized she would not be able to return to home and wished to go to [facility]CU for rehab. SW confirmed placement and daughter was able to provide transportation      Patient anticipates needs for home equipment: Yes  Plan/Disposition: TCU   Care  (CTS) will continue to follow as needed.

## 2018-05-14 NOTE — PROGRESS NOTES
05/14/18 0818   Quick Adds   Type of Visit Initial Occupational Therapy Evaluation   Living Environment   Lives With alone   Living Arrangements house   Home Accessibility stairs to enter home;stairs within home   Number of Stairs to Enter Home 3   Number of Stairs Within Home 12   Transportation Available family or friend will provide   Self-Care   Dominant Hand right   Usual Activity Tolerance good   Current Activity Tolerance moderate   Regular Exercise yes   Activity/Exercise Type strength training   Equipment Currently Used at Home walker, rolling   Activity/Exercise/Self-Care Comment Has home PT 1x/wk   Functional Level Prior   Ambulation 1-->assistive equipment   Transferring 1-->assistive equipment   Toileting 1-->assistive equipment   Bathing 1-->assistive equipment   Dressing 0-->independent   Eating 0-->independent   Communication 0-->understands/communicates without difficulty   Swallowing 0-->swallows foods/liquids without difficulty   Cognition 0 - no cognition issues reported   Fall history within last six months no   Number of times patient has fallen within last six months 1   General Information   Onset of Illness/Injury or Date of Surgery - Date 05/11/18   Referring Physician Dr. Isaac Muñoz   Patient/Family Goals Statement return home   Additional Occupational Profile Info/Pertinent History of Current Problem 74 y/o female admitted after sustaining a R hand dog bite with cellulitis and R 4th metacarpal fracture.     Cognitive Status Examination   Cognitive Comment grossly intact   Pain Assessment   Patient Currently in Pain (R wrist/hand with movement only)   Range of Motion (ROM)   ROM Comment LUE, R gunner and elb WNL. Difficulty tolerating any PROM to 2nd thru 4th digits. R thumb AROM WFL.   Strength   Strength Comments LUE WNL, R gunner 4/5, elb 5/5 - hand not tested   Hand Strength   Hand Strength Comments L hand strength WNL   Coordination   Coordination Comments L WNL - unable to use R hand  "functionally   Mobility   Bed Mobility Comments See PT eval for mobility assessment   Upper Body Dressing   Level of Gem: Dress Upper Body moderate assist (50% patients effort)   Lower Body Dressing   Level of Gem: Dress Lower Body minimum assist (75% patients effort)   Toileting   Level of Gem: Toilet stand-by assist   Grooming   Level of Gem: Grooming stand-by assist   Physical Assist/Nonphysical Assist: Grooming set-up required   Eating/Self Feeding   Level of Gem: Eating independent   Physical Assist/Nonphysical Assist: Eating (L hand)   Instrumental Activities of Daily Living (IADL)   Previous Responsibilities meal prep;housekeeping;laundry;shopping;yardwork;medication management;finances;driving   Activities of Daily Living Analysis   Impairments Contributing to Impaired Activities of Daily Living pain;ROM decreased;strength decreased;coordination impaired  (dominant R hand)   General Therapy Interventions   Planned Therapy Interventions ADL retraining;IADL retraining;home program guidelines   Clinical Impression   Criteria for Skilled Therapeutic Interventions Met yes, treatment indicated   OT Diagnosis decreased ADL performance   Influenced by the following impairments dominant R hand laceration/fracture, pain, edema   Assessment of Occupational Performance 3-5 Performance Deficits   Identified Performance Deficits dressing, grooming, household mgmt, community mobility   Clinical Decision Making (Complexity) Low complexity   Predicted Duration of Therapy Intervention (days/wks) eval and one treatment only   Anticipated Discharge Disposition Home with Assist   Risks and Benefits of Treatment have been explained. Yes   Patient, Family & other staff in agreement with plan of care Yes   [facility] AM-PAC TM \"6 Clicks\"   2016, Trustees of [facility], under license to [employer].  All rights reserved.   6 Clicks Short Forms Daily Activity Inpatient Short " "Form   [facility] AM-PAC  \"6 Clicks\" Daily Activity Inpatient Short Form   1. Putting on and taking off regular lower body clothing? 4 - None   2. Bathing (including washing, rinsing, drying)? 3 - A Little   3. Toileting, which includes using toilet, bedpan or urinal? 4 - None   4. Putting on and taking off regular upper body clothing? 3 - A Little   5. Taking care of personal grooming such as brushing teeth? 4 - None   6. Eating meals? 4 - None   Daily Activity Raw Score (Score out of 24.Lower scores equate to lower levels of function) 22   Total Evaluation Time   Total Evaluation Time (Minutes) 15     "

## 2018-05-14 NOTE — PROGRESS NOTES
[facility]  Hospitalist Progress Note  Esau Greene MD    Assessment & Plan   Mariposa Mendez is a 73 year old female with PMHx significant for depression, osteoporosis, and hypothyroidism who presented for evaluation after a dog bite.     Dog bite with right hand cellulitis,  Right 4th metacarpal fracture:  Patient was bitten by her neighbor's dog over multiple spots involving her right hand on 05/10.  She was seen at a local urgent care clinic on 05/10 and again on 05/11 where her wounds were cleansed, a dorsal wrist laceration was sutured and Dermabond was applied to the remainder of her wounds. X-ray also showed minimally displaced oblique fracture involving the fourth metacarpal proximally without definitive intra-articular extension. WBC 12.1 on admission, then normalized. Patient was given tetanus shot in the urgent care on 05/10. She was started on IV ceftriaxone and metronidazole in ED on 05/11 and admitted for further management. She was seen by ortho and MRI obtained 05/13 which demonstrated fluid collection along the volar aspect of the ulna possibly associated with the distal radioulnar joint or radiocarpal joint. It also showed 1.0 x 0.5 x 1.1 cm low signal intensity structure within the fluid volar to the ulna, possibly a large articular body versus a foreign body. There was also extensive soft tissue edema in the hand, suspected extensor tendon sheath complex fluid, and fourth metacarpal proximal metaphyseal nondisplaced or minimally displaced fracture.    Blood cultures remain negative. Pain is controlled.      Recent Labs  Lab 05/12/18  0832 05/11/18  1220   WBC 9.2 12.1*       -D/C IV ceftriaxone and metronidazole and start Augmentin for 10 days.  -Cont splint  -PT assessment done and suggested home PT.  -Pain control with prn oxycodone, Tylenol and IV morphine  -Ortho is following and appreciate assist with management. Plan for follow up  On 05/16.    History of  depression  Chronic and stable on PTA bupropion and fluoxetine     Hypothyroidism  Chronic and stable on PTA Synthroid      # Pain Assessment:  Current Pain Score 5/14/2018   Patient currently in pain? -   Pain score (0-10) 4   Pain location -   Pain descriptors -   - Mariposa is experiencing pain due to dog bite and cellulitis. Pain management was discussed and the plan was created in a collaborative fashion.  Mariposa's response to the current recommendations: engaged  - Opioid regimen:  IV morphine, oxycodone  - Response to opioid medications: Reduction of symptoms   - Bowel regimen: miralax and docusate      Active Diet Order      Combination Diet Regular Diet Adult     DVT Prophylaxis: Enoxaparin (Lovenox) SQ    Code Status: Full Code     Disposition: Home today with home RN and OT.    D/W RN and care coordinator.    Interval History   Pain is controlled with prn acetaminophen. She denies fever. Chills, or other complaints. No new issue overnight.    Data reviewed today: I reviewed all new labs and imaging over the last 24 hours.    Physical Exam   Temp: 98.2  F (36.8  C) Temp src: Oral BP: 131/85   Heart Rate: 75 Resp: 16 SpO2: 96 % O2 Device: None (Room air)    Vitals:    05/11/18 1200 05/11/18 1529   Weight: 49.9 kg (110 lb) 49.9 kg (110 lb)     Vital Signs with Ranges  Temp:  [97.6  F (36.4  C)-98.4  F (36.9  C)] 98.2  F (36.8  C)  Heart Rate:  [67-79] 75  Resp:  [16-18] 16  BP: (119-131)/(69-85) 131/85  SpO2:  [93 %-96 %] 96 %  I/O's Last 24 hours  I/O last 3 completed shifts:  In: 1790 [P.O.:570; I.V.:1220]  Out: -     Constitutional: Comfortable, no acute distress  HEENT: No conjunctival pallor.  Neurologic: Awake, alert, fully oriented  Extremities: No calf tenderness, much decreased right hand swelling  Skin/integument: Right hand wounds are dressed    Medications   All medications were reviewed.    sodium chloride 75 mL/hr at 05/14/18 0414       aspirin  81 mg Oral Daily     buPROPion (WELLBUTRIN) tablet 150  mg  150 mg Oral QAM     cefTRIAXone  2 g Intravenous Q24H     cholecalciferol  1,000 Units Oral Daily     enoxaparin  40 mg Subcutaneous Q24H     FLUoxetine (PROzac) capsule 40 mg  40 mg Oral QAM     levothyroxine (SYNTHROID/LEVOTHROID) tablet 88 mcg  88 mcg Oral Daily     metroNIDAZOLE  500 mg Intravenous Q8H     traZODone (DESYREL) tablet 150 mg  150 mg Oral At Bedtime     vitamin E  400 Units Oral Daily        Data     Recent Labs  Lab 05/13/18  2330 05/12/18  0832 05/11/18  1220   WBC  --  9.2 12.1*   HGB  --  11.0* 12.5   MCV  --  93 92   PLT  --  253 292   NA  --  138 138   POTASSIUM  --  3.7 3.6   CHLORIDE  --  108 104   CO2  --  21 27   BUN  --  12 12   CR 0.67 0.75 0.81   ANIONGAP  --  9 7   JAYE  --  8.1* 9.6   GLC  --  145* 100*       No results found for this or any previous visit (from the past 24 hour(s)).

## 2018-05-14 NOTE — PLAN OF CARE
Problem: Patient Care Overview  Goal: Plan of Care/Patient Progress Review  Outcome: No Change  AAOx4; forgetful at times. VSS. Up w/ 1-assist and walker. No c/o pain overnight. R hand dressing- CDI. NSS 75 mL/hr. Continue w/ IV ABX and PT for R hand. Continue to monitor.

## 2018-05-14 NOTE — PROGRESS NOTES
SW:  D: TCU was arranged.  [facility] did assess and was able to accept patient under Medicare benefit.  Patient's daughter transported.    PAS-RR    D: Per DHS regulation, SW completed and submitted PAS-RR to [place] [employer] Direct Connect via the Senior LinkAge Line.  PAS-RR confirmation # is : [id number]    I: SW spoke with patient and they are aware a PAS-RR has been submitted.  JAYDA reviewed with patient that they may be contacted for a follow up appointment within 10 days of hospital discharge if their SNF stay is < 30 days.  Contact information for [facility] LinkAge Line was also provided.    A: She verbalized understanding.    P: Further questions may be directed to [facility] LinkAge Line at #[phone], option #4 for PAS-RR staff.

## 2018-05-14 NOTE — PLAN OF CARE
Problem: Patient Care Overview  Goal: Plan of Care/Patient Progress Review  OT eval and treatment completed on 72yo female admitted with dog bites on R hand/wrist resulting in laceration (now sutured), puncture wounds on both volar and dorsal surfaces as well as 4th metacarpal fracture. Pt lives alone, is indep all ADL, IADL.  Discharge Planner OT   Patient plan for discharge: home   Current status: Completed training in edema management and HEP per Dr. Muñoz orders for R wrist, digit ROM. Pt tolerating very minimal R wrist or 2nd-5th digit ROM due to pain, R thumb WFL. Instructed in option of SROM using L hand and doing in gravity eliminated plane, increasing in small increments as tolerated and pt demonstrated understanding. Also completed training in modified strategies for daily tasks as pt unable to use dominant R hand functionally as an assist at current time and pt verbalized understanding. No further inpatient OT planned at this time and discharged.  Barriers to return to prior living situation: lives alone, no physical barriers  Recommendations for discharge: home with Home OT  Rationale for recommendations: daughter able to provide assist with transportation and can come over to help with any tasks as needed. Pt will benefit from home OT for continued training with HEP and modified ADLs as needed.        Entered by: Sid Echavarria 05/14/2018 10:58 AM

## 2018-05-14 NOTE — PLAN OF CARE
Problem: Patient Care Overview  Goal: Plan of Care/Patient Progress Review  Outcome: Improving  Pt A&O x 4, forgetful at times. VSS on RA. Pt reports pain in R hand, PRN Tylenol administered x2 with ice packs, pt reports relief. R hand swollen, red and warm to the touch. +2 edema RUE. Dressing changed per plan of care. Continue IV abx, IVF @ 75ml/hr. F/U Ortho, PT. Will continue to monitor.

## 2018-05-15 ENCOUNTER — NURSING HOME VISIT (OUTPATIENT)
Dept: GERIATRICS | Facility: CLINIC | Age: 73
End: 2018-05-15
Payer: MEDICARE

## 2018-05-15 DIAGNOSIS — R53.81 PHYSICAL DECONDITIONING: ICD-10-CM

## 2018-05-15 DIAGNOSIS — S62.304D CLOSED NONDISPLACED FRACTURE OF FOURTH METACARPAL BONE OF RIGHT HAND WITH ROUTINE HEALING, UNSPECIFIED PORTION OF METACARPAL, SUBSEQUENT ENCOUNTER: ICD-10-CM

## 2018-05-15 DIAGNOSIS — L03.113 CELLULITIS OF HAND, RIGHT: Primary | ICD-10-CM

## 2018-05-15 DIAGNOSIS — F32.A DEPRESSION, UNSPECIFIED DEPRESSION TYPE: ICD-10-CM

## 2018-05-15 PROCEDURE — 99310 SBSQ NF CARE HIGH MDM 45: CPT | Performed by: NURSE PRACTITIONER

## 2018-05-15 NOTE — PROGRESS NOTES
"[facility]  PRIMARY CARE PROVIDER AND CLINIC:  Patria Shea [place] [id number] / [facility] [place] [address]  Chief Complaint   Patient presents with     Hospital F/U     [place] Medical Record Number:  [id number]    HPI:    Mariposa Mendez is a 73 year old  (1945), PMH of Depression, hypothyroidism, osteoporosis who presents to ED for evaluation of dog bite admitted to the [facility] from [facility].  Hospital stay 5/11/18 through 5/14/18.   Right hand cellulitis/dog bite, right 4th metacarpal Fx: minimally displaced oblique Fx 4th metacarpal, patient given a tetanus shot, started on IV ceftriaxone and metronidazole, responded well to IV therapy transitioned to oral augmentin.    Admitted to this facility for  rehab, medical management and nursing care.  HPI information obtained from: facility chart records, facility staff, patient report and [facility] chart review.  Current issues are:    On exam today patient is alert, pleasant, states right hand pain is \"nagging\" but pain decreasing, patient states tylenol is effective for pain management, denies fever, chills, cough, congestion, SOB, N/V/D or constipation. No other concerns noted on exam today.     Last 3 BPs: 118/65, 115/64, 127/71 mmHg  HR Ranges: 73 bpm  Admission Weight: 113.1 lbs    CODE STATUS/ADVANCE DIRECTIVES DISCUSSION:   CPR/Full code   Patient's living condition: lives alone    ALLERGIES:Review of patient's allergies indicates no known allergies.  PAST MEDICAL HISTORY:  has a past medical history of Depressive disorder, not elsewhere classified; Generalized osteoarthrosis, unspecified site; OSTEOPENIA OF HIP; Other and unspecified hyperlipidemia; and Unspecified hypothyroidism.  PAST SURGICAL HISTORY:  has a past surgical history that includes surgical history of -  (age 39 or 40) and surgical history of -  (12-8-2005).  FAMILY HISTORY: family history includes Arthritis in her maternal " grandfather and mother; Breast Cancer in her mother; Family History Negative in her daughter; Hypertension in her mother; Lipids in her mother, sister, and sister; Psychotic Disorder in her brother, mother, sister, and sister; Thyroid Disease in her sister.  SOCIAL HISTORY:  reports that she has been smoking Cigarettes.  She has a 15.00 pack-year smoking history. She has never used smokeless tobacco. She reports that she drinks alcohol.    Post Discharge Medication Reconciliation Status: discharge medications reconciled, continue medications without change.  Current Outpatient Prescriptions   Medication Sig Dispense Refill     acetaminophen (TYLENOL) 325 MG tablet Take 2 tablets (650 mg) by mouth every 4 hours as needed for mild pain 50 tablet 0     amoxicillin-clavulanate (AUGMENTIN) 875-125 MG per tablet Take 1 tablet by mouth every 12 hours for 10 days 20 tablet 0     ASPIRIN 81 MG OR TABS 1 tab po QD (Once per day) 100 3     BUPROPION HCL PO Take 150 mg by mouth every morning (Takes 2 x 75mg immediate release tablets for a total of 150mg in the morning- this dose [facility])       FLUOXETINE HCL PO Take 40 mg by mouth every morning (Takes 2 x 20mg for a total of 40mg daily)       LEVOTHYROXINE SODIUM PO Take 88 mcg by mouth daily       Multiple Vitamins-Minerals (EMERGEN-C VITAMIN C) PACK Take 1 packet by mouth daily       order for DME Equipment being ordered: [employer] ()  Treatment Diagnosis: Difficulty with gait due to LE weakness and unable to hold walker with right hand 1 each 0     TRAZODONE HCL PO Take 150 mg by mouth At Bedtime (Takes 1.5 x 100mg tablet for a total of 150mg at bedtime)       VITAMIN D, CHOLECALCIFEROL, PO Take 1 tablet by mouth daily (OTC: Pt unsure of strength)       VITAMIN E NATURAL PO Take 1 tablet by mouth daily (OTC: Pt unsure of strength)         ROS:  10 point ROS of systems including Constitutional, Eyes, Respiratory,  Cardiovascular, Gastroenterology, Genitourinary, Integumentary, Muscularskeletal, Psychiatric were all negative except for pertinent positives noted in my HPI.    Exam:  /65  Pulse 73  Temp 98.2  F (36.8  C)  Resp 18  Wt 113 lb 1.6 oz (51.3 kg)  SpO2 97%  BMI 19.41 kg/m2  GENERAL APPEARANCE:  Alert, in no distress  ENT:  Mouth and posterior oropharynx normal, moist mucous membranes, normal hearing acuity  EYES:  EOM, conjunctivae, lids, pupils and irises normal, PERRL  RESP:  respiratory effort and palpation of chest normal, lungs clear to auscultation , no respiratory distress  CV:  Palpation and auscultation of heart done , regular rate and rhythm, no murmur, rub, or gallop, no edema  ABDOMEN:  normal bowel sounds, soft, nontender, no hepatosplenomegaly or other masses  M/S:   Examination of:   left upper extremity, right lower extremity and left lower extremity  Inspection, ROM, stability and muscle strength normal and decreased ROM and strength Right hand  SKIN:  Inspection of skin and subcutaneous tissue baseline, right hand and wrist with several small bite marks to hand with swellling and some diffuse redness/pink color  NEURO:   Cranial nerves 2-12 are normal tested and grossly at patient's baseline, speech WNL  PSYCH:  affect and mood normal    Lab/Diagnostic data:  CBC RESULTS:   Recent Labs   Lab Test  05/14/18   0835  05/12/18   0832   WBC  7.0  9.2   RBC  4.08  3.65*   HGB  12.4  11.0*   HCT  37.9  34.0*   MCV  93  93   MCH  30.4  30.1   MCHC  32.7  32.4   RDW  13.1  13.4   PLT  324  253       Last Basic Metabolic Panel:  Recent Labs   Lab Test  05/14/18   0835  05/13/18   2330  05/12/18   0832  05/11/18   1220   NA   --    --   138  138   POTASSIUM   --    --   3.7  3.6   CHLORIDE   --    --   108  104   JAYE   --    --   8.1*  9.6   CO2   --    --   21  27   BUN   --    --   12  12   CR  0.66  0.67  0.75  0.81   GLC   --    --   145*  100*       ASSESSMENT/PLAN:  Cellulitis of hand, right  from a dog bite  Closed nondisplaced fracture of fourth metacarpal bone of right hand with routine healing, unspecified portion of metacarpal, subsequent encounter  Physical deconditioning  Acute/ongoing: PT and OT for strengthening, augmentin 875/125mg BID, tylenol 650mg q 4 hours prn    Depression, unspecified depression type  Ongoing: no change in Rx        Orders:  CBC on Monday  Clarification of vitamin D and vitamin E    Total time with patient visit: 40 minutes including discussions about the POC and care coordination with the patient. Greater than 50% of total time spent with counseling and coordinating care due to review of chart and clarification of orders.          Electronically signed by:  Tonya Lynn Haase, APRN CNP

## 2018-05-17 LAB
BACTERIA SPEC CULT: NO GROWTH
BACTERIA SPEC CULT: NO GROWTH
Lab: NORMAL
Lab: NORMAL
SPECIMEN SOURCE: NORMAL
SPECIMEN SOURCE: NORMAL

## 2018-05-18 ENCOUNTER — TRANSFERRED RECORDS (OUTPATIENT)
Dept: HEALTH INFORMATION MANAGEMENT | Facility: CLINIC | Age: 73
End: 2018-05-18

## 2018-05-19 ENCOUNTER — NURSING HOME VISIT (OUTPATIENT)
Dept: GERIATRICS | Facility: CLINIC | Age: 73
End: 2018-05-19
Payer: MEDICARE

## 2018-05-19 DIAGNOSIS — S62.304D CLOSED NONDISPLACED FRACTURE OF FOURTH METACARPAL BONE OF RIGHT HAND WITH ROUTINE HEALING, UNSPECIFIED PORTION OF METACARPAL, SUBSEQUENT ENCOUNTER: ICD-10-CM

## 2018-05-19 DIAGNOSIS — L03.113 CELLULITIS OF HAND, RIGHT: Primary | ICD-10-CM

## 2018-05-19 PROCEDURE — 99305 1ST NF CARE MODERATE MDM 35: CPT | Performed by: INTERNAL MEDICINE

## 2018-05-20 NOTE — PROGRESS NOTES
[place] [facility]  PRIMARY CARE PROVIDER AND CLINIC:  Monse Patria [address] / [facility] [address]      Pt was seen by Dr Quintanilla on 5/19/18 for an initial TCU visit        HPI:    Mariposa Mendez is a 73 year old  (1945), PMH of Depression, hypothyroidism, who was hospitalized at [facility] from 5/11/18-5//14/18 for the management of a R hand dog bite with R 4th metacarpal fracture and cellulitis.  Pt was treated with Rocephin and metronidazole, which was transitioned to Augmentin     Admitted to this H for  rehab, medical management and nursing care.      Pt notes R hand remains painful and stiff, but improved  She denies fevers, chills, abd pain, diarrhea      CODE STATUS/ADVANCE DIRECTIVES DISCUSSION:   CPR/Full code   Patient's living condition: lives alone    ALLERGIES:Review of patient's allergies indicates no known allergies.  PAST MEDICAL HISTORY:  has a past medical history of Depressive disorder, not elsewhere classified; Generalized osteoarthrosis, unspecified site; OSTEOPENIA OF HIP; Other and unspecified hyperlipidemia; and Unspecified hypothyroidism.  PAST SURGICAL HISTORY:  has a past surgical history that includes surgical history of -  (age 39 or 40) and surgical history of -  (12-8-2005).  FAMILY HISTORY: family history includes Arthritis in her maternal grandfather and mother; Breast Cancer in her mother; Family History Negative in her daughter; Hypertension in her mother; Lipids in her mother, sister, and sister; Psychotic Disorder in her brother, mother, sister, and sister; Thyroid Disease in her sister.  SOCIAL HISTORY:  reports that she has been smoking Cigarettes.  She has a 15.00 pack-year smoking history. She has never used smokeless tobacco. She reports that she drinks alcohol.        Post Discharge Medication Reconciliation Status:   .  Current Outpatient Prescriptions   Medication Sig Dispense Refill     acetaminophen (TYLENOL) 325 MG tablet Take 2 tablets  (650 mg) by mouth every 4 hours as needed for mild pain 50 tablet 0     amoxicillin-clavulanate (AUGMENTIN) 875-125 MG per tablet Take 1 tablet by mouth every 12 hours for 10 days 20 tablet 0     ASPIRIN 81 MG OR TABS 1 tab po QD (Once per day) 100 3     BUPROPION HCL PO Take 150 mg by mouth every morning (Takes 2 x 75mg immediate release tablets for a total of 150mg in the morning- this dose was verified with patient's retail pharmacy - [email or web address]       FLUOXETINE HCL PO Take 40 mg by mouth every morning (Takes 2 x 20mg for a total of 40mg daily)       LEVOTHYROXINE SODIUM PO Take 88 mcg by mouth daily       Multiple Vitamins-Minerals (EMERGEN-C VITAMIN C) PACK Take 1 packet by mouth daily       order for DME Equipment being ordered: Walker Platform ()  Treatment Diagnosis: Difficulty with gait due to LE weakness and unable to hold walker with right hand 1 each 0     TRAZODONE HCL PO Take 150 mg by mouth At Bedtime (Takes 1.5 x 100mg tablet for a total of 150mg at bedtime)       VITAMIN D, CHOLECALCIFEROL, PO Take 1 tablet by mouth daily (OTC: Pt unsure of strength)       VITAMIN E NATURAL PO Take 1 tablet by mouth daily (OTC: Pt unsure of strength)         ROS:  10 point ROS neg except as noted above.    Exam:    GENERAL APPEARANCE:  Alert, in no distress, pleasant, ambulating in room  ENT:  Mouth and posterior oropharynx normal, moist mucous membranes, normal hearing acuity  EYES:  EOM, conjunctivae, lids, pupils and irises normal, PERRL  RESP:  Lungs clear  CV: RRR no M  ABDOMEN:  Soft, non-tender  M/S:  R hand with multiple healed or scabbed over bite marks. Sl edema back of R hand, no erythema. Able to flex all fingers, but limited throughout my pain.  NEURO:   Cranial nerves 2-12 are normal tested and grossly at patient's baseline, speech WNL  PSYCH:  affect and mood normal    Lab/Diagnostic data:  CBC RESULTS:   Recent Labs   Lab Test  05/14/18   0835  05/12/18   0832   WBC  7.0  9.2   RBC  4.08  3.65*    HGB  12.4  11.0*   HCT  37.9  34.0*   MCV  93  93   MCH  30.4  30.1   MCHC  32.7  32.4   RDW  13.1  13.4   PLT  324  253       Last Basic Metabolic Panel:  Recent Labs   Lab Test  05/14/18   0835  05/13/18   2330  05/12/18   0832  05/11/18   1220   NA   --    --   138  138   POTASSIUM   --    --   3.7  3.6   CHLORIDE   --    --   108  104   JAYE   --    --   8.1*  9.6   CO2   --    --   21  27   BUN   --    --   12  12   CR  0.66  0.67  0.75  0.81   GLC   --    --   145*  100*       ASSESSMENT/PLAN:    Cellulitis of hand, right from a dog bite  Closed nondisplaced fracture of fourth metacarpal bone of right hand with routine healing, unspecified portion of metacarpal, subsequent encounter  Physical deconditioning  Acute/ongoing: PT and OT for strengthening, augmentin 875/125mg BID for 10 days total,  tylenol 650mg q 4 hours prn  Monitor hand exam    Depression, unspecified depression type  stable       Rodriguez Quintanilla MD

## 2018-05-21 ENCOUNTER — TRANSFERRED RECORDS (OUTPATIENT)
Dept: HEALTH INFORMATION MANAGEMENT | Facility: CLINIC | Age: 73
End: 2018-05-21

## 2018-05-21 LAB
DIFFERENTIAL: NORMAL
ERYTHROCYTE [DISTWIDTH] IN BLOOD BY AUTOMATED COUNT: 13.9 % (ref 11–15)
HCT VFR BLD AUTO: 39.6 % (ref 35–46)
HEMOGLOBIN: 12.3 G/DL (ref 11.8–15.5)
MCV RBC AUTO: 97.1 FL (ref 80–100)
PLATELET # BLD AUTO: 406 K/CMM (ref 140–450)
RBC # BLD AUTO: 4.08 M/CMM (ref 3.7–5.2)
WBC # BLD AUTO: 5.7 K/CMM (ref 3.8–11)

## 2018-05-22 VITALS
OXYGEN SATURATION: 98 % | RESPIRATION RATE: 16 BRPM | SYSTOLIC BLOOD PRESSURE: 117 MMHG | TEMPERATURE: 97.2 F | BODY MASS INDEX: 19.04 KG/M2 | DIASTOLIC BLOOD PRESSURE: 77 MMHG | HEART RATE: 91 BPM | WEIGHT: 110.9 LBS

## 2018-05-22 NOTE — PROGRESS NOTES
[place] [facility] DISCHARGE SUMMARY    PATIENT'S NAME: Mariposa Mendez  YOB: 1945  MEDICAL RECORD NUMBER:  [id number]    PRIMARY CARE PROVIDER AND CLINIC RESPONSIBLE AFTER TRANSFER: Patria Shea [address] / [facility] [address]     CODE STATUS/ADVANCE DIRECTIVES DISCUSSION:   CPR/Full code      No Known Allergies    TRANSFERRING PROVIDERS: Dr. Socorro Araiza MD  DATE OF SNF ADMISSION:  May / 14 / 2018  DATE OF SNF (anticipated) DISCHARGE: May / 28 / 2018  DISCHARGE DISPOSITION: Non-[facility]   Nursing Facility: [facility] stay 5/11/18 to 5/14/18.     Condition on Discharge:  Stable.  Function:  Walking up to 350 feet using a RW with supervision due to unsafe movements  Cognitive Scores: BIMS: 13/15, SBT: 18/28    Equipment: RPRW    DISCHARGE DIAGNOSIS:   1. Cellulitis of hand, right    2. Closed nondisplaced fracture of fourth metacarpal bone of right hand with routine healing, unspecified portion of metacarpal, subsequent encounter    3. Physical deconditioning    4. Depression, unspecified depression type          PAST MEDICAL HISTORY:  has a past medical history of Depressive disorder, not elsewhere classified; Generalized osteoarthrosis, unspecified site; OSTEOPENIA OF HIP; Other and unspecified hyperlipidemia; and Unspecified hypothyroidism.    DISCHARGE MEDICATIONS:  Current Outpatient Prescriptions   Medication Sig Dispense Refill     acetaminophen (TYLENOL) 325 MG tablet Take 2 tablets (650 mg) by mouth every 4 hours as needed for mild pain 50 tablet 0     amoxicillin-clavulanate (AUGMENTIN) 875-125 MG per tablet Take 1 tablet by mouth every 12 hours for 10 days 20 tablet 0     ASPIRIN 81 MG OR TABS 1 tab po QD (Once per day) 100 3     BUPROPION HCL PO Take 150 mg by mouth every morning (Takes 2 x 75mg immediate release tablets for a total of 150mg in the morning- this dose was verified with patient's retail  pharmacy - [facility])       FLUOXETINE HCL PO Take 40 mg by mouth every morning (Takes 2 x 20mg for a total of 40mg daily)       LEVOTHYROXINE SODIUM PO Take 88 mcg by mouth daily       Multiple Vitamins-Minerals (EMERGEN-C VITAMIN C) PACK Take 1 packet by mouth daily       order for DME Equipment being ordered: Walker Platform ()  Treatment Diagnosis: Difficulty with gait due to LE weakness and unable to hold walker with right hand 1 each 0     TRAZODONE HCL PO Take 150 mg by mouth At Bedtime (Takes 1.5 x 100mg tablet for a total of 150mg at bedtime)       VITAMIN D, CHOLECALCIFEROL, PO Take 1,000 Units by mouth daily        VITAMIN E NATURAL PO Take 400 Units by mouth daily          MEDICATION CHANGES/RATIONALE:   Patient had a F/u appt with ortho on 5/18/18 volar splint applied and recommend OP hand therapy, patient still unable to make a fist with right hand, unable to pick a cup up with right hand, no strength.   No medication changes made during TCU stay  Last 3 BPs: 117/77, 125/76, 117/58 mmHg  HR Ranges: 81-91 bpm  Admission Weight: 113.1 lbs  Current Weight: 110.9 lbs  Controlled medications sent with patient:   not applicable/none     ROS:    10 point ROS of systems including Constitutional, Eyes, Respiratory, Cardiovascular, Gastroenterology, Genitourinary, Integumentary, Muscularskeletal, Psychiatric were all negative except for pertinent positives noted in my HPI.    Physical Exam:   Vitals: /77  Pulse 91  Temp 97.2  F (36.2  C)  Resp 16  Wt 110 lb 14.4 oz (50.3 kg)  SpO2 98%  BMI 19.04 kg/m2  BMI= Body mass index is 19.04 kg/(m^2).  GENERAL APPEARANCE:  Alert, in no distress  ENT:  Mouth and posterior oropharynx normal, moist mucous membranes, normal hearing acuity  EYES:  EOM, conjunctivae, lids, pupils and irises normal, PERRL  RESP:  respiratory effort and palpation of chest normal, lungs clear to auscultation , no respiratory distress  CV:  Palpation and auscultation of heart done  , regular rate and rhythm, no murmur, rub, or gallop, no edema  ABDOMEN:  normal bowel sounds, soft, nontender, no hepatosplenomegaly or other masses  M/S:   Examination of:   left upper extremity, right lower extremity and left lower extremity  Inspection, ROM, stability and muscle strength normal and decreased ROM and strength Right hand  SKIN:  Inspection of skin and subcutaneous tissue baseline, right hand and wrist with several small bite marks which are scabbed over, swelling is much improved, patient is still unable to make a fist with right hand, cannot pick a cup up no strength.   NEURO:   Cranial nerves 2-12 are normal tested and grossly at patient's baseline, speech WNL  PSYCH:  affect and mood normal    HPI Nursing Facility Course: Patient progressed in therapy to walking up to 350 feet with a platform walker with SBA, therapy feel SBA needed due to patient impulsivity and unsafe movements. Patient states she wants to return home and will have OP hand therapy per ortho recommendations.   HPI information obtained from: facility chart records, facility staff, patient report and [facility] chart review.      Cellulitis of hand, right from a dog bite  Closed nondisplaced fracture of fourth metacarpal bone of right hand with routine healing, unspecified portion of metacarpal, subsequent encounter  Physical deconditioning  Acute/ongoing: DC home with OP hand therapy, finished coarse of augmentin,   F/u with ortho in 3 weeks   tylenol 650mg q 4 hours prn     Depression, unspecified depression type  Ongoing: no change in Rx          DISCHARGE PLAN:  OP OT, hand therapy  Patient instructed to follow-up with:  PCP in 7 days      Current [place] scheduled appointments:  Future Appointments  No future appointments.      MTM referral needed and placed by this provider: No    Pending labs: none  SNF labs   CBC RESULTS:   Recent Labs   Lab Test 05/21/18 05/14/18   0835  05/12/18   0832   WBC  5.7  7.0  9.2   RBC   4.08  4.08  3.65*   HGB  12.3  12.4  11.0*   HCT  39.6  37.9  34.0*   MCV  97.1  93  93   MCH   --   30.4  30.1   MCHC   --   32.7  32.4   RDW  13.9  13.1  13.4   PLT  406  324  253       Last Basic Metabolic Panel:  Recent Labs   Lab Test  05/14/18   0835  05/13/18   2330  05/12/18   0832  05/11/18   1220   NA   --    --   138  138   POTASSIUM   --    --   3.7  3.6   CHLORIDE   --    --   108  104   JAYE   --    --   8.1*  9.6   CO2   --    --   21  27   BUN   --    --   12  12   CR  0.66  0.67  0.75  0.81   GLC   --    --   145*  100*         Discharge Treatments:none    TOTAL DISCHARGE TIME:   Greater than 30 minutes  Electronically signed by:  Farhat Ling

## 2018-05-23 ENCOUNTER — NURSING HOME VISIT (OUTPATIENT)
Dept: GERIATRICS | Facility: CLINIC | Age: 73
End: 2018-05-23
Payer: MEDICARE

## 2018-05-23 DIAGNOSIS — F32.A DEPRESSION, UNSPECIFIED DEPRESSION TYPE: ICD-10-CM

## 2018-05-23 DIAGNOSIS — R53.81 PHYSICAL DECONDITIONING: ICD-10-CM

## 2018-05-23 DIAGNOSIS — L03.113 CELLULITIS OF HAND, RIGHT: Primary | ICD-10-CM

## 2018-05-23 DIAGNOSIS — S62.304D CLOSED NONDISPLACED FRACTURE OF FOURTH METACARPAL BONE OF RIGHT HAND WITH ROUTINE HEALING, UNSPECIFIED PORTION OF METACARPAL, SUBSEQUENT ENCOUNTER: ICD-10-CM

## 2018-05-23 PROCEDURE — 99316 NF DSCHRG MGMT 30 MIN+: CPT | Performed by: NURSE PRACTITIONER

## 2018-06-08 ENCOUNTER — TRANSFERRED RECORDS (OUTPATIENT)
Dept: HEALTH INFORMATION MANAGEMENT | Facility: CLINIC | Age: 73
End: 2018-06-08

## 2018-08-29 ENCOUNTER — HOSPITAL ENCOUNTER (EMERGENCY)
Facility: CLINIC | Age: 73
Discharge: HOME | End: 2018-08-29
Attending: EMERGENCY MEDICINE | Admitting: EMERGENCY MEDICINE
Payer: MEDICARE

## 2018-08-29 VITALS
DIASTOLIC BLOOD PRESSURE: 80 MMHG | TEMPERATURE: 97 F | HEART RATE: 81 BPM | SYSTOLIC BLOOD PRESSURE: 129 MMHG | OXYGEN SATURATION: 100 % | RESPIRATION RATE: 14 BRPM

## 2018-08-29 DIAGNOSIS — S00.81XA FACIAL ABRASION, INITIAL ENCOUNTER: ICD-10-CM

## 2018-08-29 DIAGNOSIS — S01.511A LIP LACERATION, INITIAL ENCOUNTER: ICD-10-CM

## 2018-08-29 PROCEDURE — 12011 RPR F/E/E/N/L/M 2.5 CM/<: CPT

## 2018-08-29 PROCEDURE — 99283 EMERGENCY DEPT VISIT LOW MDM: CPT

## 2018-08-29 PROCEDURE — 25000125 ZZHC RX 250

## 2018-08-29 RX ADMIN — LIDOCAINE HYDROCHLORIDE: 20 SOLUTION ORAL; TOPICAL at 16:30

## 2018-08-29 ASSESSMENT — ENCOUNTER SYMPTOMS
BACK PAIN: 0
HEADACHES: 0
WOUND: 1
LIGHT-HEADEDNESS: 0
DIZZINESS: 0
NECK PAIN: 0

## 2018-08-29 NOTE — ED PROVIDER NOTES
History     Chief Complaint:  Fall     HPI   Maripsoa Mendez is a 73 year old female, on baby Aspirin daily, who presents to the ED for evaluation of mechanical fall. The patient reports she was walking on a sidewalk today when she tripped and face planted but did not hit her forehead. It happened too quickly; therefore, she was unable to catch herself. She was able to ambulate back into the grocery store afterwards. The patient notes she currently feels fine. She has upper lip pain with a laceration and a right knee abrasion. The patient denies any other blood thinners, loss of consciousness, lightheadedness, dizziness, headache, epistaxis, dental problem, neck pain, chest pain, back pain, or other injuries. Of note, the patient's last tetanus immunization was on 5/10/18.     Allergies:  No known drug allergies    Medications:    Levothyroxine   Multivitamin-minerals   Trazodone  Vitamin D  Vitamin E  Aspirin 81mg  Bupropion  Fluoxetine    Past Medical History:    Depression  Osteoarthrosis   Osteopenia   HLD  Hypothyroidism    Past Surgical History:    SUSANNAH w/ unilateral oophorectomy    Right foot surgery w/ hardware     Family History:    Breast cancer  Osteoarthritis  Lipids  HTN  Thyroid disease  Depression     Social History:  Smoking status: Current every day smoker, 0.50ppd  Alcohol use: Yes  Presents to ED alone    Marital Status:   [5]     Review of Systems   HENT: Negative for dental problem and nosebleeds.    Cardiovascular: Negative for chest pain.   Musculoskeletal: Negative for back pain, gait problem and neck pain.   Skin: Positive for wound.   Neurological: Negative for dizziness, syncope, light-headedness and headaches.   All other systems reviewed and are negative.    Physical Exam     Patient Vitals for the past 24 hrs:   BP Temp Temp src Pulse Resp SpO2   08/29/18 1552 129/80 97  F (36.1  C) Temporal 81 14 100 %     Physical Exam    Physical Exam   Constitutional:  Patient is oriented to  person, place, and time. They appear well-developed and well-nourished. Mild distress secondary to fall   HENT:    Patient has no dental injury. No malocclusion. She has a quarter size superficial abrasion just above the right lip with small puncture hole. This does not go through to the upper inner lip. She has a small superficial w-shaped laceration to the upper lip. No foreign bodies. No avulsion. Small abrasion on the chin.   Mouth/Throat:   Oropharynx is clear and moist.   Eyes:    Conjunctivae normal and EOM are normal. Pupils are equal, round, and reactive to light.   Neck:    No focal vertebral tenderness to palpation. Normal range of motion.   Cardiovascular: Normal rate, regular rhythm and normal heart sounds.  Exam reveals no gallop and no friction rub.  No murmur heard.  Pulmonary/Chest:  Effort normal and breath sounds normal. Patient has no wheezes. Patient has no rales.   Abdominal:   Soft. Bowel sounds are normal. Patient exhibits no mass. There is no tenderness. There is no rebound and no guarding.   Musculoskeletal:  Small abrasion to the right knee. Normal flexion and extension. No ligament laxity. Normal range of motion. Patient exhibits no edema.   Neurological:   Patient is alert and oriented to person, place, and time. Patient has normal strength. No cranial nerve deficit or sensory deficit. GCS 15  Skin:   Skin is warm and dry. No rash noted. No erythema.   Psychiatric:   Patient has a normal mood and affect. Patient's behavior is normal. Judgment and thought content normal.     Emergency Department Course     Procedures:     Laceration Repair        LACERATION:  A simple and superficial clean quarter size laceration.      LOCATION:  Upper lip      FUNCTION:  Distally sensation are intact.      ANESTHESIA:  Local using 1% Lidocaine total of 0.5cc      PREPARATION:  Irrigation and Scrubbing with Normal Saline and Shur Clens      DEBRIDEMENT:  no debridement      CLOSURE: Skin closed with 1 x  5.0 Vicryl Sutures using interrupted suture in the puncture wound above the lip. Skin closed with 2 x 5.0 Vicryl Sutures using interrupted sutures in the upper lip.     Emergency Department Course:  Patient arrived by EMS.     Past medical records, nursing notes, and vitals reviewed.  1622: I performed an exam of the patient and obtained history, as documented above.    1713: I performed the laceration repair as noted above.    Findings and plan explained to the Patient. Patient discharged home with instructions regarding supportive care, medications, and reasons to return. The importance of close follow-up was reviewed.     Impression & Plan      Medical Decision Making:  Mariposa Mendez is a 73 year old female presenting after mechanical fall. She did not lose consciousness. Had no neck or back pain. Really did not have any significant extremity injury. She had a small abrasion to the right knee but no concerning findings for fracture. She had full range of movement. She did have a facial abrasion with a laceration to the upper lip. This was explored, and I felt that this did require some sutures. Please see procedure note. The wound edge is approximated very well, and I did not cross the vermilion border. She is up-to-date on her tetanus. Wound care was discussed. Signs and symptoms to watch for regarding infection were also discussed. She'll follow-up with her primary care doctor as needed.     Diagnosis:    ICD-10-CM   1. Lip laceration, initial encounter S01.511A   2. Facial abrasion, initial encounter S00.81XA     Disposition: Patient discharged to home     Farnaz Hinton  8/29/2018    EMERGENCY DEPARTMENT    Scribe Disclosure:  I, Farnaz Hinton, am serving as a scribe at 4:22 PM on 8/29/2018 to document services personally performed by Farnaz Allison MD based on my observations and the provider's statements to me.        Farnaz Allison MD  08/29/18 7003

## 2018-08-29 NOTE — ED AVS SNAPSHOT
Emergency Department    [address] [place] [address]    Phone:  [phone]    Fax:  [phone]                                       Mariposa Mendez   MRN: [id number]    Department:   Emergency Department   Date of Visit:  8/29/2018           After Visit Summary Signature Page     I have received my discharge instructions, and my questions have been answered. I have discussed any challenges I see with this plan with the nurse or doctor.    ..........................................................................................................................................  Patient/Patient Representative Signature      ..........................................................................................................................................  Patient Representative Print Name and Relationship to Patient    ..................................................               ................................................  Date                                            Time    ..........................................................................................................................................  Reviewed by Signature/Title    ...................................................              ..............................................  Date                                                            Time          22EPIC Rev 08/18

## 2018-08-29 NOTE — DISCHARGE INSTRUCTIONS
* LACERATION (All Closures)  A laceration is a cut through the skin. This will usually require stitches (sutures) or staples if it is deep. Minor cuts may be treated with a tape closure ( Steri-Strips ) or Dermabond skin glue.       HOME CARE:  PAIN MEDICINE: You may use acetaminophen (Tylenol) 650-1000 mg every 6 hours or ibuprofen (Motrin, Advil) 600 mg every 6-8 hours with food to control pain, if you are able to take these medicines. [NOTE: If you have chronic liver or kidney disease or ever had a stomach ulcer or GI bleeding, talk with your doctor before using these medicines.]  EXTREMITY, FACE or TRUNK WOUNDS:    Keep the wound clean and dry. If a bandage was applied and it becomes wet or dirty, replace it. Otherwise, leave it in place for the first 24 hours.    If stitches or staples were used, clean the wound daily. Protect the wound from sunlight and tanning lamps.    After removing the bandage, wash the area with soap and water. Use a wet cotton swab (Q tip) to loosen and remove any blood or crust that forms.    After cleaning, apply a thin layer of Polysporin or Bacitracin ointment. This will keep the wound clean and make it easier to remove the stitches or staples. Reapply a fresh bandage.    You may remove the bandage to shower as usual after the first 24 hours, but do not soak the area in water (no swimming) until the stitches or staples are removed.    If Steri-Strips were used, keep the area clean and dry. If it becomes wet, blot it dry with a towel. It is okay to take a brief shower, but avoid scrubbing the area.    If Dermabond skin adhesive was used, do not scratch, rub or pick at the adhesive film. Do not place tape directly over the film. Do not apply liquid, ointment or creams to the wound while the film is in place. Do not clean the wound with peroxide and do not apply ointments. Avoid activities that cause heavy sweating until the film has fallen off. Protect the wound from prolonged  exposure to sunlight or tanning lamps. You may shower as usual but do not soak the wound in water (no baths or swimming). The film will fall off by itself in 5-10 days.  SCALP WOUNDS: During the first two days, you may carefully rinse your hair in the shower to remove blood, glass or dirt particles. After two days, you may shower and shampoo your hair normally. Do not soak your scalp in the tub or go swimming until the stitches or staples have been removed.  MOUTH WOUNDS: Eat soft foods to reduce pain. If the cut is inside of your mouth, clean by rinsing after each meal and at bedtime with a mixture of equal parts water and Hydrogen Peroxide (do not swallow!). Or, you can use a cotton swab to directly apply Hydrogen Peroxide onto the cut.  After the wound is done healing, use sunscreen over the area whenever exposed for the next 6 minths to avoid a darker scar.     FOLLOW UP: Most skin wounds heal within ten days. Mouth and facial wounds heal within five days. However, even with proper treatment, a wound infection may sometimes occur. Therefore, you should check the wound daily for signs of infection listed below.  Stitches should be removed from the face within five days; stitches and staples should be removed from other parts of the body within 7-10 days. Unless you are told to come back to the emergency room, you may have your doctor or urgent care remove the stitches. If dissolving stitches were used in the mouth, these will fall out or dissolve without the need for removal. If tape closures ( Steri-Strips ) were used, remove them yourself if they have not fallen off after 7 days. If Dermabond skin glue was used, the film will fall off by itself in 5-10 days.      GET PROMPT MEDICAL ATTENTION  if any of the following occur:    Increasing pain in the wound    Redness, swelling or pus coming from the wound    Fever over 101 F (38.3 C) oral    If stitches or staples come apart or fall out or if Steri-Strips fall  off before seven days    If the wound edges re-open    Bleeding not controlled by direct pressure    1839-2831 [employer]. [address] [place] [address], [place] [address]. All rights reserved. This information is not intended as a substitute for professional medical care. Always follow your healthcare professional's instructions.  This information has been modified by your health care provider with permission from the publisher.      Abrasions  Abrasions are skin scrapes. Their treatment depends on how large and deep the abrasion is.  Home care  You may be prescribed an antibiotic cream or ointment to apply to the wound. This helps prevent infection. Follow instructions when using this medicine.  General care    To care for the abrasion, do the following each day for as long as directed by your healthcare provider.  ? If you were given a bandage, change it once a day. If your bandage sticks to the wound, soak it in warm water until it loosens.  ? Wash the area with soap and warm water. You may do this in a sink or under a tub faucet or shower. Rinse off the soap. Then pat the area dry with a clean towel.  ? If antibiotic ointment or cream was prescribed, reapply it to the wound as directed. Cover the wound with a fresh nonstick bandage. If the bandage becomes wet or dirty, change it as soon as possible.  ? Some antibiotic ointments or cream can cause an allergic reaction or dermatitis. This may cause redness, itching and or hives. If this occurs, stop using the ointment immediately and wash off any remaining ointment. You may need to take some allergy medicine to relieve symptoms.    You may use acetaminophen or ibuprofen to control pain unless another pain medicine was prescribed. Talk with your healthcare provider before using these medicines if you have chronic liver or kidney disease or ever had a stomach ulcer or GI bleeding. Don t use ibuprofen in children younger than six months old.    Most skin  wounds heal within 10 days. But an infection may occur even with treatment. So it s important to watch the wound for signs of infection as listed below.  Follow-up care  Follow up with your healthcare provider, or as advised.  When to seek medical advice  Call your healthcare provider right away if any of these occur:    Fever of 100.4 F (38 C) or higher, or as directed by your healthcare provider    Increasing pain, redness, swelling, or drainage from the wound    Bleeding from the wound that does not stop after a few minutes of steady, firm pressure    Decreased ability to move any body part near the wound  Date Last Reviewed: 3/3/2017    6055-2948 [employer]. [address] [place] [address], [place] [address]. All rights reserved. This information is not intended as a substitute for professional medical care. Always follow your healthcare professional's instructions.

## 2018-08-29 NOTE — ED AVS SNAPSHOT
Emergency Department    [address] [facility] [address]    Phone:  [phone]    Fax:  [phone]                                       Mariposa Mendez   MRN: [id number]    Department:   Emergency Department   Date of Visit:  8/29/2018           Patient Information     Date Of Birth          1945        Your diagnoses for this visit were:     Lip laceration, initial encounter     Facial abrasion, initial encounter        You were seen by Farnaz Allison MD.      Follow-up Information     Follow up with Patria Shea MD.    Why:  As needed. Your sutures will dissolve. Put antibiotic ointment on your wounds until they scab    Contact information:    [address] [address]  [phone]          Discharge Instructions          * LACERATION (All Closures)  A laceration is a cut through the skin. This will usually require stitches (sutures) or staples if it is deep. Minor cuts may be treated with a tape closure ( Steri-Strips ) or Dermabond skin glue.       HOME CARE:  PAIN MEDICINE: You may use acetaminophen (Tylenol) 650-1000 mg every 6 hours or ibuprofen (Motrin, Advil) 600 mg every 6-8 hours with food to control pain, if you are able to take these medicines. [NOTE: If you have chronic liver or kidney disease or ever had a stomach ulcer or GI bleeding, talk with your doctor before using these medicines.]  EXTREMITY, FACE or TRUNK WOUNDS:    Keep the wound clean and dry. If a bandage was applied and it becomes wet or dirty, replace it. Otherwise, leave it in place for the first 24 hours.    If stitches or staples were used, clean the wound daily. Protect the wound from sunlight and tanning lamps.    After removing the bandage, wash the area with soap and water. Use a wet cotton swab (Q tip) to loosen and remove any blood or crust that forms.    After cleaning, apply a thin layer of Polysporin or Bacitracin ointment. This will keep the wound clean and  make it easier to remove the stitches or staples. Reapply a fresh bandage.    You may remove the bandage to shower as usual after the first 24 hours, but do not soak the area in water (no swimming) until the stitches or staples are removed.    If Steri-Strips were used, keep the area clean and dry. If it becomes wet, blot it dry with a towel. It is okay to take a brief shower, but avoid scrubbing the area.    If Dermabond skin adhesive was used, do not scratch, rub or pick at the adhesive film. Do not place tape directly over the film. Do not apply liquid, ointment or creams to the wound while the film is in place. Do not clean the wound with peroxide and do not apply ointments. Avoid activities that cause heavy sweating until the film has fallen off. Protect the wound from prolonged exposure to sunlight or tanning lamps. You may shower as usual but do not soak the wound in water (no baths or swimming). The film will fall off by itself in 5-10 days.  SCALP WOUNDS: During the first two days, you may carefully rinse your hair in the shower to remove blood, glass or dirt particles. After two days, you may shower and shampoo your hair normally. Do not soak your scalp in the tub or go swimming until the stitches or staples have been removed.  MOUTH WOUNDS: Eat soft foods to reduce pain. If the cut is inside of your mouth, clean by rinsing after each meal and at bedtime with a mixture of equal parts water and Hydrogen Peroxide (do not swallow!). Or, you can use a cotton swab to directly apply Hydrogen Peroxide onto the cut.  After the wound is done healing, use sunscreen over the area whenever exposed for the next 6 minths to avoid a darker scar.     FOLLOW UP: Most skin wounds heal within ten days. Mouth and facial wounds heal within five days. However, even with proper treatment, a wound infection may sometimes occur. Therefore, you should check the wound daily for signs of infection listed below.  Stitches should be  removed from the face within five days; stitches and staples should be removed from other parts of the body within 7-10 days. Unless you are told to come back to the emergency room, you may have your doctor or urgent care remove the stitches. If dissolving stitches were used in the mouth, these will fall out or dissolve without the need for removal. If tape closures ( Steri-Strips ) were used, remove them yourself if they have not fallen off after 7 days. If Dermabond skin glue was used, the film will fall off by itself in 5-10 days.      GET PROMPT MEDICAL ATTENTION  if any of the following occur:    Increasing pain in the wound    Redness, swelling or pus coming from the wound    Fever over 101 F (38.3 C) oral    If stitches or staples come apart or fall out or if Steri-Strips fall off before seven days    If the wound edges re-open    Bleeding not controlled by direct pressure    5564-2225 [employer]. [address] [place] [address], [place] [address]. All rights reserved. This information is not intended as a substitute for professional medical care. Always follow your healthcare professional's instructions.  This information has been modified by your health care provider with permission from the publisher.      Abrasions  Abrasions are skin scrapes. Their treatment depends on how large and deep the abrasion is.  Home care  You may be prescribed an antibiotic cream or ointment to apply to the wound. This helps prevent infection. Follow instructions when using this medicine.  General care    To care for the abrasion, do the following each day for as long as directed by your healthcare provider.  ? If you were given a bandage, change it once a day. If your bandage sticks to the wound, soak it in warm water until it loosens.  ? Wash the area with soap and warm water. You may do this in a sink or under a tub faucet or shower. Rinse off the soap. Then pat the area dry with a clean towel.  ? If antibiotic  ointment or cream was prescribed, reapply it to the wound as directed. Cover the wound with a fresh nonstick bandage. If the bandage becomes wet or dirty, change it as soon as possible.  ? Some antibiotic ointments or cream can cause an allergic reaction or dermatitis. This may cause redness, itching and or hives. If this occurs, stop using the ointment immediately and wash off any remaining ointment. You may need to take some allergy medicine to relieve symptoms.    You may use acetaminophen or ibuprofen to control pain unless another pain medicine was prescribed. Talk with your healthcare provider before using these medicines if you have chronic liver or kidney disease or ever had a stomach ulcer or GI bleeding. Don t use ibuprofen in children younger than six months old.    Most skin wounds heal within 10 days. But an infection may occur even with treatment. So it s important to watch the wound for signs of infection as listed below.  Follow-up care  Follow up with your healthcare provider, or as advised.  When to seek medical advice  Call your healthcare provider right away if any of these occur:    Fever of 100.4 F (38 C) or higher, or as directed by your healthcare provider    Increasing pain, redness, swelling, or drainage from the wound    Bleeding from the wound that does not stop after a few minutes of steady, firm pressure    Decreased ability to move any body part near the wound  Date Last Reviewed: 3/3/2017    0443-0501 The [employer]. [address] [place] [address]. All rights reserved. This information is not intended as a substitute for professional medical care. Always follow your healthcare professional's instructions.          24 Hour Appointment Hotline       To make an appointment at any [facility], call [phone] ([phone]). If you don't have a family doctor or clinic, we will help you find one. [facility] clinics are conveniently located to serve the needs  of you and your family.             Review of your medicines      Our records show that you are taking the medicines listed below. If these are incorrect, please call your family doctor or clinic.        Dose / Directions Last dose taken    acetaminophen 325 MG tablet   Commonly known as:  TYLENOL   Dose:  650 mg   Quantity:  50 tablet        Take 2 tablets (650 mg) by mouth every 4 hours as needed for mild pain   Refills:  0        aspirin 81 MG tablet   Quantity:  100        1 tab po QD (Once per day)   Refills:  3        BUPROPION HCL PO   Dose:  150 mg        Take 150 mg by mouth every morning (Takes 2 x 75mg immediate release tablets for a total of 150mg in the morning- this dose was verified with patient's retail pharmacy - [facility])   Refills:  0        EMERGEN-C VITAMIN C Pack   Dose:  1 packet        Take 1 packet by mouth daily   Refills:  0        FLUOXETINE HCL PO   Dose:  40 mg        Take 40 mg by mouth every morning (Takes 2 x 20mg for a total of 40mg daily)   Refills:  0        LEVOTHYROXINE SODIUM PO   Dose:  88 mcg        Take 88 mcg by mouth daily   Refills:  0        order for DME   Quantity:  1 each        Equipment being ordered: [employer] () Treatment Diagnosis: Difficulty with gait due to LE weakness and unable to hold walker with right hand   Refills:  0        TRAZODONE HCL PO   Dose:  150 mg        Take 150 mg by mouth At Bedtime (Takes 1.5 x 100mg tablet for a total of 150mg at bedtime)   Refills:  0        VITAMIN D (CHOLECALCIFEROL) PO   Dose:  1000 Units        Take 1,000 Units by mouth daily   Refills:  0        VITAMIN E NATURAL PO   Dose:  400 Units        Take 400 Units by mouth daily   Refills:  0                Orders Needing Specimen Collection     None      Pending Results     No orders found from 8/27/2018 to 8/30/2018.            Pending Culture Results     No orders found from 8/27/2018 to 8/30/2018.            Pending Results Instructions     If you had any lab  results that were not finalized at the time of your Discharge, you can call the ED Lab Result RN at [phone]. You will be contacted by this team for any positive Lab results or changes in treatment. The nurses are available 7 days a week from 10A to 6:30P.  You can leave a message 24 hours per day and they will return your call.        Test Results From Your Hospital Stay               Clinical Quality Measure: Blood Pressure Screening     Your blood pressure was checked while you were in the emergency department today. The last reading we obtained was  BP: 129/80 . Please read the guidelines below about what these numbers mean and what you should do about them.  If your systolic blood pressure (the top number) is less than 120 and your diastolic blood pressure (the bottom number) is less than 80, then your blood pressure is normal. There is nothing more that you need to do about it.  If your systolic blood pressure (the top number) is 120-139 or your diastolic blood pressure (the bottom number) is 80-89, your blood pressure may be higher than it should be. You should have your blood pressure rechecked within a year by a primary care provider.  If your systolic blood pressure (the top number) is 140 or greater or your diastolic blood pressure (the bottom number) is 90 or greater, you may have high blood pressure. High blood pressure is treatable, but if left untreated over time it can put you at risk for heart attack, stroke, or kidney failure. You should have your blood pressure rechecked by a primary care provider within the next 4 weeks.  If your provider in the emergency department today gave you specific instructions to follow-up with your doctor or provider even sooner than that, you should follow that instruction and not wait for up to 4 weeks for your follow-up visit.        Thank you for choosing [facility]       Thank you for choosing [facility] for your care. Our goal is always to provide you with  "excellent care. Hearing back from our patients is one way we can continue to improve our services. Please take a few minutes to complete the written survey that you may receive in the mail after you visit with us. Thank you!        [employer] Information     [employer] lets you send messages to your doctor, view your test results, renew your prescriptions, schedule appointments and more. To sign up, go to [email or web address] . Click on \"Log in\" on the left side of the screen, which will take you to the Welcome page. Then click on \"Sign up Now\" on the right side of the page.     You will be asked to enter the access code listed below, as well as some personal information. Please follow the directions to create your username and password.     Your access code is: [email or web address]  Expires: 2018  5:48 PM     Your access code will  in 90 days. If you need help or a new code, please call your [facility] clinic or [phone].        Care EveryWhere ID     This is your Care EveryWhere ID. This could be used by other organizations to access your [facility] medical records  [id number]        Equal Access to Services     JAN CASTRO : Hadii rafal Santizo, terra fisher, susie pastor, remi murdock . So [facility] [phone].    ATENCIÓN: Si habla español, tiene a renner disposición servicios gratuitos de asistencia lingüística. Llame al [phone].    We comply with applicable federal civil rights laws and [place] laws. We do not discriminate on the basis of race, color, national origin, age, disability, sex, sexual orientation, or gender identity.            After Visit Summary       This is your record. Keep this with you and show to your community pharmacist(s) and doctor(s) at your next visit.                  "

## 2018-08-29 NOTE — ED NOTES
Bed: ED14  Expected date:   Expected time:   Means of arrival:   Comments:  [facility] [place] - [phone] F fall eta 1540

## 2022-01-01 ENCOUNTER — DOCUMENTATION ONLY (OUTPATIENT)
Dept: OTHER | Facility: CLINIC | Age: 77
End: 2022-01-01
Payer: COMMERCIAL

## 2022-01-01 ENCOUNTER — HOSPITAL ENCOUNTER (INPATIENT)
Facility: CLINIC | Age: 77
LOS: 7 days | Discharge: NURSING, REHAB OR LONG-TERM CARE | DRG: 884 | End: 2022-06-17
Attending: EMERGENCY MEDICINE | Admitting: INTERNAL MEDICINE
Payer: COMMERCIAL

## 2022-01-01 ENCOUNTER — PATIENT OUTREACH (OUTPATIENT)
Dept: CARE COORDINATION | Facility: CLINIC | Age: 77
End: 2022-01-01
Payer: COMMERCIAL

## 2022-01-01 VITALS
OXYGEN SATURATION: 94 % | SYSTOLIC BLOOD PRESSURE: 112 MMHG | HEART RATE: 88 BPM | DIASTOLIC BLOOD PRESSURE: 71 MMHG | TEMPERATURE: 97.5 F | WEIGHT: 131.7 LBS | BODY MASS INDEX: 22.61 KG/M2 | RESPIRATION RATE: 18 BRPM

## 2022-01-01 DIAGNOSIS — E03.9 HYPOTHYROIDISM, UNSPECIFIED TYPE: Primary | ICD-10-CM

## 2022-01-01 DIAGNOSIS — F03.91 DEMENTIA WITH BEHAVIORAL DISTURBANCE, UNSPECIFIED DEMENTIA TYPE: ICD-10-CM

## 2022-01-01 DIAGNOSIS — R62.7 FAILURE TO THRIVE IN ADULT: ICD-10-CM
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
## 2022-01-01 DIAGNOSIS — Z71.89 OTHER SPECIFIED COUNSELING: ICD-10-CM

## 2022-01-01 LAB
ALBUMIN UR-MCNC: NEGATIVE MG/DL
ANION GAP SERPL CALCULATED.3IONS-SCNC: 6 MMOL/L (ref 3–14)
APPEARANCE UR: CLEAR
BACTERIA UR CULT: NORMAL
BASOPHILS # BLD AUTO: 0.1 10E3/UL (ref 0–0.2)
BASOPHILS NFR BLD AUTO: 1 %
BILIRUB UR QL STRIP: NEGATIVE
BUN SERPL-MCNC: 16 MG/DL (ref 7–30)
CALCIUM SERPL-MCNC: 9.2 MG/DL (ref 8.5–10.1)
CHLORIDE BLD-SCNC: 109 MMOL/L (ref 94–109)
CO2 SERPL-SCNC: 25 MMOL/L (ref 20–32)
COLOR UR AUTO: ABNORMAL
CREAT SERPL-MCNC: 0.76 MG/DL (ref 0.52–1.04)
EOSINOPHIL # BLD AUTO: 0.2 10E3/UL (ref 0–0.7)
EOSINOPHIL NFR BLD AUTO: 2 %
ERYTHROCYTE [DISTWIDTH] IN BLOOD BY AUTOMATED COUNT: 12.6 % (ref 10–15)
GFR SERPL CREATININE-BSD FRML MDRD: 80 ML/MIN/1.73M2
GLUCOSE BLD-MCNC: 90 MG/DL (ref 70–99)
GLUCOSE UR STRIP-MCNC: NEGATIVE MG/DL
HCT VFR BLD AUTO: 43.8 % (ref 35–47)
HGB BLD-MCNC: 14.5 G/DL (ref 11.7–15.7)
HGB UR QL STRIP: NEGATIVE
IMM GRANULOCYTES # BLD: 0 10E3/UL
IMM GRANULOCYTES NFR BLD: 0 %
KETONES UR STRIP-MCNC: NEGATIVE MG/DL
LEUKOCYTE ESTERASE UR QL STRIP: ABNORMAL
LYMPHOCYTES # BLD AUTO: 2.2 10E3/UL (ref 0.8–5.3)
LYMPHOCYTES NFR BLD AUTO: 28 %
MCH RBC QN AUTO: 30.3 PG (ref 26.5–33)
MCHC RBC AUTO-ENTMCNC: 33.1 G/DL (ref 31.5–36.5)
MCV RBC AUTO: 91 FL (ref 78–100)
MONOCYTES # BLD AUTO: 0.5 10E3/UL (ref 0–1.3)
MONOCYTES NFR BLD AUTO: 7 %
MUCOUS THREADS #/AREA URNS LPF: PRESENT /LPF
NEUTROPHILS # BLD AUTO: 4.9 10E3/UL (ref 1.6–8.3)
NEUTROPHILS NFR BLD AUTO: 62 %
NITRATE UR QL: NEGATIVE
NRBC # BLD AUTO: 0 10E3/UL
NRBC BLD AUTO-RTO: 0 /100
PH UR STRIP: 6 [PH] (ref 5–7)
PLATELET # BLD AUTO: 296 10E3/UL (ref 150–450)
POTASSIUM BLD-SCNC: 3.5 MMOL/L (ref 3.4–5.3)
RBC # BLD AUTO: 4.79 10E6/UL (ref 3.8–5.2)
RBC URINE: 2 /HPF
SARS-COV-2 RNA RESP QL NAA+PROBE: NEGATIVE
SODIUM SERPL-SCNC: 140 MMOL/L (ref 133–144)
SP GR UR STRIP: 1.01 (ref 1–1.03)
SQUAMOUS EPITHELIAL: <1 /HPF
T4 FREE SERPL-MCNC: 0.69 NG/DL (ref 0.76–1.46)
TSH SERPL DL<=0.005 MIU/L-ACNC: 29.12 MU/L (ref 0.4–4)
UROBILINOGEN UR STRIP-MCNC: NORMAL MG/DL
WBC # BLD AUTO: 7.8 10E3/UL (ref 4–11)
WBC URINE: 19 /HPF

## 2022-01-01 PROCEDURE — 120N000001 HC R&B MED SURG/OB

## 2022-01-01 PROCEDURE — 99232 SBSQ HOSP IP/OBS MODERATE 35: CPT | Performed by: INTERNAL MEDICINE

## 2022-01-01 PROCEDURE — 250N000013 HC RX MED GY IP 250 OP 250 PS 637: Performed by: INTERNAL MEDICINE

## 2022-01-01 PROCEDURE — 99232 SBSQ HOSP IP/OBS MODERATE 35: CPT | Mod: 25

## 2022-01-01 PROCEDURE — U0005 INFEC AGEN DETEC AMPLI PROBE: HCPCS | Performed by: EMERGENCY MEDICINE

## 2022-01-01 PROCEDURE — G0378 HOSPITAL OBSERVATION PER HR: HCPCS

## 2022-01-01 PROCEDURE — 85025 COMPLETE CBC W/AUTO DIFF WBC: CPT | Performed by: EMERGENCY MEDICINE

## 2022-01-01 PROCEDURE — 36415 COLL VENOUS BLD VENIPUNCTURE: CPT | Performed by: EMERGENCY MEDICINE

## 2022-01-01 PROCEDURE — 250N000013 HC RX MED GY IP 250 OP 250 PS 637: Performed by: NURSE PRACTITIONER

## 2022-01-01 PROCEDURE — 250N000013 HC RX MED GY IP 250 OP 250 PS 637: Performed by: EMERGENCY MEDICINE

## 2022-01-01 PROCEDURE — 250N000013 HC RX MED GY IP 250 OP 250 PS 637: Performed by: HOSPITALIST

## 2022-01-01 PROCEDURE — 99231 SBSQ HOSP IP/OBS SF/LOW 25: CPT | Performed by: HOSPITALIST

## 2022-01-01 PROCEDURE — 81001 URINALYSIS AUTO W/SCOPE: CPT | Performed by: EMERGENCY MEDICINE

## 2022-01-01 PROCEDURE — 96374 THER/PROPH/DIAG INJ IV PUSH: CPT

## 2022-01-01 PROCEDURE — 84443 ASSAY THYROID STIM HORMONE: CPT | Performed by: INTERNAL MEDICINE

## 2022-01-01 PROCEDURE — C9803 HOPD COVID-19 SPEC COLLECT: HCPCS

## 2022-01-01 PROCEDURE — 99232 SBSQ HOSP IP/OBS MODERATE 35: CPT | Performed by: HOSPITALIST

## 2022-01-01 PROCEDURE — 250N000011 HC RX IP 250 OP 636: Performed by: INTERNAL MEDICINE

## 2022-01-01 PROCEDURE — 84439 ASSAY OF FREE THYROXINE: CPT | Performed by: INTERNAL MEDICINE

## 2022-01-01 PROCEDURE — 99223 1ST HOSP IP/OBS HIGH 75: CPT | Performed by: NURSE PRACTITIONER

## 2022-01-01 PROCEDURE — 99285 EMERGENCY DEPT VISIT HI MDM: CPT

## 2022-01-01 PROCEDURE — 99239 HOSP IP/OBS DSCHRG MGMT >30: CPT | Performed by: HOSPITALIST

## 2022-01-01 PROCEDURE — 80048 BASIC METABOLIC PNL TOTAL CA: CPT | Performed by: EMERGENCY MEDICINE

## 2022-01-01 PROCEDURE — 99220 PR INITIAL OBSERVATION CARE,LEVEL III: CPT | Performed by: INTERNAL MEDICINE

## 2022-01-01 PROCEDURE — 87086 URINE CULTURE/COLONY COUNT: CPT | Performed by: INTERNAL MEDICINE

## 2022-01-01 RX ORDER — LEVOTHYROXINE SODIUM 100 UG/1
100 TABLET ORAL DAILY
Status: DISCONTINUED | OUTPATIENT
Start: 2022-01-01 | End: 2022-01-01

## 2022-01-01 RX ORDER — ONDANSETRON 4 MG/1
4 TABLET, ORALLY DISINTEGRATING ORAL EVERY 6 HOURS PRN
Status: DISCONTINUED | OUTPATIENT
Start: 2022-01-01 | End: 2022-01-01 | Stop reason: HOSPADM

## 2022-01-01 RX ORDER — ACETAMINOPHEN 325 MG/1
650 TABLET ORAL EVERY 6 HOURS PRN
Status: DISCONTINUED | OUTPATIENT
Start: 2022-01-01 | End: 2022-01-01 | Stop reason: HOSPADM

## 2022-01-01 RX ORDER — LANOLIN ALCOHOL/MO/W.PET/CERES
1000 CREAM (GRAM) TOPICAL DAILY
Qty: 7 TABLET | Refills: 0 | Status: SHIPPED | OUTPATIENT
Start: 2022-01-01

## 2022-01-01 RX ORDER — LEVOTHYROXINE SODIUM 112 UG/1
112 TABLET ORAL DAILY
Status: DISCONTINUED | OUTPATIENT
Start: 2022-01-01 | End: 2022-01-01

## 2022-01-01 RX ORDER — LEVOTHYROXINE SODIUM 100 UG/1
100 TABLET ORAL DAILY
Qty: 7 TABLET | Refills: 0 | Status: SHIPPED | OUTPATIENT
Start: 2022-01-01

## 2022-01-01 RX ORDER — AMOXICILLIN 250 MG
2 CAPSULE ORAL 2 TIMES DAILY PRN
Status: DISCONTINUED | OUTPATIENT
Start: 2022-01-01 | End: 2022-01-01 | Stop reason: HOSPADM

## 2022-01-01 RX ORDER — ASCORBIC ACID/MULTIVIT-MIN 1000 MG
1 EFFERVESCENT POWDER IN PACKET ORAL DAILY
Qty: 7 EACH | Refills: 0 | Status: SHIPPED | OUTPATIENT
Start: 2022-01-01

## 2022-01-01 RX ORDER — FLUOXETINE 10 MG/1
10 CAPSULE ORAL DAILY
Status: DISCONTINUED | OUTPATIENT
Start: 2022-01-01 | End: 2022-01-01 | Stop reason: HOSPADM

## 2022-01-01 RX ORDER — OLANZAPINE 10 MG/2ML
2.5 INJECTION, POWDER, FOR SOLUTION INTRAMUSCULAR EVERY 6 HOURS PRN
Status: DISCONTINUED | OUTPATIENT
Start: 2022-01-01 | End: 2022-01-01

## 2022-01-01 RX ORDER — LANOLIN ALCOHOL/MO/W.PET/CERES
1000 CREAM (GRAM) TOPICAL DAILY
Status: ON HOLD | COMMUNITY
End: 2022-01-01

## 2022-01-01 RX ORDER — CEFUROXIME AXETIL 500 MG/1
500 TABLET ORAL EVERY 12 HOURS SCHEDULED
Status: DISCONTINUED | OUTPATIENT
Start: 2022-01-01 | End: 2022-01-01

## 2022-01-01 RX ORDER — BUPROPION HYDROCHLORIDE 75 MG/1
150 TABLET ORAL DAILY
Status: ON HOLD | COMMUNITY
End: 2022-01-01

## 2022-01-01 RX ORDER — OLANZAPINE 5 MG/1
5 TABLET, ORALLY DISINTEGRATING ORAL ONCE
Status: COMPLETED | OUTPATIENT
Start: 2022-01-01 | End: 2022-01-01

## 2022-01-01 RX ORDER — TRAZODONE HYDROCHLORIDE 100 MG/1
100 TABLET ORAL AT BEDTIME
Qty: 7 TABLET | Refills: 0 | Status: SHIPPED | OUTPATIENT
Start: 2022-01-01

## 2022-01-01 RX ORDER — ACETAMINOPHEN 325 MG/1
325-650 TABLET ORAL EVERY 4 HOURS PRN
Qty: 10 TABLET | Refills: 0 | Status: SHIPPED | OUTPATIENT
Start: 2022-01-01

## 2022-01-01 RX ORDER — HALOPERIDOL 5 MG/ML
1 INJECTION INTRAMUSCULAR EVERY 6 HOURS PRN
Status: DISCONTINUED | OUTPATIENT
Start: 2022-01-01 | End: 2022-01-01

## 2022-01-01 RX ORDER — LEVOTHYROXINE SODIUM 100 UG/1
100 TABLET ORAL DAILY
Status: DISCONTINUED | OUTPATIENT
Start: 2022-01-01 | End: 2022-01-01 | Stop reason: HOSPADM

## 2022-01-01 RX ORDER — ACETAMINOPHEN 650 MG/1
650 SUPPOSITORY RECTAL EVERY 6 HOURS PRN
Status: DISCONTINUED | OUTPATIENT
Start: 2022-01-01 | End: 2022-01-01 | Stop reason: HOSPADM

## 2022-01-01 RX ORDER — CEFTRIAXONE 1 G/1
1 INJECTION, POWDER, FOR SOLUTION INTRAMUSCULAR; INTRAVENOUS EVERY 24 HOURS
Status: DISCONTINUED | OUTPATIENT
Start: 2022-01-01 | End: 2022-01-01

## 2022-01-01 RX ORDER — BENZTROPINE MESYLATE 1 MG/1
1 TABLET ORAL 3 TIMES DAILY PRN
Qty: 10 TABLET | Refills: 0 | Status: SHIPPED | OUTPATIENT
Start: 2022-01-01

## 2022-01-01 RX ORDER — AMOXICILLIN 250 MG
1 CAPSULE ORAL 2 TIMES DAILY PRN
Status: DISCONTINUED | OUTPATIENT
Start: 2022-01-01 | End: 2022-01-01 | Stop reason: HOSPADM

## 2022-01-01 RX ORDER — LANOLIN ALCOHOL/MO/W.PET/CERES
1000 CREAM (GRAM) TOPICAL DAILY
Status: DISCONTINUED | OUTPATIENT
Start: 2022-01-01 | End: 2022-01-01 | Stop reason: HOSPADM

## 2022-01-01 RX ORDER — ONDANSETRON 2 MG/ML
4 INJECTION INTRAMUSCULAR; INTRAVENOUS EVERY 6 HOURS PRN
Status: DISCONTINUED | OUTPATIENT
Start: 2022-01-01 | End: 2022-01-01 | Stop reason: HOSPADM

## 2022-01-01 RX ORDER — POLYETHYLENE GLYCOL 3350 17 G/17G
17 POWDER, FOR SOLUTION ORAL DAILY PRN
Status: DISCONTINUED | OUTPATIENT
Start: 2022-01-01 | End: 2022-01-01 | Stop reason: HOSPADM

## 2022-01-01 RX ORDER — DONEPEZIL HYDROCHLORIDE 5 MG/1
5 TABLET, FILM COATED ORAL AT BEDTIME
Status: DISCONTINUED | OUTPATIENT
Start: 2022-01-01 | End: 2022-01-01 | Stop reason: HOSPADM

## 2022-01-01 RX ORDER — MULTIVIT-MIN/IRON/FOLIC ACID/K 18-600-40
1000 CAPSULE ORAL DAILY
Qty: 7 TABLET | Refills: 0 | Status: SHIPPED | OUTPATIENT
Start: 2022-01-01

## 2022-01-01 RX ORDER — VITAMIN E 268 MG
400 CAPSULE ORAL DAILY
Qty: 7 CAPSULE | Refills: 0 | Status: SHIPPED | OUTPATIENT
Start: 2022-01-01

## 2022-01-01 RX ORDER — OLANZAPINE 5 MG/1
5 TABLET, ORALLY DISINTEGRATING ORAL EVERY 6 HOURS PRN
Qty: 10 TABLET | Refills: 0 | Status: SHIPPED | OUTPATIENT
Start: 2022-01-01

## 2022-01-01 RX ORDER — PROCHLORPERAZINE MALEATE 5 MG
5 TABLET ORAL EVERY 6 HOURS PRN
Status: DISCONTINUED | OUTPATIENT
Start: 2022-01-01 | End: 2022-01-01 | Stop reason: HOSPADM

## 2022-01-01 RX ORDER — OLANZAPINE 10 MG/2ML
5 INJECTION, POWDER, FOR SOLUTION INTRAMUSCULAR EVERY 6 HOURS PRN
Status: DISCONTINUED | OUTPATIENT
Start: 2022-01-01 | End: 2022-01-01 | Stop reason: HOSPADM

## 2022-01-01 RX ORDER — TRAZODONE HYDROCHLORIDE 100 MG/1
100 TABLET ORAL AT BEDTIME
Status: DISCONTINUED | OUTPATIENT
Start: 2022-01-01 | End: 2022-01-01 | Stop reason: HOSPADM

## 2022-01-01 RX ORDER — OLANZAPINE 5 MG/1
5 TABLET, ORALLY DISINTEGRATING ORAL EVERY 6 HOURS PRN
Status: DISCONTINUED | OUTPATIENT
Start: 2022-01-01 | End: 2022-01-01 | Stop reason: HOSPADM

## 2022-01-01 RX ORDER — ACETAMINOPHEN 325 MG/1
650 TABLET ORAL EVERY 4 HOURS PRN
Status: DISCONTINUED | OUTPATIENT
Start: 2022-01-01 | End: 2022-01-01

## 2022-01-01 RX ORDER — DONEPEZIL HYDROCHLORIDE 5 MG/1
5 TABLET, FILM COATED ORAL AT BEDTIME
Qty: 7 TABLET | Refills: 0 | Status: SHIPPED | OUTPATIENT
Start: 2022-01-01

## 2022-01-01 RX ORDER — BUPROPION HYDROCHLORIDE 75 MG/1
150 TABLET ORAL DAILY
Status: DISCONTINUED | OUTPATIENT
Start: 2022-01-01 | End: 2022-01-01 | Stop reason: HOSPADM

## 2022-01-01 RX ORDER — PROCHLORPERAZINE 25 MG
12.5 SUPPOSITORY, RECTAL RECTAL EVERY 12 HOURS PRN
Status: DISCONTINUED | OUTPATIENT
Start: 2022-01-01 | End: 2022-01-01 | Stop reason: HOSPADM

## 2022-01-01 RX ORDER — BENZTROPINE MESYLATE 0.5 MG/1
1 TABLET ORAL 3 TIMES DAILY PRN
Status: DISCONTINUED | OUTPATIENT
Start: 2022-01-01 | End: 2022-01-01 | Stop reason: HOSPADM

## 2022-01-01 RX ADMIN — OLANZAPINE 5 MG: 5 TABLET, ORALLY DISINTEGRATING ORAL at 16:51

## 2022-01-01 RX ADMIN — QUETIAPINE 12.5 MG: 25 TABLET, FILM COATED ORAL at 17:29

## 2022-01-01 RX ADMIN — CYANOCOBALAMIN TAB 1000 MCG 1000 MCG: 1000 TAB at 09:23

## 2022-01-01 RX ADMIN — LEVOTHYROXINE SODIUM 100 MCG: 100 TABLET ORAL at 09:23

## 2022-01-01 RX ADMIN — ACETAMINOPHEN 650 MG: 325 TABLET ORAL at 15:59

## 2022-01-01 RX ADMIN — CYANOCOBALAMIN TAB 1000 MCG 1000 MCG: 1000 TAB at 07:54

## 2022-01-01 RX ADMIN — DONEPEZIL HYDROCHLORIDE 5 MG: 5 TABLET, FILM COATED ORAL at 22:05

## 2022-01-01 RX ADMIN — TRAZODONE HYDROCHLORIDE 100 MG: 100 TABLET ORAL at 21:08

## 2022-01-01 RX ADMIN — LEVOTHYROXINE SODIUM 100 MCG: 100 TABLET ORAL at 14:02

## 2022-01-01 RX ADMIN — LEVOTHYROXINE SODIUM 100 MCG: 100 TABLET ORAL at 12:42

## 2022-01-01 RX ADMIN — LEVOTHYROXINE SODIUM 100 MCG: 100 TABLET ORAL at 08:24

## 2022-01-01 RX ADMIN — ACETAMINOPHEN 650 MG: 325 TABLET ORAL at 19:46

## 2022-01-01 RX ADMIN — LEVOTHYROXINE SODIUM 100 MCG: 100 TABLET ORAL at 10:21

## 2022-01-01 RX ADMIN — TRAZODONE HYDROCHLORIDE 150 MG: 100 TABLET ORAL at 22:48

## 2022-01-01 RX ADMIN — CYANOCOBALAMIN TAB 1000 MCG 1000 MCG: 1000 TAB at 10:30

## 2022-01-01 RX ADMIN — LEVOTHYROXINE SODIUM 100 MCG: 100 TABLET ORAL at 10:30

## 2022-01-01 RX ADMIN — TRAZODONE HYDROCHLORIDE 100 MG: 100 TABLET ORAL at 21:33

## 2022-01-01 RX ADMIN — ACETAMINOPHEN 650 MG: 325 TABLET ORAL at 10:30

## 2022-01-01 RX ADMIN — DONEPEZIL HYDROCHLORIDE 5 MG: 5 TABLET, FILM COATED ORAL at 19:55

## 2022-01-01 RX ADMIN — OLANZAPINE 5 MG: 5 TABLET, ORALLY DISINTEGRATING ORAL at 10:30

## 2022-01-01 RX ADMIN — TRAZODONE HYDROCHLORIDE 100 MG: 100 TABLET ORAL at 21:53

## 2022-01-01 RX ADMIN — DONEPEZIL HYDROCHLORIDE 5 MG: 5 TABLET, FILM COATED ORAL at 21:08

## 2022-01-01 RX ADMIN — CYANOCOBALAMIN TAB 1000 MCG 1000 MCG: 1000 TAB at 10:21

## 2022-01-01 RX ADMIN — BUPROPION HYDROCHLORIDE 150 MG: 75 TABLET, FILM COATED ORAL at 17:56

## 2022-01-01 RX ADMIN — LEVOTHYROXINE SODIUM 100 MCG: 100 TABLET ORAL at 12:12

## 2022-01-01 RX ADMIN — QUETIAPINE 12.5 MG: 25 TABLET, FILM COATED ORAL at 09:25

## 2022-01-01 RX ADMIN — OLANZAPINE 5 MG: 5 TABLET, ORALLY DISINTEGRATING ORAL at 17:29

## 2022-01-01 RX ADMIN — TRAZODONE HYDROCHLORIDE 100 MG: 100 TABLET ORAL at 21:05

## 2022-01-01 RX ADMIN — LEVOTHYROXINE SODIUM 100 MCG: 100 TABLET ORAL at 14:01

## 2022-01-01 RX ADMIN — LEVOTHYROXINE SODIUM 100 MCG: 100 TABLET ORAL at 10:16

## 2022-01-01 RX ADMIN — DONEPEZIL HYDROCHLORIDE 5 MG: 5 TABLET, FILM COATED ORAL at 21:53

## 2022-01-01 RX ADMIN — OLANZAPINE 5 MG: 5 TABLET, ORALLY DISINTEGRATING ORAL at 19:18

## 2022-01-01 RX ADMIN — TRAZODONE HYDROCHLORIDE 100 MG: 100 TABLET ORAL at 18:59

## 2022-01-01 RX ADMIN — DONEPEZIL HYDROCHLORIDE 5 MG: 5 TABLET, FILM COATED ORAL at 21:02

## 2022-01-01 RX ADMIN — LEVOTHYROXINE SODIUM 100 MCG: 100 TABLET ORAL at 08:29

## 2022-01-01 RX ADMIN — CYANOCOBALAMIN TAB 1000 MCG 1000 MCG: 1000 TAB at 08:29

## 2022-01-01 RX ADMIN — CYANOCOBALAMIN TAB 1000 MCG 1000 MCG: 1000 TAB at 10:16

## 2022-01-01 RX ADMIN — BUPROPION HYDROCHLORIDE 150 MG: 75 TABLET, FILM COATED ORAL at 09:23

## 2022-01-01 RX ADMIN — DONEPEZIL HYDROCHLORIDE 5 MG: 5 TABLET, FILM COATED ORAL at 21:05

## 2022-01-01 RX ADMIN — ACETAMINOPHEN 650 MG: 325 TABLET ORAL at 10:16

## 2022-01-01 RX ADMIN — CYANOCOBALAMIN TAB 1000 MCG 1000 MCG: 1000 TAB at 12:12

## 2022-01-01 RX ADMIN — ACETAMINOPHEN 650 MG: 325 TABLET ORAL at 17:29

## 2022-01-01 RX ADMIN — DONEPEZIL HYDROCHLORIDE 5 MG: 5 TABLET, FILM COATED ORAL at 21:33

## 2022-01-01 RX ADMIN — LEVOTHYROXINE SODIUM 100 MCG: 100 TABLET ORAL at 07:53

## 2022-01-01 RX ADMIN — DONEPEZIL HYDROCHLORIDE 5 MG: 5 TABLET, FILM COATED ORAL at 18:59

## 2022-01-01 RX ADMIN — CEFTRIAXONE SODIUM 1 G: 1 INJECTION, POWDER, FOR SOLUTION INTRAMUSCULAR; INTRAVENOUS at 14:11

## 2022-01-01 RX ADMIN — OLANZAPINE 5 MG: 5 TABLET, ORALLY DISINTEGRATING ORAL at 10:21

## 2022-01-01 RX ADMIN — CYANOCOBALAMIN TAB 1000 MCG 1000 MCG: 1000 TAB at 14:02

## 2022-01-01 RX ADMIN — CYANOCOBALAMIN TAB 1000 MCG 1000 MCG: 1000 TAB at 12:43

## 2022-01-01 RX ADMIN — OLANZAPINE 5 MG: 5 TABLET, ORALLY DISINTEGRATING ORAL at 00:37

## 2022-01-01 RX ADMIN — ACETAMINOPHEN 650 MG: 325 TABLET ORAL at 18:14

## 2022-01-01 RX ADMIN — QUETIAPINE 12.5 MG: 25 TABLET, FILM COATED ORAL at 19:46

## 2022-01-01 RX ADMIN — FLUOXETINE 10 MG: 10 CAPSULE ORAL at 09:23

## 2022-01-01 RX ADMIN — CYANOCOBALAMIN TAB 1000 MCG 1000 MCG: 1000 TAB at 08:23

## 2022-01-01 RX ADMIN — TRAZODONE HYDROCHLORIDE 100 MG: 100 TABLET ORAL at 19:55

## 2022-01-01 RX ADMIN — OLANZAPINE 5 MG: 5 TABLET, ORALLY DISINTEGRATING ORAL at 14:01

## 2022-01-01 RX ADMIN — CYANOCOBALAMIN TAB 1000 MCG 1000 MCG: 1000 TAB at 14:01

## 2022-01-01 RX ADMIN — CYANOCOBALAMIN TAB 1000 MCG 1000 MCG: 1000 TAB at 10:17

## 2022-01-01 RX ADMIN — OLANZAPINE 5 MG: 5 TABLET, ORALLY DISINTEGRATING ORAL at 10:16

## 2022-01-01 RX ADMIN — TRAZODONE HYDROCHLORIDE 100 MG: 100 TABLET ORAL at 21:02

## 2022-01-01 RX ADMIN — ACETAMINOPHEN 650 MG: 325 TABLET ORAL at 10:21

## 2022-01-01 RX ADMIN — QUETIAPINE 12.5 MG: 25 TABLET, FILM COATED ORAL at 20:52

## 2022-01-01 RX ADMIN — TRAZODONE HYDROCHLORIDE 100 MG: 100 TABLET ORAL at 22:46

## 2022-01-01 ASSESSMENT — ACTIVITIES OF DAILY LIVING (ADL)
ADLS_ACUITY_SCORE: 46
ADLS_ACUITY_SCORE: 46
ADLS_ACUITY_SCORE: 40
ADLS_ACUITY_SCORE: 46
ADLS_ACUITY_SCORE: 46
FALL_HISTORY_WITHIN_LAST_SIX_MONTHS: YES
TOILETING: 1-->ASSISTANCE (EQUIPMENT/PERSON) NEEDED (NOT DEVELOPMENTALLY APPROPRIATE)
ADLS_ACUITY_SCORE: 40
ADLS_ACUITY_SCORE: 43
ADLS_ACUITY_SCORE: 40
ADLS_ACUITY_SCORE: 46
DIFFICULTY_EATING/SWALLOWING: NO
TRANSFERRING: 0-->INDEPENDENT
ADLS_ACUITY_SCORE: 38
ADLS_ACUITY_SCORE: 39
ADLS_ACUITY_SCORE: 46
ADLS_ACUITY_SCORE: 46
CHANGE_IN_FUNCTIONAL_STATUS_SINCE_ONSET_OF_CURRENT_ILLNESS/INJURY: NO
ADLS_ACUITY_SCORE: 46
NUMBER_OF_TIMES_PATIENT_HAS_FALLEN_WITHIN_LAST_SIX_MONTHS: 1
ADLS_ACUITY_SCORE: 46
ADLS_ACUITY_SCORE: 46
CONCENTRATING,_REMEMBERING_OR_MAKING_DECISIONS_DIFFICULTY: YES
ADLS_ACUITY_SCORE: 46
ADLS_ACUITY_SCORE: 45
ADLS_ACUITY_SCORE: 43
ADLS_ACUITY_SCORE: 40
DRESSING/BATHING_DIFFICULTY: YES
ADLS_ACUITY_SCORE: 46
TRANSFERRING: 0-->INDEPENDENT
ADLS_ACUITY_SCORE: 46
ADLS_ACUITY_SCORE: 40
ADLS_ACUITY_SCORE: 46
ADLS_ACUITY_SCORE: 44
ADLS_ACUITY_SCORE: 46
ADLS_ACUITY_SCORE: 44
ADLS_ACUITY_SCORE: 44
DRESS: 1-->ASSISTANCE (EQUIPMENT/PERSON) NEEDED
ADLS_ACUITY_SCORE: 46
ADLS_ACUITY_SCORE: 43
ADLS_ACUITY_SCORE: 38
ADLS_ACUITY_SCORE: 46
ADLS_ACUITY_SCORE: 38
ADLS_ACUITY_SCORE: 46
ADLS_ACUITY_SCORE: 44
DOING_ERRANDS_INDEPENDENTLY_DIFFICULTY: YES
ADLS_ACUITY_SCORE: 46
ADLS_ACUITY_SCORE: 38
ADLS_ACUITY_SCORE: 39
ADLS_ACUITY_SCORE: 38
ADLS_ACUITY_SCORE: 46
WALKING_OR_CLIMBING_STAIRS: AMBULATION DIFFICULTY, ASSISTANCE 1 PERSON
ADLS_ACUITY_SCORE: 46
TOILETING_ISSUES: YES
ADLS_ACUITY_SCORE: 46
ADLS_ACUITY_SCORE: 46
ADLS_ACUITY_SCORE: 38
ADLS_ACUITY_SCORE: 44
ADLS_ACUITY_SCORE: 46
ADLS_ACUITY_SCORE: 43
ADLS_ACUITY_SCORE: 44
ADLS_ACUITY_SCORE: 46
ADLS_ACUITY_SCORE: 38
ADLS_ACUITY_SCORE: 42
BATHING: 1-->ASSISTANCE NEEDED
DEPENDENT_IADLS:: CLEANING;COOKING;LAUNDRY;MEAL PREPARATION;MEDICATION MANAGEMENT;MONEY MANAGEMENT
ADLS_ACUITY_SCORE: 42
WEAR_GLASSES_OR_BLIND: NO
ADLS_ACUITY_SCORE: 38
WALKING_OR_CLIMBING_STAIRS_DIFFICULTY: YES
EQUIPMENT_CURRENTLY_USED_AT_HOME: WALKER, ROLLING
ADLS_ACUITY_SCORE: 46
ADLS_ACUITY_SCORE: 40
TOILETING_ASSISTANCE: TOILETING DIFFICULTY, ASSISTANCE 1 PERSON
ADLS_ACUITY_SCORE: 46
DRESSING/BATHING: BATHING DIFFICULTY, ASSISTANCE 1 PERSON
ADLS_ACUITY_SCORE: 46
TOILETING: 1-->ASSISTANCE (EQUIPMENT/PERSON) NEEDED
ADLS_ACUITY_SCORE: 46
ADLS_ACUITY_SCORE: 38
ADLS_ACUITY_SCORE: 46
ADLS_ACUITY_SCORE: 40
ADLS_ACUITY_SCORE: 39
ADLS_ACUITY_SCORE: 46
ADLS_ACUITY_SCORE: 46
ADLS_ACUITY_SCORE: 40
ADLS_ACUITY_SCORE: 40
ADLS_ACUITY_SCORE: 46
ADLS_ACUITY_SCORE: 38
ADLS_ACUITY_SCORE: 46
ADLS_ACUITY_SCORE: 38
DRESS: 1-->ASSISTANCE (EQUIPMENT/PERSON) NEEDED (NOT DEVELOPMENTALLY APPROPRIATE)
ADLS_ACUITY_SCORE: 38

## 2022-01-01 ASSESSMENT — COLUMBIA-SUICIDE SEVERITY RATING SCALE - C-SSRS
4. HAVE YOU HAD THESE THOUGHTS AND HAD SOME INTENTION OF ACTING ON THEM?: NO
2. HAVE YOU ACTUALLY HAD ANY THOUGHTS OF KILLING YOURSELF IN THE PAST MONTH?: NO
6. HAVE YOU EVER DONE ANYTHING, STARTED TO DO ANYTHING, OR PREPARED TO DO ANYTHING TO END YOUR LIFE?: NO
3. HAVE YOU BEEN THINKING ABOUT HOW YOU MIGHT KILL YOURSELF?: NO
5. HAVE YOU STARTED TO WORK OUT OR WORKED OUT THE DETAILS OF HOW TO KILL YOURSELF? DO YOU INTEND TO CARRY OUT THIS PLAN?: NO
1. IN THE PAST MONTH, HAVE YOU WISHED YOU WERE DEAD OR WISHED YOU COULD GO TO SLEEP AND NOT WAKE UP?: NO

## 2022-06-01 PROBLEM — R62.7 FAILURE TO THRIVE IN ADULT: Status: ACTIVE | Noted: 2022-01-01

## 2022-06-01 PROBLEM — F03.91 DEMENTIA WITH BEHAVIORAL DISTURBANCE, UNSPECIFIED DEMENTIA TYPE: Status: ACTIVE | Noted: 2022-01-01

## 2022-06-01 NOTE — ED NOTES
Patient was very agitated upon ED arrival, been improving since Zyprexa odt given. Home health nurse accompanied patient to ED was very helpful at redirecting her.

## 2022-06-01 NOTE — ED NOTES
[facility]  ED Nurse Handoff Report    ED Chief complaint: Needs social work service or placement      ED Diagnosis:   Final diagnoses:   Dementia with behavioral disturbance, unspecified dementia type (H)   Failure to thrive in adult       Code Status: Full Code    Allergies: No Known Allergies    Patient Story: social admission  Focused Assessment:  Patient started to have home care service yesterday and volnerable adult was filed due to her poor living condition , today home health nurse was at the house and found patient had rotten food in the fridge, fecal matters all over the house, patient was agitated and confused. Here for social admission and placement to memory care unit.     Treatments and/or interventions provided: Zyprexa 5 mg ODT  Patient's response to treatments and/or interventions: agitation improved    To be done/followed up on inpatient unit:  close monitor and emotional support    Does this patient have any cognitive concerns?: confused    Activity level - Baseline/Home:  Independent  Activity Level - Current:   Independent    Patient's Preferred language: English   Needed?: No    Isolation: None  Infection: Not Applicable  Patient tested for COVID 19 prior to admission: YES  Bariatric?: No    Vital Signs:   Vitals:    06/01/22 1622 06/01/22 1623 06/01/22 1625 06/01/22 1627   BP: 137/85  137/85    Pulse: 73  73    Resp:   20 16   Temp:   97.5  F (36.4  C)    TempSrc:   Temporal    SpO2:  100% 100%    Weight:   58.5 kg (129 lb)        Cardiac Rhythm:     Was the PSS-3 completed:   Yes  What interventions are required if any?               Family Comments: Daughter estranged herself,  Sister in [place] is POA  OBS brochure/video discussed/provided to patient/family: Yes              Name of person given brochure if not patient: na              Relationship to patient: na    For the majority of the shift this patient's behavior was Green.   Behavioral interventions  performed were none.    ED NURSE PHONE NUMBER: *[phone]

## 2022-06-01 NOTE — LETTER
Medical Records  [facility]  Nino Townsend  Social Work  [phone]

## 2022-06-01 NOTE — H&P
INTERNAL MEDICINE H&P    H&P dictated:  #[id number]      Wild An Jr., MD  [phone] (p)  Text Page  Radha

## 2022-06-01 NOTE — ED TRIAGE NOTES
"Arrives via EMS from [facility]. Home health nurse made visit to patients home and found the patient unable to care for herself. Hose filled with rotting food, feces spread all over. Home-care nurse brought patient to clinic. MD evaluated and decided that patient has \"severe dementia\" per HOLD form. Directed patient to be brought to ED for evaluation and placement.     Triage Assessment     Row Name 06/01/22 5625       Triage Assessment (Adult)    Airway WDL WDL       Respiratory WDL    Respiratory WDL WDL       Skin Circulation/Temperature WDL    Skin Circulation/Temperature WDL WDL       Cardiac WDL    Cardiac WDL WDL       Peripheral/Neurovascular WDL    Peripheral Neurovascular WDL WDL       Cognitive/Neuro/Behavioral WDL    Cognitive/Neuro/Behavioral WDL WDL              "

## 2022-06-01 NOTE — ED PROVIDER NOTES
"  History     Chief Complaint:  Dementia    HPI   Mariposa Mendez is a 77 year old female who presents with dementia and inability to care for herself.  Patient is currently living alone and has nursing visits a couple times a week set up by her sister/power of  (Marian; lives in [place]).  [place] [facility]veled home with rotting food in the fridge and feces spread all over.  Patient was admitted to [facility] at the end of 2020 and was recommended TCU placement for dementia; at that time, per documentation, patient's brother Karthik (power of  at that time) elected to defer placement.  Currently patient is without significant complaint and is agitated on interview stating that she \"just wants to go home\".  She presented to primary care doctor's office today, was placed on a health officer hold, and transferred to the ED for further evaluation and placement.    ROS:  Review of Systems   Unable to perform ROS: Dementia     Allergies:  No Known Allergies     Medications:    acetaminophen (TYLENOL) 325 MG tablet  ASPIRIN 81 MG OR TABS  buPROPion (WELLBUTRIN) 75 MG tablet  FLUOXETINE HCL PO  LEVOTHYROXINE SODIUM PO  Multiple Vitamins-Minerals (EMERGEN-C VITAMIN C) PACK  order for DME  TRAZODONE HCL PO  VITAMIN D, CHOLECALCIFEROL, PO  VITAMIN E NATURAL PO        Past Medical History:    Past Medical History:   Diagnosis Date     Depressive disorder, not elsewhere classified      Generalized osteoarthrosis, unspecified site      OSTEOPENIA OF HIP      Other and unspecified hyperlipidemia      Unspecified hypothyroidism        Past Surgical History:    Past Surgical History:   Procedure Laterality Date     SURGICAL HISTORY OF -   age 39 or 40    SUSANNAH and unilateral ooph (fibroids)     SURGICAL HISTORY OF -   12-8-2005    right foot surgery with hardware        Family History:    family history includes Arthritis in her maternal grandfather and mother; " Breast Cancer in her mother; Family History Negative in her daughter; Hypertension in her mother; Lipids in her mother, sister, and sister; Psychotic Disorder in her brother, mother, sister, and sister; Thyroid Disease in her sister.    Social History:  Lives alone  PCP: Patria Shea     Physical Exam     Patient Vitals for the past 24 hrs:   BP Temp Temp src Pulse Resp SpO2 Weight   06/01/22 1920 -- -- -- -- -- 98 % --   06/01/22 1627 -- -- -- -- 16 -- --   06/01/22 1625 137/85 97.5  F (36.4  C) Temporal 73 20 100 % 58.5 kg (129 lb)   06/01/22 1623 -- -- -- -- -- 100 % --   06/01/22 1622 137/85 -- -- 73 -- -- --        Physical Exam  General: Patient in mild distress.  Alert and cooperative with exam.  Baseline mentation; history of dementia  HEENT: NC/AT. Conjunctiva without injection or scleral icterus. External ears normal.  Respiratory: Breathing comfortably on room air  CV: Normal rate, all extremities well perfused  GI:  Non-distended abdomen  Skin: Warm, dry, no rashes/open wounds on exposed skin  Musculoskeletal: No obvious deformities  Neuro: Alert, answers questions appropriately. No gross motor deficits.  Oriented to person only        Emergency Department Course     Laboratory:  Labs Ordered and Resulted from Time of ED Arrival to Time of ED Departure   TSH WITH FREE T4 REFLEX - Abnormal       Result Value    TSH 29.12 (*)    T4 FREE - Abnormal    Free T4 0.69 (*)    BASIC METABOLIC PANEL - Normal    Sodium 140      Potassium 3.5      Chloride 109      Carbon Dioxide (CO2) 25      Anion Gap 6      Urea Nitrogen 16      Creatinine 0.76      Calcium 9.2      Glucose 90      GFR Estimate 80     COVID-19 VIRUS (CORONAVIRUS) BY PCR - Normal    SARS CoV2 PCR Negative     CBC WITH PLATELETS AND DIFFERENTIAL    WBC Count 7.8      RBC Count 4.79      Hemoglobin 14.5      Hematocrit 43.8      MCV 91      MCH 30.3      MCHC 33.1      RDW 12.6      Platelet Count 296      % Neutrophils 62      % Lymphocytes 28       % Monocytes 7      % Eosinophils 2      % Basophils 1      % Immature Granulocytes 0      NRBCs per 100 WBC 0      Absolute Neutrophils 4.9      Absolute Lymphocytes 2.2      Absolute Monocytes 0.5      Absolute Eosinophils 0.2      Absolute Basophils 0.1      Absolute Immature Granulocytes 0.0      Absolute NRBCs 0.0     ROUTINE UA WITH MICROSCOPIC       Emergency Department Course:    Reviewed:  I reviewed nursing notes, vitals and past medical history    Interventions:  Medications   OLANZapine zydis (zyPREXA) ODT tab 5 mg (5 mg Oral Given 6/1/22 1651)        Disposition:  The patient was admitted to the observation unit under the care of Dr. An.     Impression & Plan      Medical Decision Making:  Patient is a 77-year-old female who presents with failure to thrive and dementia; concern for her ability to continue living safely at home.  Patient's medical history and records were reviewed.  On evaluation patient has no focal neurologic deficit or complaint.  Basic labs obtained and unremarkable.  Later T4 and TSH was checked by hospitalist service and TSH was noted to be high with low T4; will defer management to the inpatient team.  I discussed her care with her sister/power of , Marian.  Patient will be admitted to the observation unit as she will need placement in skilled care facility.  Presentation consistent with advancing dementia.  She was noted to be agitated during ED course requiring Zyprexa.  Patient remained stable throughout my care.    Diagnosis:    ICD-10-CM    1. Dementia with behavioral disturbance, unspecified dementia type (H)  F03.91    2. Failure to thrive in adult  R62.7          Ramone Taylor,   06/01/22 2051

## 2022-06-02 NOTE — PROGRESS NOTES
Observation goals  PRIOR TO DISCHARGE        Comments:   -diagnostic tests and consults completed and resulted: not met   -vital signs normal or at patient baseline: met   -tolerating oral intake to maintain hydration: met   -safe disposition plan has been identified: not met   Nurse to notify provider when observation goals have been met and patient is ready for discharge.

## 2022-06-02 NOTE — PROGRESS NOTES
Observation goals  PRIOR TO DISCHARGE       Comments:   -diagnostic tests and consults completed and resulted - Not met   -vital signs normal or at patient baseline- Not met    -tolerating oral intake to maintain hydration- Not met    -safe disposition plan has been identified- Not met      Pt refused Blood Glucose check

## 2022-06-02 NOTE — PROVIDER NOTIFICATION
MD Notification    Notified Person: MD    Notified Person Name: Dr. Mclean     Notification Date/Time: 6/2/22 @1035    Notification Interaction: [employer] messaging     Purpose of Notification: FYI, UA results are back     Orders Received: rocephin, urine cultures    Comments:.

## 2022-06-02 NOTE — PROVIDER NOTIFICATION
MD Notification    Notified Person: MD    Notified Person Name: Dr. Wild An     Notification Date/Time:    Notification Interaction:    Purpose of Notification: Short stay Room 514  Pt is angry and not cooperative.  Please add PRN meds   Thank you   Astrid PINEDA     Orders Received:    Comments: PRN Zyprexa 2.5mg Every 6hrs PRN

## 2022-06-02 NOTE — PROGRESS NOTES
RECEIVING UNIT ED HANDOFF REVIEW    ED Nurse Handoff Report was reviewed by: Yair Gooden RN on June 1, 2022 at 9:16 PM

## 2022-06-02 NOTE — H&P
"Admitted: 06/01/2022    PRIMARY CARE PROVIDER:  Patria Shea MD    CHIEF COMPLAINT:  Dementia with failure to thrive at home.    HISTORY OF PRESENT ILLNESS:  Ms. Mariposa Mendez is a 77-year-old female patient with history noted below including dementia, hypothyroidism and depression/anxiety, who presents with the above issues.  The patient currently lives alone and has nursing visits a few times a week set up by her sister, who is her power of , but lives in [place].  Yesterday nursing staff noted that the patient was disheveled and her home was in disarray with rotting food in the refrigerator and there was apparently feces \"spread all over.\"  Given these issues, she was arranged to see her primary care provider, which she did today, and primary care provider subsequently placed her on a hold and sent to [facility] for evaluation and safe dispostion.    The patient was seen in the ER by Dr. Taylor.  Vital signs showed temperature 97.5, heart rate 73, blood pressure 137/85, respiration rate 20, O2 saturation 100%.  Laboratory evaluation showed a normal BMP, normal CBC.  She was agitated in the ER and did require a dose of olanzapine, which has helped.  Discussion was made with the patient's sister who is the patient's power of  in [place] and ultimately felt that she wanted the patient placed into a memory care unit or higher level of care.  It was not felt the patient will be safe to go home at this time.  As such, request for observation admission was made.    The patient presently is calm and cooperative.  Does note some left ankle pain.  Otherwise offers no complaints at this time and review of systems was felt to be unreliable due to her dementia.    PAST MEDICAL HISTORY:  1.  Dementia.  Noted that in 2020 she had a long hospitalization at [facility] for issues including dementia and placement to TCU was planned at that time, but apparently at the last minute, was ultimately decided " to take the patient home (the decision was made by her brother, Isaac, who was power of  at that time).    2.  Hypothyroidism.  3.  Depression/anxiety.    PAST SURGICAL HISTORY:  1.  Status post right foot surgery with hardware in 2005.  2.  Status post total abdominal hysterectomy and unilateral oophorectomy for fibroids when she was 39 or 40 years old.    SOCIAL HISTORY:  The patient smoked half pack per day for 30 years, quit in 2005.  Does admit to drinking alcohol socially.  She is .  She has 1 daughter.  Says she is retired and could not tell me what she used to do for employment.    FAMILY HISTORY:  Reviewed and not felt to be contributory.    REVIEW OF SYSTEMS:  As noted in the HPI.  Otherwise, not felt to be reliable given her dementia.    PHYSICAL EXAMINATION:    VITAL SIGNS:  Temperature 97.5, heart rate 73, blood pressure 137/85, respiration rate 16, O2 saturation 100%.    GENERAL:  This is a 77-year-old female patient lying in bed.  She is alert, calm and cooperative.  HEENT:  Pupils equal, round, and reactive.  No scleral icterus or conjunctival injection.  Oropharynx -- no gross erythema.    NECK:  No bruits, JVD or adenopathy.    HEART:  Regular rate and rhythm without murmurs, rubs, or gallops.  LUNGS:  Diminished at bases.  No crackles or wheezes.  ABDOMEN:  Soft, nontender, nondistended, positive bowel sounds.  No femoral bruits.  EXTREMITIES:  Show no pitting edema.  Left ankle is tender, but not swollen.  No pain with passive motion.  NEUROLOGIC:  Exam reveals no gross focal motor or sensory deficits.    LABORATORY DATA AND IMAGING:    Results for orders placed or performed during the hospital encounter of 06/01/22   Basic metabolic panel     Status: Normal   Result Value Ref Range    Sodium 140 133 - 144 mmol/L    Potassium 3.5 3.4 - 5.3 mmol/L    Chloride 109 94 - 109 mmol/L    Carbon Dioxide (CO2) 25 20 - 32 mmol/L    Anion Gap 6 3 - 14 mmol/L    Urea Nitrogen 16 7 - 30 mg/dL     Creatinine 0.76 0.52 - 1.04 mg/dL    Calcium 9.2 8.5 - 10.1 mg/dL    Glucose 90 70 - 99 mg/dL    GFR Estimate 80 >60 mL/min/1.73m2   CBC with platelets and differential     Status: None   Result Value Ref Range    WBC Count 7.8 4.0 - 11.0 10e3/uL    RBC Count 4.79 3.80 - 5.20 10e6/uL    Hemoglobin 14.5 11.7 - 15.7 g/dL    Hematocrit 43.8 35.0 - 47.0 %    MCV 91 78 - 100 fL    MCH 30.3 26.5 - 33.0 pg    MCHC 33.1 31.5 - 36.5 g/dL    RDW 12.6 10.0 - 15.0 %    Platelet Count 296 150 - 450 10e3/uL    % Neutrophils 62 %    % Lymphocytes 28 %    % Monocytes 7 %    % Eosinophils 2 %    % Basophils 1 %    % Immature Granulocytes 0 %    NRBCs per 100 WBC 0 <1 /100    Absolute Neutrophils 4.9 1.6 - 8.3 10e3/uL    Absolute Lymphocytes 2.2 0.8 - 5.3 10e3/uL    Absolute Monocytes 0.5 0.0 - 1.3 10e3/uL    Absolute Eosinophils 0.2 0.0 - 0.7 10e3/uL    Absolute Basophils 0.1 0.0 - 0.2 10e3/uL    Absolute Immature Granulocytes 0.0 <=0.4 10e3/uL    Absolute NRBCs 0.0 10e3/uL   Asymptomatic COVID-19 Virus (Coronavirus) by PCR Nasopharyngeal     Status: Normal    Specimen: Nasopharyngeal; Swab   Result Value Ref Range    SARS CoV2 PCR Negative Negative    Narrative    Testing was performed using the Xpert Xpress SARS-CoV-2 Assay on the   [employer]. Additional information about   this Emergency Use Authorization (EUA) assay can be found via the Lab   Guide. This test should be ordered for the detection of SARS-CoV-2 in   individuals who meet SARS-CoV-2 clinical and/or epidemiological   criteria. Test performance is unknown in asymptomatic patients. This   test is for in vitro diagnostic use under the FDA EUA for   laboratories certified under CLIA to perform high complexity testing.   This test has not been FDA cleared or approved. A negative result   does not rule out the presence of PCR inhibitors in the specimen or   target RNA in concentration below the limit of detection for the   assay. The possibility  "of a false negative should be considered if   the patient's recent exposure or clinical presentation suggests   COVID-19. This test was validated by the [facility]. This laboratory is certified under the Clinical Laboratory Improvement Amendments of 1988 (CLIA-88) as qualified to perform high complexity laboratory testing.     TSH with free T4 reflex     Status: Abnormal   Result Value Ref Range    TSH 29.12 (H) 0.40 - 4.00 mU/L   T4 free     Status: Abnormal   Result Value Ref Range    Free T4 0.69 (L) 0.76 - 1.46 ng/dL   CBC with platelets differential     Status: None    Narrative    The following orders were created for panel order CBC with platelets differential.  Procedure                               Abnormality         Status                     ---------                               -----------         ------                     CBC with platelets and d...[[id number]]                      Final result                 Please view results for these tests on the individual orders.             ASSESSMENT AND PLAN:    Ms. Mariposa Mendez is a 77-year-old female patient with history including dementia, hypothyroidism and depression/anxiety, who presents to [facility] with failure to thrive.    Upon home nurse visit on the day prior to presentation, the patient was noted to be disheveled and her home in disarray with rotted food in the refrigerator and feces \"spread all over.\"  She was arranged to see her primary care provider 06/01/2022 and there she was placed on a hold and sent to [facility] for evaluation and safe disposition.    On initial evaluation here, she was afebrile and hemodynamically stable.  Labs were unremarkable.       Failure to thrive due to dementia.    * The patient lives home alone.  She has a home nurse visit a few times a week, set her up by her sister, who is her power of  and lives in [place].  Noted that in 2020 she had a long hospitalization at " [facility] for issues including dementia and placement to TCU was planned at that time, but apparently at the last minute, was ultimately decided to take the patient home (the decision was made by her brother, Isaac, who was power of  at that time).   * Initial presentation as above.  Current POA, sister, wanted the patient in a higher level of care.  - At this time we will admit under observation.   - Social Work will be consulted.  - Management of dementia as below.    She was given olanzapine and now is much improved.   Dementia with behavioral disturbance (agitation).    Depression/anxiety.  * Patient with known history of dementia.  * She became agitated in the ER once it was told her that she had come to the hospital.  She was given olanzapine with improvement.   - Continue prior to admission bupropion, fluoxetine and trazodone.  - Will order delirium protocol with p.r.n., quetiapine and p.r.n. haloperidol.    - Psychiatry consult.  - Re-orient as needed.  - Maintain normal day/night, sleep/wake cycles.  - Minimize sedating medications as able.    Hypothyroidism.    - We will check a TSH level.  - Continue levothyroxine.      PROPHYLAXIS:  Pneumoboots and ambulation.    CODE STATUS:  Full code.      Wild An Jr., MD        D: 2022   T: 2022   MT: [id number]    Name:     DARIAN ANDUJAR  MRN:      [id number]        Account:     [id number]   :      1945           Admitted:    2022       Document: [id number]    cc:  Patria Shea MD

## 2022-06-02 NOTE — PHARMACY-ADMISSION MEDICATION HISTORY
Pharmacy Medication History  Admission medication history interview status for the 6/1/2022  admission is complete. See EPIC admission navigator for prior to admission medications     Location of Interview: Phone  Medication history sources: Patient's family/friend (Her sister/Marian @ [phone])    Significant changes made to the medication list:  Removed ASA  Of note: per Marian, she has not taken any of her meds for last month.    In the past week, patient estimated taking medication this percent of the time: less than 50% due to other    Additional medication history information:       Medication reconciliation completed by provider prior to medication history? No    Time spent in this activity: 20min    Prior to Admission medications    Medication Sig Last Dose Taking? Auth Provider   acetaminophen (TYLENOL) 325 MG tablet Take 2 tablets (650 mg) by mouth every 4 hours as needed for mild pain  Yes Esau Greene MD, MD   buPROPion (WELLBUTRIN) 75 MG tablet Take 150 mg by mouth daily  Yes Unknown, Entered By History   cyanocobalamin (VITAMIN B-12) 1000 MCG tablet Take 1,000 mcg by mouth daily  Yes Unknown, Entered By History   FLUOXETINE HCL PO Take 10 mg by mouth daily  Yes Unknown, Entered By History   LEVOTHYROXINE SODIUM PO Take 100 mcg by mouth daily  Yes Unknown, Entered By History   Multiple Vitamins-Minerals (EMERGEN-C VITAMIN C) PACK Take 1 packet by mouth daily  Yes Unknown, Entered By History   TRAZODONE HCL PO Take 100 mg by mouth At Bedtime (Takes 1.5 x 100mg tablet for a total of 150mg at bedtime)  Yes Unknown, Entered By History   VITAMIN D, CHOLECALCIFEROL, PO Take 1,000 Units by mouth daily   Yes Unknown, Entered By History   VITAMIN E NATURAL PO Take 400 Units by mouth daily   Yes Unknown, Entered By History   order for DME Equipment being ordered: [employer] ()  Treatment Diagnosis: Difficulty with gait due to LE weakness and unable to hold walker with right hand   Ramona  Esau ENRIQUE MD, MD       The information provided in this note is only as accurate as the sources available at the time of update(s)
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
## 2022-06-02 NOTE — PLAN OF CARE
"Orientation/Cognitive: Alert to self only  Observation Goals (Met/ Not Met): not met  Mobility Level/Assist Equipment: SBA with GB and walker, refuses GB and walker at times, ambulated independently in room, willing to use walker in hallway only. Door alarm on  Fall Risk (Y/N): yes  Behavior Concerns: irritable and confused  Pain Management: denies pain  Tele/VS/O2: VSS on RA  ABNL Lab/BG: UA results-MD aware  Diet: regular  Bowel/Bladder: continent   Skin Concerns: none-refuses skin assessment   Drains/Devices: IV-SL  Tests/Procedures for next shift: SW/CM consults   Anticipated DC date & active delays: TBD  Patient Stated Goal for Today: \"go home\"    "

## 2022-06-02 NOTE — UTILIZATION REVIEW
"Concurrent stay review; Secondary Review Determination    Under the authority of the Utilization Management Committee, the utilization review process indicated a secondary review on the above patient. The review outcome is based on review of the medical records, discussions with staff, and applying clinical experience noted on the date of the review.    (x) Observation Status Appropriate - Concurrent stay review    RATIONALE FOR DETERMINATION    Ms. Mariposa Mendez is a 77-year-old female patient with history noted below including dementia, hypothyroidism and depression/anxiety, who presents with the above issues.  The patient was living alone and had nursing visits a few times a week set up by her sister, who is her power of , but lives in [place].  The day before presentation nursing staff noted that the patient was disheveled and her home was in disarray with rotting food in the refrigerator and there was apparently feces \"spread all over.\"  Given these issues, she was arranged to see her primary care provider, which she did on the day off presentation, summarily being placed on a hold and sent to the [facility] ED for further evaluation. In the ED vital signs and labs were unremarkable. She required some olanzapine for agitation.  Patient's sister who is her power of  wanted admission for placement in a memory care unit for higher level of care.  Patient was admitted observation. Social work has been consulted for placement.     Patient is clinically improving and there is no clear indication to change patient's status to inpatient. The severity of illness, intensity of service provided, expected LOS and risk for adverse outcome make the care appropriate for observation.    This document was produced using voice recognition software    The information on this document is developed by the utilization review team in order for the business office to ensure compliance. This only denotes the appropriateness of " proper admission status and does not reflect the quality of care rendered.    The definitions of Inpatient Status and Observation Status used in making the determination above are those provided in the CMS Coverage Manual, Chapter 1 and Chapter 6, section 70.4.    Sincerely,    Rodriguez Gonsales MD     Utilization Review    Physician Advisor    [facility].

## 2022-06-02 NOTE — PLAN OF CARE
Goal Outcome Evaluation:  Orientation/Cognitive: A&O to self, Confused   Observation Goals (Met/ Not Met): Not met   Mobility Level/Assist Equipment: Ax1 GB/Walker   Fall Risk (Y/N): Yes   Behavior Concerns: Pt is not cooperative   Pain Management: Denies pain   Tele/VS/O2: VSS on RA   ABNL Lab/BG: TSH- 29.12, T4 free- 0.69    Diet: Regular   Bowel/Bladder: Incontinent   Skin Concerns: Scattered   Drains/Devices: None  Tests/Procedures for next shift: Psychiatry and CM/SW consult. UA needs to collect   Anticipated DC date & active delays: TBD   Patient Stated Goal for Today: JANICE     Observation goals  PRIOR TO DISCHARGE       Comments:   -diagnostic tests and consults completed and resulted - Not met   -vital signs normal or at patient baseline- Not met    -tolerating oral intake to maintain hydration- Not met    -safe disposition plan has been identified- Not met

## 2022-06-02 NOTE — PROGRESS NOTES
"[facility]    Medicine Progress Note - Hospitalist Service    Date of Admission:  6/1/2022  Ms. Mariposa Mendez is a 77-year-old female patient with history including dementia, hypothyroidism and depression/anxiety, who presents to [facility] with failure to thrive.     Upon home nurse visit on the day prior to presentation, the patient was noted to be disheveled and her home in disarray with rotted food in the refrigerator and feces \"spread all over.\"  She was arranged to see her primary care provider 06/01/2022 and there she was placed on a hold and sent to [facility] for evaluation and safe disposition.    Assessment & Plan        Failure to thrive due to dementia.    * The patient lives at home alone.  She has a home nurse visit a few times a week, set her up by her sister, who is her power of  and lives in [place].  Noted that in 2020 she had a long hospitalization at [facility] for issues including dementia and placement to TCU was planned at that time, but apparently at the last minute, was ultimately decided to take the patient home (the decision was made by her brother, Isaac, who was power of  at that time).   * Initial presentation as above.  Current POA, sister, wanted the patient in a higher level of care.    Social Work consult requested    Management of dementia as below.     She was given olanzapine and now is much improved.   Dementia with behavioral disturbance (agitation).    Depression/anxiety.  * Patient with known history of dementia.  * She became agitated in the ER once it was told her that she had come to the hospital.  She was given olanzapine with improvement.     Continue prior to admission bupropion, fluoxetine and trazodone.    Will order delirium protocol with p.r.n., quetiapine and p.r.n. haloperidol.      Psychiatry consult.    Re-orient as needed.    Maintain normal day/night, sleep/wake cycles.    Minimize sedating medications as " able.     Hypothyroidism.      TSH is elevated (29), free T4 is low, consistent with hypothyroidism    Home medication list includes levothyroxine at a reasonable dose; question compliance.  Pharmacy notes that last filled a 90-day supply of Synthroid on 11/5/2021.    Resume Synthroid 100 mcg daily; if she tolerates, could increase dose incrementally, soon        Diet: Regular Diet Adult    DVT Prophylaxis: Pneumatic Compression Devices  Del Angel Catheter: Not present  Central Lines: None  Cardiac Monitoring: None  Code Status: Full Code      Disposition Plan   Expected Discharge: 06/03/2022  Anticipated discharge location:  Awaiting care coordination huddle  Delays:     pending placement        The patient's care was discussed with the Bedside Nurse and Patient.    Heidy Mclean MD  Hospitalist Service  [facility]  Securely message with the Vocera Web Console (learn more here)  Text page via [email or web address] Paging/Directory       Clinically Significant Risk Factors Present on Admission                # Platelet Defect: home medication list includes an antiplatelet medication       ______________________________________________________________________    Interval History   Patient was unavailable despite 2 visits.  Her daughter and granddaughter were visiting, patient was taking a shower, had been up in the room.  There are no new new respiratory or GI issues noted.    Data reviewed today: I reviewed all medications, new labs and imaging results over the last 24 hours. I personally reviewed no images or EKG's today.    Physical Exam   Vital Signs: Temp: 97.4  F (36.3  C) Temp src: Oral BP: 108/69 Pulse: 81   Resp: 16 SpO2: 99 % O2 Device: None (Room air)    Weight: 137 lbs 0 oz  Exam deferred, see above, patient unavailable despite 2 visits    Data   Recent Labs   Lab 06/01/22  1655   WBC 7.8   HGB 14.5   MCV 91         POTASSIUM 3.5   CHLORIDE 109   CO2 25   BUN 16   CR 0.76   ANIONGAP  6   JAYE 9.2   GLC 90     No results found for this or any previous visit (from the past 24 hour(s)).  Medications

## 2022-06-03 NOTE — PLAN OF CARE
Goal Outcome Evaluation:           Date/Time:06/03,0700-15:30     Orientation/Cognitive: A&O to self, Confused   Observation Goals (Met/ Not Met): Not met   Mobility Level/Assist Equipment: SBA/Walker   Fall Risk (Y/N): Yes   Behavior Concerns: Pt is not cooperative @times  Pain Management: Denies pain   Tele/VS/O2: VSS on RA   ABNL Lab/BG: na  Diet: Regular   Bowel/Bladder: Incontinent  urine at times  Skin Concerns: Scattered   Drains/Devices: None  Tests/Procedures for next shift: Psychiatry and CM/SW following. UA + on rocephin.  Anticipated DC date & active delays: TBD   Patient Stated Goal for Today: JANICE   Pt has no iv access and refused the writer to place a new one. MD notified, told will change to oral abx. Pt up ambulating on the hallway with sba/walker. Takes multiple attempt for pt to take her medications. Pt is on long arm.  Will continue to monitor.Kemal aWgoner RN    Observation goals  PRIOR TO DISCHARGE        Comments:   -diagnostic tests and consults completed and resulted: not met      -vital signs normal or at patient baseline: met      -tolerating oral intake to maintain hydration: met      -safe disposition plan has been identified: not met      Nurse to notify provider when observation goals have been met and patient is ready for discharge.

## 2022-06-03 NOTE — PROGRESS NOTES
"[facility]    Medicine Progress Note - Hospitalist Service    Date of Admission:  6/1/2022  Ms. Mariposa Mendez is a 77-year-old female patient with history including dementia, hypothyroidism and depression/anxiety, who presents to [facility] with failure to thrive.     Upon home nurse visit on the day prior to presentation, the patient was noted to be disheveled and her home in disarray with rotted food in the refrigerator and feces \"spread all over.\"  She was arranged to see her primary care provider 06/01/2022 and there she was placed on a hold and sent to [facility] for evaluation and safe disposition.    Assessment & Plan        Failure to thrive due to dementia  * The patient lives at home alone.  She has a home nurse visit a few times a week, set her up by her sister, who is her power of  and lives in [place].  Noted that in 2020 she had a long hospitalization at [facility] for issues including dementia and placement to TCU was planned at that time, but apparently at the last minute, was ultimately decided to take the patient home (the decision was made by her brother, Isaac, who was power of  at that time).   * Initial presentation as above.  Current POA, sister, wanted the patient in a higher level of care.    Social Work consult requested    Management of dementia as below.     She was given olanzapine and now is much improved.   Dementia with behavioral disturbance (agitation).    Depression/anxiety.  * Patient with known history of dementia.  * She became agitated in the ER once it was told her that she had come to the hospital.  She was given olanzapine with improvement.     Psychiatry consult, I appreciate Amish Mendoza's evaluation and recommendations    Per psychiatry, \"hold fluoxetine and bupropion as it is not clear if patient has been taking these and they can be activating.\"    Restart trazodone 100 mg at bedtime for insomnia    Olanzapine 5 mg " every 6 hours p.o. as needed for acute agitation.  If patient refuses or is unable to take oral medications and is severely agitated, can use olanzapine 5 mg IM.  Can try quetiapine 12.5 mg if olanzapine is ineffective    Add Aricept 5 mg daily for dementia and to possibly help reduce agitation    In the ED, patient was placed on a 72-hour hold.  Defer to psychiatry whether to continue or remove hold.    Routine delirium precautions as outlined in psychiatry notes    Re-orient as needed.    Maintain normal day/night, sleep/wake cycles.    Minimize sedating medications as able.     Hypothyroidism     TSH is elevated (29), free T4 is low, consistent with hypothyroidism    Home medication list includes levothyroxine at a reasonable dose; question compliance.  Pharmacy notes that last filled a 90-day supply of Synthroid on 11/5/2021.    Resume Synthroid 100 mcg daily; if she tolerates, could increase dose incrementally, soon     Pyuria    Routine UA obtained in ED due to confusion in elderly patient (I.e. no clear urologic symptoms such as burning, urgency, frequency)    UA has 19 WBCs, large leukocyte esterase    UC is pending    Continue empiric treatment with Ceftraxone for now     Diet: Regular Diet Adult    DVT Prophylaxis: Pneumatic Compression Devices  Del Angel Catheter: Not present  Central Lines: None  Cardiac Monitoring: None  Code Status: Full Code      Disposition Plan   Expected Discharge: 06/04/2022  Anticipated discharge location:  Awaiting care coordination huddle  Delays:     Placement - LTC   pending placement        The patient's care was discussed with the Bedside Nurse and Patient.    Heidy Mclean MD  Hospitalist Service  [facility]  Securely message with the [employer] Web Console (learn more here)  Text page via [employer] Paging/Directory       Clinically Significant Risk Factors Present on Admission                   "    ______________________________________________________________________    Interval History   \"Well, I'm here.\"  Mraiposa is upset, asks if she can call her relatives.  (I gave her the telephone and showed her how to use it.) Says she didn't know she was going to the hospital.  Offers no new respiratory or GI complaints.    Data reviewed today: I reviewed all medications, new labs and imaging results over the last 24 hours. I personally reviewed no images or EKG's today.    Physical Exam   Vital Signs: Temp: 97.3  F (36.3  C) Temp src: Axillary BP: 98/43 Pulse: 64   Resp: 18 SpO2: 95 % O2 Device: None (Room air)    Weight: 137 lbs 0 oz  Constitutional: Awake, alert  Respiratory: Clear to auscultation bilaterally, no crackles or wheezing  Cardiovascular: Regular rate and rhythm, normal S1 and S2, and no murmur noted  GI: Normal bowel sounds, soft, non-distended, non-tender  Skin/Integumen: No rash on exposed skin  Other: Mood is upset      Data   Recent Labs   Lab 06/01/22  1655   WBC 7.8   HGB 14.5   MCV 91         POTASSIUM 3.5   CHLORIDE 109   CO2 25   BUN 16   CR 0.76   ANIONGAP 6   JAYE 9.2   GLC 90     No results found for this or any previous visit (from the past 24 hour(s)).  Medications       [Held by provider] buPROPion  150 mg Oral Daily     cefTRIAXone  1 g Intravenous Q24H     cyanocobalamin  1,000 mcg Oral Daily     donepezil  5 mg Oral At Bedtime     [Held by provider] FLUoxetine  10 mg Oral Daily     levothyroxine  100 mcg Oral Daily     traZODone  100 mg Oral At Bedtime     "
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
## 2022-06-03 NOTE — PROGRESS NOTES
Observation goals  PRIOR TO DISCHARGE       Comments: -diagnostic tests and consults completed and resulted: met   -vital signs normal or at patient baseline: JANICE, pt refused   -tolerating oral intake to maintain hydration: met   -safe disposition plan has been identified: not met   Nurse to notify provider when observation goals have been met and patient is ready for discharge.

## 2022-06-03 NOTE — PROGRESS NOTES
SW: called daughter Hilda to complete consult and gather more information. SW received Vm and left a message requesting a call back.     GAYLA Castano    [facility]

## 2022-06-03 NOTE — PLAN OF CARE
Orientation/Cognitive: Alert to self   Observation Goals (Met/ Not Met): not met  Mobility Level/Assist Equipment: SBA w/ GB   Fall Risk (Y/N): yes  Behavior Concerns: Easily agitated   Pain Management: denies pain, nonverbal indicators absent   Tele/VS/O2: VSS on RA  ABNL Lab/BG: UA   Diet: regular  Bowel/Bladder: incontinent at times    Skin Concerns: refuses skin assessment   Drains/Devices: IV-SL  Tests/Procedures for next shift: SW/CM consults   Anticipated DC date & active delays: pending   Patient Stated Goal for Today: wants to go home     Observation goals  PRIOR TO DISCHARGE        Comments:   -diagnostic tests and consults completed and resulted: not met     -vital signs normal or at patient baseline: met     -tolerating oral intake to maintain hydration: met     -safe disposition plan has been identified: not met     Nurse to notify provider when observation goals have been met and patient is ready for discharge.

## 2022-06-04 NOTE — PLAN OF CARE
"Goal Outcome Evaluation:    Plan of Care Reviewed With: patient, durable power of      Orientation/Cognitive: a/o to self only. Needs constant redirection and reorientation  Observation Goals (Met/ Not Met): not met. Placement pending.   Mobility Level/Assist Equipment: up with SBA. Walker present but doesn't always use it.   Fall Risk (Y/N): yes  Behavior Concerns: Easily irritable and agitated. Non cooperative. Refusing Vitals and meds at times. Needs frequent attempts   Pain Management: denies pain  Tele/VS/O2: VSS. On RA  ABNL Lab/BG: none  Diet: regular diet. Needs help with meal orders.   Bowel/Bladder: uses bathroom. Pulls up on. Refusing hygiene care. Pt wants to do it self. Shuts the bathroom door on staff when attempt to help.   Skin Concerns: dry and flaky skin. Refused lotion or moisturize. Unable to assess coccyx area.   Drains/Devices: none. Pulled PIV out yesterday.   Tests/Procedures for next shift: psych re-consulted  Anticipated DC date & active delays: 1-2 days pending placement.   Patient Stated Goal for Today: \"I want to go home\"    1330 Family at the bedside now. Patient dtr and sister visited this afternoon.  Pt was washed and cleaned. New pull ups on.            "

## 2022-06-04 NOTE — PLAN OF CARE
Orientation/Cognitive: Alert to self   Observation Goals (Met/ Not Met): not met  Mobility Level/Assist Equipment: SBA w/ GB   Fall Risk (Y/N): yes  Behavior Concerns: Uncooperative at times   Pain Management: denies pain, nonverbal indicators absent   Tele/VS/O2: VSS on RA  ABNL Lab/BG: see results   Diet: regular  Bowel/Bladder: incontinent at times    Skin Concerns: refuses skin assessment   Drains/Devices: no PIV  Tests/Procedures for next shift: none   Anticipated DC date & active delays: pending   Patient Stated Goal for Today: wants to go home     Observation goals  PRIOR TO DISCHARGE       Comments: -diagnostic tests and consults completed and resulted: met   -vital signs normal or at patient baseline: JANICE, pt refused   -tolerating oral intake to maintain hydration: met   -safe disposition plan has been identified: not met   Nurse to notify provider when observation goals have been met and patient is ready for discharge.

## 2022-06-04 NOTE — PROGRESS NOTES
Observation goals  PRIOR TO DISCHARGE         Comments:     -diagnostic tests and consults completed and resulted: met     -vital signs normal or at patient baseline: met    -tolerating oral intake to maintain hydration: met     -safe disposition plan has been identified: not met     Nurse to notify provider when observation goals have been met and patient is ready for discharge.

## 2022-06-04 NOTE — PROGRESS NOTES
"[facility]    Medicine Progress Note - Hospitalist Service    Date of Admission:  6/1/2022  Ms. Mariposa Mendez is a 77-year-old female patient with history including dementia, hypothyroidism and depression/anxiety, who presents to [facility] with failure to thrive.     Upon home nurse visit on the day prior to presentation, the patient was noted to be disheveled and her home in disarray with rotted food in the refrigerator and feces \"spread all over.\"  She was arranged to see her primary care provider 06/01/2022 and there she was placed on a hold and sent to [facility] for evaluation and safe disposition.    Assessment & Plan        Failure to thrive due to dementia  * The patient lives at home alone.  She has a home nurse visit a few times a week, set her up by her sister, who is her power of  and lives in [place].  Noted that in 2020 she had a long hospitalization at [facility] for issues including dementia and placement to TCU was planned at that time, but apparently at the last minute, was ultimately decided to take the patient home (the decision was made by her brother, Isaac, who was power of  at that time).   * Initial presentation as above.  Current POA, sister, wanted the patient in a higher level of care.    Social Work consult requested    See social work note, family has been working on getting patient into [facility] care, they reviewed referral and would like to have a face-to-face meeting with the patient on Monday, 6/6    Management of dementia as below.    Dementia with behavioral disturbance (agitation).    Depression/anxiety.  * Patient with known history of dementia.  * She became agitated in the ER once it was told her that she had come to the hospital.  She was given olanzapine with improvement.     Psychiatry consult, I appreciate Amish Mendoza's evaluation and recommendations    Per psychiatry, \"hold fluoxetine and bupropion as it is not " "clear if patient has been taking these and they can be activating.\"    Restart trazodone 100 mg at bedtime for insomnia    Olanzapine 5 mg every 6 hours p.o. as needed for acute agitation.  If patient refuses or is unable to take oral medications and is severely agitated, can use olanzapine 5 mg IM.  Can try quetiapine 12.5 mg if olanzapine is ineffective    Add Aricept 5 mg daily for dementia and to possibly help reduce agitation    In the ED, patient was placed on a 72-hour hold.  Defer to psychiatry whether to continue or remove hold.  Reconsult requested.  Also see comments in social work note, \"[facility] hesitant about accepting if not back on her medication for bipolar depression.\"    Routine delirium precautions as outlined in psychiatry notes    Re-orient as needed.    Maintain normal day/night, sleep/wake cycles.    Minimize sedating medications as able.     Hypothyroidism     TSH is elevated (29), free T4 is low, consistent with hypothyroidism    Home medication list includes levothyroxine at a reasonable dose; question compliance.  Pharmacy notes that she last filled a 90-day supply of Synthroid on 11/5/2021.    Resume Synthroid 100 mcg daily; if she tolerates, could increase dose incrementally, soon     Pyuria    Routine UA obtained in ED due to confusion in elderly patient (I.e. no clear urologic symptoms such as burning, urgency, frequency)    UA has 19 WBCs, large leukocyte esterase    UC has 10-50,000 CFU/milliliter mixture of urogenital noam    Discontinue antibiotics since no convincing evidence of infection     Diet: Regular Diet Adult    DVT Prophylaxis: Pneumatic Compression Devices  Del Angel Catheter: Not present  Central Lines: None  Cardiac Monitoring: None  Code Status: Full Code      Disposition Plan   Expected Discharge: 06/06/2022  Anticipated discharge location: long-term care facility ([facility])    Delays:     Placement - LTC   pending placement        The patient's care was discussed with " "the Bedside Nurse and Patient.    Heidy Mclean MD  Hospitalist Service  [facility]  Securely message with the [employer] Web Console (learn more here)  Text page via [employer] Paging/Directory       Clinically Significant Risk Factors Present on Admission                      ______________________________________________________________________    Interval History   \"Oh, I fell, but it was outside, on the grass.\" Mariposa describes a fall from many months ago, says she did not injure herself.  She mentions her daughter, Efren, but acknowledges she cannot remember if she has other children.  She again asks if she can call her family, I gave her the telephone and again demonstrated how to use it.  Her sister's phone number is written on the white board.  Patient has no new respiratory or GI complaints.    Data reviewed today: I reviewed all medications, new labs and imaging results over the last 24 hours. I personally reviewed no images or EKG's today.    Physical Exam   Vital Signs:     BP: 132/81 Pulse: 74   Resp: 20 SpO2: 98 % O2 Device: None (Room air)    Weight: 137 lbs 0 oz  Constitutional: Awake, alert  Respiratory: Clear to auscultation bilaterally, no crackles or wheezing  Cardiovascular: Regular rate and rhythm, normal S1 and S2, and no murmur noted  GI: Normal bowel sounds, soft, non-distended, non-tender  Skin/Integumen: No rash on exposed skin (she will not let staff assist her with toileting or examine skin under her undergarment)  Other: Mood is labile, at times very pleasant and interactive, easily becomes upset      Data   Recent Labs   Lab 06/01/22  1655   WBC 7.8   HGB 14.5   MCV 91         POTASSIUM 3.5   CHLORIDE 109   CO2 25   BUN 16   CR 0.76   ANIONGAP 6   JAYE 9.2   GLC 90     No results found for this or any previous visit (from the past 24 hour(s)).  Medications       [Held by provider] buPROPion  150 mg Oral Daily     cyanocobalamin  1,000 mcg Oral Daily     " donepezil  5 mg Oral At Bedtime     [Held by provider] FLUoxetine  10 mg Oral Daily     levothyroxine  100 mcg Oral Daily     traZODone  100 mg Oral At Bedtime

## 2022-06-04 NOTE — PLAN OF CARE
Goal Outcome Evaluation:    Orientation/Cognitive: alert to self only   Observation Goals (Met/ Not Met): not met  Mobility Level/Assist Equipment: not met  Fall Risk (Y/N): Y  Behavior Concerns: mood labile, uncooperative at times  Pain Management: denies, PAINAD 0  Tele/VS/O2: refused VS  ABNL Lab/BG: none today  Diet: Regular  Bowel/Bladder: incontinent urine at times  Skin Concerns: refused skin assessment  Drains/Devices: no PIV  Tests/Procedures for next shift: none  Anticipated DC date & active delays: TBD pending placement  Patient Stated Goal for Today: walk in bar[place]    Observation goals  PRIOR TO DISCHARGE       Comments: -diagnostic tests and consults completed and resulted: met   -vital signs normal or at patient baseline: JANICE, pt refused   -tolerating oral intake to maintain hydration: met   -safe disposition plan has been identified: not met   Nurse to notify provider when observation goals have been met and patient is ready for discharge.

## 2022-06-04 NOTE — PROGRESS NOTES
Observation goals  PRIOR TO DISCHARGE       Comments: -diagnostic tests and consults completed and resulted: not met   -vital signs normal or at patient baseline: JANICE, pt refused   -tolerating oral intake to maintain hydration: met   -safe disposition plan has been identified: not met   Nurse to notify provider when observation goals have been met and patient is ready for discharge.

## 2022-06-04 NOTE — PROVIDER NOTIFICATION
MD Notification    Notified Person: MD    Notified Person Name: Dr. ESCUDERO     Notification Date/Time: 06/04/22 @ 05:09    Notification Interaction: pager     Purpose of Notification:  UC is resulted. Pt on Abx    Orders Received:    Comments:

## 2022-06-05 NOTE — PROGRESS NOTES
Observation goals  PRIOR TO DISCHARGE         Comments:     -diagnostic tests and consults completed and resulted: met     -vital signs normal or at patient baseline: pt refusing vitals    -tolerating oral intake to maintain hydration: met. Needs help with meal tray order     -safe disposition plan has been identified: not met     Nurse to notify provider when observation goals have been met and patient is ready for discharge.

## 2022-06-05 NOTE — PLAN OF CARE
Goal Outcome Evaluation:    Plan of Care Reviewed With: durable power of , sibling      Pt is a/o to self only. Flat affect. Easily irritable and agitated when attempted to provide cares.  Not cooperative. Refusing vitals and  meds. Refused labs. Up ambulating to the bathroom independently. Poor appetite. Psych consult pending. Sister is POA. Visiting this evening.

## 2022-06-05 NOTE — PLAN OF CARE
Goal Outcome Evaluation:    Called Psych Assessment consult line at approx 10:53am today with a request for Psych re-eval.

## 2022-06-05 NOTE — PROGRESS NOTES
"[facility]    Medicine Progress Note - Hospitalist Service    Date of Admission:  6/1/2022  Ms. Mariposa Mendez is a 77-year-old female patient with history including dementia, hypothyroidism and depression/anxiety, who presents to [facility] with failure to thrive.     Upon home nurse visit on the day prior to presentation, the patient was noted to be disheveled and her home in disarray with rotted food in the refrigerator and feces \"spread all over.\"  She was arranged to see her primary care provider 06/01/2022 and there she was placed on a hold and sent to [facility] for evaluation and safe disposition.    Assessment & Plan        Failure to thrive due to dementia  * The patient lives at home alone.  She has a home nurse visit a few times a week, set her up by her sister, who is her power of  and lives in [place].  Noted that in 2020 she had a long hospitalization at [facility] for issues including dementia and placement to TCU was planned at that time, but apparently at the last minute, was ultimately decided to take the patient home (the decision was made by her brother, Isaac, who was power of  at that time).   * Initial presentation as above.  Current POA, sister, wanted the patient in a higher level of care.    Social Work consult requested    See social work note, family has been working on getting patient into [facility] care, they reviewed referral and would like to have a face-to-face meeting with the patient on Monday, 6/6    Management of dementia as below.    Dementia with behavioral disturbance (agitation).    Depression/anxiety.  * Patient with known history of dementia.  * She became agitated in the ER once it was told her that she had come to the hospital.  She was given olanzapine with improvement.     Psychiatry consult, I appreciate Amish Mendoza's evaluation and recommendations    Per psychiatry, \"hold fluoxetine and bupropion as it is not " "clear if patient has been taking these and they can be activating.\"    Restart trazodone 100 mg at bedtime for insomnia    Olanzapine 5 mg every 6 hours p.o. as needed for acute agitation.  If patient refuses or is unable to take oral medications and is severely agitated, can use olanzapine 5 mg IM.  Can try quetiapine 12.5 mg if olanzapine is ineffective    Add Aricept 5 mg daily for dementia and to possibly help reduce agitation    In the ED, patient was placed on a 72-hour hold.  Defer to psychiatry whether to continue or remove hold.  Reconsult requested.     Psychiatry, please also see comments in social work note, \"[facility] hesitant about accepting if not back on her medication for bipolar depression.\"    Routine delirium precautions as outlined in psychiatry notes    Re-orient as needed.    Maintain normal day/night, sleep/wake cycles.    Minimize sedating medications as able.     Hypothyroidism     TSH is elevated (29), free T4 is low, consistent with hypothyroidism    Home medication list includes levothyroxine at a reasonable dose; question compliance.  Pharmacy notes that she last filled a 90-day supply of Synthroid on 11/5/2021.    Resume Synthroid 100 mcg daily; if she tolerates, could increase dose incrementally, soon     Pyuria    Routine UA obtained in ED due to confusion in elderly patient (I.e. no clear urologic symptoms such as burning, urgency, frequency)    UA has 19 WBCs, large leukocyte esterase    UC has 10-50,000 CFU/milliliter mixture of urogenital noam    Discontinue antibiotics since no convincing evidence of infection     Diet: Regular Diet Adult    DVT Prophylaxis: Pneumatic Compression Devices  Del Angel Catheter: Not present  Central Lines: None  Cardiac Monitoring: None  Code Status: Full Code      Disposition Plan   Expected Discharge: 06/06/2022  Anticipated discharge location: long-term care facility (Memory Care)    Delays:     Placement - LTC   pending placement        The patient's " care was discussed with the Bedside Nurse and Patient.    Heidy Mclean MD  Hospitalist Service  [facility]  Securely message with the [employer] Web Console (learn more here)  Text page via [employer] Paging/Directory       Clinically Significant Risk Factors Present on Admission                      ______________________________________________________________________     Interval History   Patient did not wake or speak during my visit, cannot obtain review of systems.  Discussed with RN, patient's sister visited yesterday. Mariposa has been refusing to have her vital signs checked, refusing medications and most cares.    Data reviewed today: I reviewed all medications, new labs and imaging results over the last 24 hours. I personally reviewed no images or EKG's today.    Physical Exam   Vital Signs:       Pulse: 72   Resp: 16 SpO2: 99 % O2 Device: None (Room air)    Weight: 137 lbs 0 oz  Constitutional: Seems awake but keeps eyes closed throughout our visit  Respiratory: Clear to auscultation bilaterally, no crackles or wheezing  Cardiovascular: Regular rate and rhythm  GI: Normal bowel sounds, soft, non-distended, non-tender  Skin/Integumen: No rash on exposed skin (she will not let staff assist her with toileting or examine skin under her undergarment)  Other: Is withdrawn, irritable (batted my hand away when I auscultated her lungs)      Data   Recent Labs   Lab 06/01/22  1655   WBC 7.8   HGB 14.5   MCV 91         POTASSIUM 3.5   CHLORIDE 109   CO2 25   BUN 16   CR 0.76   ANIONGAP 6   JAYE 9.2   GLC 90     No results found for this or any previous visit (from the past 24 hour(s)).  Medications       [Held by provider] buPROPion  150 mg Oral Daily     cyanocobalamin  1,000 mcg Oral Daily     donepezil  5 mg Oral At Bedtime     [Held by provider] FLUoxetine  10 mg Oral Daily     levothyroxine  100 mcg Oral Daily     traZODone  100 mg Oral At Bedtime

## 2022-06-05 NOTE — PROGRESS NOTES
Observation Goals:    -diagnostic tests and consults completed and resulted - not met  -vital signs normal or at patient baseline - unable to assess; pt refusing vital signs  -tolerating oral intake to maintain hydration - met  -safe disposition plan has been identified - not met

## 2022-06-05 NOTE — PLAN OF CARE
Goal Outcome Evaluation:    Orientation/Cognitive: Oriented to self only  Observation Goals (Met/ Not Met): Not met  Mobility Level/Assist Equipment: SBA with walker/gait belt  Fall Risk (Y/N): Yes  Behavior Concerns: Labile; refusing vital signs/assessments/cares during the night; (see MD notify note); very irritable when assessments initiated despite explanations.  Pain Management: No behavioral/non-verbal signs of discomfort.  Tele/VS/O2:  Pt refused vital signs overnight; not on tele  ABNL Lab/BG: None  Diet: Regular; encourage fluids  Bowel/Bladder: Voiding; occasionally incontinent of urine  Skin Concerns: None  Drains/Devices: No PIV  Tests/Procedures for next shift: Psych to reconsult   Anticipated DC date & active delays: To be decided pending placement  Patient Stated Goal for Today: None given    Pt slept all night after refusing any attempt at getting vital signs or getting an assessment done at 2315.  Also refusing vital signs and lab draw at 0600.

## 2022-06-05 NOTE — PLAN OF CARE
Goal Outcome Evaluation:    Orientation/Cognitive: alert to self only   Observation Goals (Met/ Not Met): not met  Mobility Level/Assist Equipment: SBA GB/W  Fall Risk (Y/N): Y  Behavior Concerns: mood labile, uncooperative and irritable  Pain Management: headache managed with PRN tylenol x1  Tele/VS/O2: refused VS except O2 and pulse  ABNL Lab/BG: none today  Diet: Regular  Bowel/Bladder: incontinent urine  Skin Concerns: refused skin assessment  Drains/Devices: no PIV  Tests/Procedures for next shift: psych to reconsult  Anticipated DC date & active delays: TBD pending placement  Patient Stated Goal for Today: headache relief

## 2022-06-05 NOTE — PROGRESS NOTES
Brief Hospitalist Cross cover note:        BP: 132/81 Pulse: 72   Resp: 16 SpO2: 99 % O2 Device: None (Room air)       Paged that pt refused vitals. Awaiting memory care placement.  - no vital signs at night to allow for sleep (pt care order placed)  - vital sign order changed to Q12h        Zeny Roberts MD  1:09 AM

## 2022-06-05 NOTE — CONSULTS
Triage and Transition - Consult and Liaison     Mariposa Mendez  June 5, 2022      Psychiatry consult acknowledged. Reviewed note by Amish Mendoza CNP on 6/2. Reported patient does not have capacity at this time with gradual decline in cognition. He also recommended utilizing next of kin for decision making. Due to this, patient does not require a hold. Should patient's mentation drastically improve, can reconsult to determine capacity again, however, due to gradual decline and concern for dementia, patient likely to not improve. Will consult with provider to review medication for bipolar.    Corinna Mike, [facility]  Triage and Transition - Consult and Liaison   [phone]

## 2022-06-05 NOTE — PROGRESS NOTES
Pt agitated this am. Sleeping mostly.  Refusing vitals checks and meds. Refused lab draw. Per report, pt was not cooperative with cares last night.

## 2022-06-05 NOTE — PROVIDER NOTIFICATION
MD Notification    Notified Person: MD    Notified Person Name:  Kia    Notification Date/Time: 06/05/2022 01:04    Notification Interaction: text paged    Purpose of Notification: Pt refusing vital signs; awaiting memory care placement; restarted on trazadone for sleep; can I get an order to hold vital signs/assessments overnight to help with sleep?     Orders Received: Vital signs changed to every 12 hours, and ok for no vital signs overnight to allow for sleep.    Comments:

## 2022-06-06 NOTE — PROGRESS NOTES
Care Management Follow Up    Length of Stay (days): 0    Expected Discharge Date: 06/06/2022     Concerns to be Addressed:       Patient plan of care discussed at interdisciplinary rounds: Yes    Anticipated Discharge Disposition: Long Term Care     Anticipated Discharge Services: None  Anticipated Discharge DME: None    Patient/family educated on Medicare website which has current facility and service quality ratings: no  Education Provided on the Discharge Plan:    Patient/Family in Agreement with the Plan: yes    Referrals Placed by CM/SW:    Private pay costs discussed: Not applicable    Additional Information:  JAYDA received voicemail from Yesenia [phone] from the [facility].  JAYDA left voicemail for Yesenia that she can come and assess pt at any time and provided pt room number.   JAYDA received voicemail from Yesenia at [facility].  She states that she needs to confirm that pt has been taking psychotropic meds again, as they will require this for admission. They will not to further assessment until we can confirm this.   JAYDA left voicemail for sister Marian, who at this time is the [facility].  Daughter may proceed with guardianship in the future.  JAYDA text paged MD about problem that [facility] has with psychotropic meds.         Alda Fine, [facility]

## 2022-06-06 NOTE — PROGRESS NOTES
"[facility]    Medicine Progress Note - Hospitalist Service        Date of Admission:  6/1/2022  4:15 PM    Assessment & Plan:   Ms. Mariposa Mendez is a 77-year-old female patient with history including dementia, hypothyroidism and depression/anxiety, who presents to [facility] with failure to thrive.  Upon home nurse visit on the day prior to presentation, the patient was noted to be disheveled and her home in disarray with rotted food in the refrigerator and feces \"spread all over.\"  She was arranged to see her primary care provider 06/01/2022 and there she was placed on a hold and sent to [facility] for evaluation and safe disposition.    Failure to thrive due to dementia  * The patient lives at home alone.  She has a home nurse visit a few times a week, set her up by her sister, who is her power of  and lives in [place].  Noted that in 2020 she had a long hospitalization at [facility] for issues including dementia and placement to TCU was planned at that time, but apparently at the last minute, was ultimately decided to take the patient home (the decision was made by her brother, Isaac, who was power of  at that time).   * Initial presentation as above.  Current POA, sister, wanted the patient in a higher level of care.  -Social Work following up.  -family has been working on getting patient into [facility] care, they reviewed. referral and would like to have a face-to-face meeting with the patient today.  -Management of dementia as below.     Dementia with behavioral disturbance (agitation).    Depression/anxiety.  * Patient with known history of dementia.  * She became agitated in the ER once it was told her that she had come to the hospital.  She was given olanzapine with improvement.   -Psychiatry consulted who initially recommended holding fluoxetine and bupropion  -Continue trazodone 100 mg at bedtime  -olanzapine 5 mg every 6 hours p.o. as needed for acute " agitation.  If patient refuses or is unable to take oral medications and is severely agitated, can use olanzapine 5 mg IM.    -Continue Aricept  -Initially placed on 72-hour hold.  Per psychiatry patient does not require hold anymore.  -They also feel that she does not have medical decision-making capacity and recommend utilizing next of kin for decision-making.  -Routine delirium precautions   -re-orient as needed.  -Maintain normal day/night, sleep/wake cycles.     Hypothyroidism   -TSH is elevated (29), free T4 is low, consistent with hypothyroidism  -Home medication list includes levothyroxine at a reasonable dose; question compliance.  Pharmacy notes that she last filled a 90-day supply of Synthroid on 11/5/2021.  -continue Synthroid 100 mcg daily     Pyuria  -Routine UA obtained in ED due to confusion   -UA has 19 WBCs, large leukocyte esterase  -UC has 10-50,000 CFU/milliliter mixture of urogenital noam  -off antibiotics now     Diet: Regular Diet Adult     DVT Prophylaxis: Pneumatic Compression Devices   Del Angel Catheter: Not present  Code Status: Full Code     Disposition Plan    Expected Discharge: 06/06/2022     Anticipated discharge location: long-term care facility (Memory Care)    Delays:     Placement - LTC          Entered: Aleks Dawson MD 06/06/2022, 12:23 PM        Clinically Significant Risk Factors Present on Admission                        The patient's care was discussed with the Bedside Nurse and Patient.    Aleks Dawson MD  Hospitalist Service  [facility]  Text Page 7AM-6PM  Securely message with the [employer] Web Console (learn more here)  Text page via [employer] Paging/Directory    ______________________________________________________________________    Interval History   Patient indifferent to my questions.  Prefers not to be bothered.  Per nursing report no acute nursing concerns    Data reviewed today: I reviewed all medications, new labs and imaging results  "over the last 24 hours. I personally reviewed no images or EKG's today.    Physical Exam   Vital signs:      BP: 122/68 Pulse: 79   Resp: 16 SpO2: 97 % O2 Device: None (Room air)     Weight: 60.8 kg (134 lb 1.6 oz)  Estimated body mass index is 23.02 kg/m  as calculated from the following:    Height as of 5/11/18: 1.626 m (5' 4\").    Weight as of this encounter: 60.8 kg (134 lb 1.6 oz).      Wt Readings from Last 2 Encounters:   06/06/22 60.8 kg (134 lb 1.6 oz)   05/22/18 50.3 kg (110 lb 14.4 oz)       Gen: Awake, difficult to accurately assess orientation as patient does not want to be bothered and is indifferent to my questions  Resp: CTA B/L, normal WOB  CVS: RRR, no murmur  Abd/GI: Soft, non-tender. BS- normoactive.    Neuro- CN- intact.       Data   Recent Labs   Lab 06/01/22  1655   WBC 7.8   HGB 14.5   MCV 91         POTASSIUM 3.5   CHLORIDE 109   CO2 25   BUN 16   CR 0.76   ANIONGAP 6   JAYE 9.2   GLC 90       No results found for this or any previous visit (from the past 24 hour(s)).  Medications       [Held by provider] buPROPion  150 mg Oral Daily     cyanocobalamin  1,000 mcg Oral Daily     donepezil  5 mg Oral At Bedtime     [Held by provider] FLUoxetine  10 mg Oral Daily     levothyroxine  100 mcg Oral Daily     traZODone  100 mg Oral At Bedtime                 "

## 2022-06-06 NOTE — PLAN OF CARE
"Goal Outcome Evaluation:    Patient alert to self only, irritable. Patient appears stable, only breath counts taken as patient refused cares and asked to be \"left alone\" repeatedly when cares were offered. IND in room, SBA in barger. Door alarm & long arm sit. Incontinent but refuses brief check  & change. Reported headache but refused offer of tylenol. Discharge TBD pending placement.    "

## 2022-06-06 NOTE — PROGRESS NOTES
Observation goals  PRIOR TO DISCHARGE        Comments: -diagnostic tests and consults completed and resulted Not Met: psych consult pending  -vital signs normal or at patient baseline Met: pt refusing vitals  -tolerating oral intake to maintain hydration Met  -safe disposition plan has been identified Not Met  Nurse to notify provider when observation goals have been met and patient is ready for discharge.

## 2022-06-06 NOTE — PROGRESS NOTES
Observation goals  PRIOR TO DISCHARGE        Comments: -diagnostic tests and consults completed and resulted - not met  -vital signs normal or at patient baseline - pt refused VS  -tolerating oral intake to maintain hydration - met  -safe disposition plan has been identified - not met  Nurse to notify provider when observation goals have been met and patient is ready for discharge.

## 2022-06-06 NOTE — PLAN OF CARE
"Orientation/Cognitive: alert only to self otherwise disoriented  Observation Goals (Met/ Not Met): Not Met  Mobility Level/Assist Equipment: Independent in room, SBA in the halls  Fall Risk (Y/N): N  Behavior Concerns: pt is very irritable and uncooperative, refused any cares, refused to eat lunch until 1430. Mood tends to wax and wane a lot with pt being pleasant and then a minute later being irritated and angry again.  Pain Management: denies  Tele/VS/O2: JANICE. Pt refusing vitals  ABNL Lab/BG: JANICE, pt refusing  Diet: regular diet, refusing to eat  Bowel/Bladder: up to the bathroom independently, occasionally incontinent but is refusing her brief to be changed  Skin Concerns: JANICE, pt is refusing  Drains/Devices: no PIV  Tests/Procedures for next shift: psych to see regarding meds, SW following for placement  Anticipated DC date & active delays: TBD pending placement  Patient Stated Goal for Today: \"I want you to get out and leave me alone\"    "

## 2022-06-06 NOTE — PLAN OF CARE
"Goal Outcome Evaluation:    Orientation/Cognitive: Oriented to self only  Observation Goals (Met/ Not Met): Not met, placement  Mobility Level/Assist Equipment: ind in room. SBA in barger, refused GB/W  Fall Risk (Y/N): Yes  Behavior Concerns: Labile, uncooperative, irritable. Refused skin assessment, brief check/change.  Pain Management: PAINAD 1.  Tele/VS/O2: No vital signs scheduled this shift. Previous shift VSS on RA  ABNL Lab/BG: None  Diet: Regular; encourage fluids  Bowel/Bladder: Voiding in BR; occasionally incontinent of urine. Able to change brief x1  Skin Concerns: dry, unable to do full assessment dt pt refusal  Drains/Devices: No PIV  Tests/Procedures for next shift: Psych provider to consult about restarting bipolar meds  Anticipated DC date & active delays: TBD pending placement  Patient Stated Goal for Today: to be 'left alone\"    Observation goals  PRIOR TO DISCHARGE       Comments: -diagnostic tests and consults completed and resulted: met   -vital signs normal or at patient baseline: met   -tolerating oral intake to maintain hydration: met   -safe disposition plan has been identified: not met   Nurse to notify provider when observation goals have been met and patient is ready for discharge.      "

## 2022-06-06 NOTE — PROGRESS NOTES
"Observation goals  PRIOR TO DISCHARGE        Comments: -diagnostic tests and consults completed and resulted Not Met: psych consult pending, [facility] to come assess pt  -vital signs normal or at patient baseline Met: pt refusing vitals  -tolerating oral intake to maintain hydration Met  -safe disposition plan has been identified Not Met  Nurse to notify provider when observation goals have been met and patient is ready for discharge.        Pt is irritable, upon staff entering her room she immediately says \"get out\" and doesn't allow any cares to be done.  "

## 2022-06-07 NOTE — PLAN OF CARE
"Sleeping for the majority of the night.  Noted attempt to do assessment, vital signs, and cares with refusal.  \"Leave me alone\".  Continue to monitor.  "

## 2022-06-07 NOTE — PROVIDER NOTIFICATION
Amparo (short stay nurse) called  phone and states, per sister Olga Fonseca at [facility] wants to arrange an interview with pt prior to accepting. Amparo (short stay nurse) did add pt has been calm and cooperative all of today.    Updated  team.     ADDENDUM: 3:59 PM did check with Olga (phone [phone]    fax [phone]) to see if she had heard from  yet, she said no.  I asked if she had the most recent Psych note from today, she said no.  I did share this with her.   team to follow to arrange the interview.      Galina Chou RN, BSN, PHN  [facility]  Inpatient Care Management - FLOAT  Mobile: [phone] 06/07/22 until 4pm

## 2022-06-07 NOTE — PROGRESS NOTES
Observation goals  PRIOR TO DISCHARGE        Comments: -diagnostic tests and consults completed and resulted - not met  -vital signs normal or at patient baseline - met  -tolerating oral intake to maintain hydration - met  -safe disposition plan has been identified - not met  Nurse to notify provider when observation goals have been met and patient is ready for discharge.         Orientation/Cognitive: Alert to self only.   Observation Goals (Met/ Not Met): not met  Mobility Level/Assist Equipment: Ind   Fall Risk (Y/N): No  Behavior Concerns: Calm throughout shift ex mood changes instantly, gets frustrated easily. Up and keeps walking in room/hallway. Refused cares/VS.   Pain Management: Denies  Tele/VS/O2: VSS on RA ex refused temp.  ABNL Lab/BG: TSH 29.12   Diet: Reg  Bowel/Bladder: Incontinent. Refused to be changed.  Skin Concerns: JANICE. Refusing.  Drains/Devices: No PIV access  Tests/Procedures for next shift: Awaiting psych to see pt. Memory care placement pending.  Anticipated DC date & active delays: SW following for placement.   Patient Stated Goal for Today: JANICE

## 2022-06-07 NOTE — CONSULTS
"      Psychiatry Consultation; Follow up              Reason for Consult, requesting source:    Follow up, medication management. Seen by psych consult team on 6/2/22 and 6/5/22  Requesting source: [facility]    Labs and imaging reviewed, discussed with nursing               Interim history:    Mariposa Mendez is a 77 year old female who was admitted to [facility] on 6/1/22 with inability to care for herself in the home environment secondary to history of dementia. It was noted by home health nurse that patient was disheveled, home was in disarray, and there were apparently feces \"spread all over.\" PMH significant for dementia, hypothyroidism, depression, and anxiety. Psychiatry is reconsulted today to follow up on medication management. Previous recommendations were to continue holding Wellbutrin and Prozac as both of these medications can be activating which can lead to agitation.     Today, I met with Mariposa in her room where she is initially seen dancing and singing with staff. She is generally pleasant but her mood is labile, quickly goes from laughing and joking to upset, angry or sad. She is paranoid, states that there are \"two people here who hate me\" and reports that she thinks they are trying to hurt her, she wants to leave the hospital to get away from them. She is noted to be watchful of people walking by her room, paranoid about their intentions of walking by her room. She is easily redirectable when she gets upset. Forgetful in conversation, oriented to self only. She states she slept well last night which is corroborated by nursing. Nursing staff reports that they have not needed to use any PRN medications for several days, reports she can be redirected and calmed without use of PRN medication.        Current Medications:       [Held by provider] buPROPion  150 mg Oral Daily     cyanocobalamin  1,000 mcg Oral Daily     donepezil  5 mg Oral At Bedtime     [Held by provider] " "FLUoxetine  10 mg Oral Daily     levothyroxine  100 mcg Oral Daily     traZODone  100 mg Oral At Bedtime              MSE:   Appearance: awake, alert  Attitude:  cooperative  Eye Contact:  good  Mood:  labile  Affect:  mood congruent and labile  Speech:  pressured speech  Psychomotor Behavior:  no evidence of tardive dyskinesia, dystonia, or tics  Thought Process:  illogical and tangental  Associations:  no loose associations  Thought Content:  no evidence of suicidal ideation or homicidal ideation and paranoid delusions present  Insight:  poor  Judgement:  poor  Oriented to:  self only  Attention Span and Concentration:  poor  Recent and Remote Memory:  poor        Vital signs:      BP: 123/58 Pulse: 92     SpO2: 99 % O2 Device: None (Room air)     Weight: 60.8 kg (134 lb 1.6 oz)  Estimated body mass index is 23.02 kg/m  as calculated from the following:    Height as of 5/11/18: 1.626 m (5' 4\").    Weight as of this encounter: 60.8 kg (134 lb 1.6 oz).              DSM-5 Diagnosis:   Unspecified neurocognitive disorder          Assessment:   Mariposa Mendez is a 77 year old female who was admitted to [facility] on 6/1/22 with inability to care for herself in the home environment secondary to history of dementia. She is confused, forgetful, and paranoid at times but easily redirected and has not required any PRN medications for agitation. At this time, I would recommend continuing to hold fluoxetine and bupropion, as these medications can contribute to agitation through activating effects. As her behavior has been successfully managed without PRN medications for several days, I would not recommend starting a scheduled dosing of olanzapine or quetiapine. At discharge, would recommend continuing PRN dosing of olanzapine ODT for agitation.          Summary of Recommendations:   1. Continue current dosing of donepezil, trazodone.    2. Continue to hold bupropion and fluoxetine     3. At discharge, recommend " "sending with script of olanzapine ODT 5mg twice daily as needed for agitation    4. Please reconsult psychiatry as needed    TRACY García [facility]  Consult/Liaison Psychiatry   [facility]    Contact information available via [facility] Paging/Directory  If I am not available, then [facility] MENA line ([phone]) should know who is covering our consult service.   \"Much or all of the text in this note was generated through the use of Dragon Dictate voice to text software. Errors in spelling or words which appear to be out of contact are unintentional, may be present due having escaped editing\"           "

## 2022-06-07 NOTE — PROGRESS NOTES
"[facility]    Medicine Progress Note - Hospitalist Service        Date of Admission:  6/1/2022  4:15 PM    Assessment & Plan:   Ms. Mariposa Mendez is a 77-year-old female patient with history including dementia, hypothyroidism and depression/anxiety, who presents to [facility] with failure to thrive.  Upon home nurse visit on the day prior to presentation, the patient was noted to be disheveled and her home in disarray with rotted food in the refrigerator and feces \"spread all over.\"  She was arranged to see her primary care provider 06/01/2022 and there she was placed on a hold and sent to [facility] for evaluation and safe disposition.    Failure to thrive due to dementia  * The patient lives at home alone.  She has a home nurse visit a few times a week, set her up by her sister, who is her power of  and lives in [place].  Noted that in 2020 she had a long hospitalization at [facility] for issues including dementia and placement to TCU was planned at that time, but apparently at the last minute, was ultimately decided to take the patient home (the decision was made by her brother, Isaac, who was power of  at that time).   * Initial presentation as above.  Current POA, sister, wanted the patient in a higher level of care.  -Social Work following up.  -family has been working on getting patient into [facility], they reviewed. referral and would like to have a face-to-face meeting with the patient today.  -Management of dementia as below.     Dementia with behavioral disturbance (agitation).    Depression/anxiety.  * Patient with known history of dementia.  * She became agitated in the ER once it was told her that she had come to the hospital.  She was given olanzapine with improvement.   -Psychiatry consulted who initially recommended holding fluoxetine and bupropion.  Discussed with him again today, they recommend discontinuing altogether  -Continue trazodone 100 mg at " bedtime  -olanzapine 5 mg every 6 hours p.o. as needed for acute agitation.  If patient refuses or is unable to take oral medications and is severely agitated, can use olanzapine 5 mg IM.    -Continue Aricept  -Initially placed on 72-hour hold.  Per psychiatry patient does not require hold anymore.  -They also feel that she does not have medical decision-making capacity and recommend utilizing next of kin for decision-making.  -Routine delirium precautions   -re-orient as needed.  -Maintain normal day/night, sleep/wake cycles.     Hypothyroidism   -TSH is elevated (29), free T4 is low, consistent with hypothyroidism  -Home medication list includes levothyroxine at a reasonable dose; question compliance.  Pharmacy notes that she last filled a 90-day supply of Synthroid on 11/5/2021.  -continue Synthroid 100 mcg daily     Pyuria  -Routine UA obtained in ED due to confusion   -UA has 19 WBCs, large leukocyte esterase  -UC has 10-50,000 CFU/milliliter mixture of urogenital noam  -off antibiotics now     Diet: Regular Diet Adult     DVT Prophylaxis: Pneumatic Compression Devices   Del Angel Catheter: Not present  Code Status: Full Code     Disposition Plan    Expected Discharge: Awaiting placement  Anticipated discharge location: long-term care facility ([facility] Care)    Delays:     Placement - LTC  Other (Add Comment)          Entered: Aleks Dawson MD 06/07/2022, 10:02 AM        Clinically Significant Risk Factors Present on Admission                        The patient's care was discussed with the Bedside Nurse and Patient.    Aleks Dawson MD  Hospitalist Service  [facility]  Text Page 7AM-6PM  Securely message with the Vocera Web Console (learn more here)  Text page via [employer] Paging/Directory    ______________________________________________________________________    Interval History   No acute issues overnight.  She has been relatively calm.  Awaiting placement    Data reviewed today:  "I reviewed all medications, new labs and imaging results over the last 24 hours. I personally reviewed no images or EKG's today.    Physical Exam   Vital signs:      BP: 123/58 Pulse: 92     SpO2: 99 % O2 Device: None (Room air)     Weight: 60.8 kg (134 lb 1.6 oz)  Estimated body mass index is 23.02 kg/m  as calculated from the following:    Height as of 5/11/18: 1.626 m (5' 4\").    Weight as of this encounter: 60.8 kg (134 lb 1.6 oz).      Wt Readings from Last 2 Encounters:   06/06/22 60.8 kg (134 lb 1.6 oz)   05/22/18 50.3 kg (110 lb 14.4 oz)       Gen: Awake, confused, difficult to accurately assess orientation.  Resp: CTA B/L, normal WOB  CVS: RRR, no murmur  Abd/GI: Soft, non-tender. BS- normoactive.    Neuro- CN- intact.       Data   Recent Labs   Lab 06/01/22  1655   WBC 7.8   HGB 14.5   MCV 91         POTASSIUM 3.5   CHLORIDE 109   CO2 25   BUN 16   CR 0.76   ANIONGAP 6   JAYE 9.2   GLC 90       No results found for this or any previous visit (from the past 24 hour(s)).  Medications       [Held by provider] buPROPion  150 mg Oral Daily     cyanocobalamin  1,000 mcg Oral Daily     donepezil  5 mg Oral At Bedtime     [Held by provider] FLUoxetine  10 mg Oral Daily     levothyroxine  100 mcg Oral Daily     traZODone  100 mg Oral At Bedtime               "

## 2022-06-08 NOTE — PROGRESS NOTES
Care Management Follow Up    Length of Stay (days): 0    Expected Discharge Date: 06/09/2022     Concerns to be Addressed:       Patient plan of care discussed at interdisciplinary rounds: Yes    Anticipated Discharge Disposition: Long Term Care     Anticipated Discharge Services: None  Anticipated Discharge DME: None    Patient/family educated on Medicare website which has current facility and service quality ratings: no  Education Provided on the Discharge Plan:    Patient/Family in Agreement with the Plan: yes    Referrals Placed by CM/SW:    Private pay costs discussed: Not applicable    Additional Information:    Gilberto spoke with sister, Marina (ph:  [phone]) and she is aware that [facility] has declined.  Marian asked that a referral be sent to  and [facility].  Gilberto sent referral to  and will look into [facility] tomorrow.      Nimo Mcleod, HUGHSW

## 2022-06-08 NOTE — PROGRESS NOTES
Care Management Follow Up    Length of Stay (days): 0    Expected Discharge Date: 06/08/2022     Concerns to be Addressed:       Patient plan of care discussed at interdisciplinary rounds: Yes    Anticipated Discharge Disposition:  Tbd. Poss Long Term Care     Anticipated Discharge Services: None  Anticipated Discharge DME: None    Additional Information:  Received call from Olga @ [facility]. [phone].  After review of Psych consult the patient is declined by this facility r/t they are not a secure [facility] Unit, and they are not a Psych unit- residents would need to be on established, scheduled Psych meds. Patient's behavior is labile  Discussed w/ SW

## 2022-06-08 NOTE — PLAN OF CARE
"Goal Outcome Evaluation:  Orientation/Cognitive: Oriented to self only  Observation Goals (Met/ Not Met): Not met, placement  Mobility Level/Assist Equipment: ind in room. SBA in barger, refused GB/W  Fall Risk (Y/N): Yes  Behavior Concerns: Labile, uncooperative, irritable. Refused skin assessment, brief check/change.  Pain Management: denied pain  Tele/VS/O2: refused evening vitals, pt sleeping overnight  ABNL Lab/BG: None  Diet: Regular; encourage fluids  Bowel/Bladder: Voiding in BR; occasionally incontinent of urine. Refused checking brief  Skin Concerns: dry, unable to do full assessment dt pt refusal  Drains/Devices: No PIV  Tests/Procedures for next shift: psych following   Anticipated DC date & active delays: TBD pending placement  Patient Stated Goal for Today: \"to leave\"    Observation goals  PRIOR TO DISCHARGE       Comments: -diagnostic tests and consults completed and resulted: met   -vital signs normal or at patient baseline: met   -tolerating oral intake to maintain hydration: met   -safe disposition plan has been identified: not met   Nurse to notify provider when observation goals have been met and patient is ready for discharge.    "

## 2022-06-08 NOTE — PROGRESS NOTES
"[facility]    Medicine Progress Note - Hospitalist Service        Date of Admission:  6/1/2022  4:15 PM    Assessment & Plan:   Ms. Mariposa Mendez is a 77-year-old female patient with history including dementia, hypothyroidism and depression/anxiety, who presents to [facility] with failure to thrive.  Upon home nurse visit on the day prior to presentation, the patient was noted to be disheveled and her home in disarray with rotted food in the refrigerator and feces \"spread all over.\"  She was arranged to see her primary care provider 06/01/2022 and there she was placed on a hold and sent to [facility] for evaluation and safe disposition.    Failure to thrive due to dementia  * The patient lives at home alone.  She has a home nurse visit a few times a week, set her up by her sister, who is her power of  and lives in [place].  Noted that in 2020 she had a long hospitalization at [facility] for issues including dementia and placement to TCU was planned at that time, but apparently at the last minute, was ultimately decided to take the patient home (the decision was made by her brother, Isaac, who was power of  at that time).   * Initial presentation as above.  Current POA, sister, wanted the patient in a higher level of care.  -Social Work following up.  -family has been working on getting patient into [facility] care, they reviewed. referral and would like to have a face-to-face meeting with the patient today.  -Management of dementia as below.     Dementia with behavioral disturbance (agitation).    Depression/anxiety.  * Patient with known history of dementia.  * She became agitated in the ER once it was told her that she had come to the hospital.  She was given olanzapine with improvement.   -Continue trazodone 100 mg at bedtime  -Continue Aricept  -Initially placed on 72-hour hold.  Per psychiatry patient does not require hold anymore.  -They also feel that she does not " have medical decision-making capacity and recommend utilizing next of kin for decision-making.  -Psychiatry following intermittently, they recommended holding fluoxetine and bupropion indefinitely.  Please see their follow-up consult note from 6/7.  -Routine delirium precautions   -re-orient as needed.  -Maintain normal day/night, sleep/wake cycles.     Hypothyroidism   -TSH is elevated (29), free T4 is low, consistent with hypothyroidism  -Home medication list includes levothyroxine at a reasonable dose; question compliance.  Pharmacy notes that she last filled a 90-day supply of Synthroid on 11/5/2021.  -continue Synthroid 100 mcg daily     Pyuria  -Routine UA obtained in ED due to confusion   -UA has 19 WBCs, large leukocyte esterase  -UC has 10-50,000 CFU/milliliter mixture of urogenital noam  -off antibiotics now     Diet: Regular Diet Adult     DVT Prophylaxis: Pneumatic Compression Devices   Del Angel Catheter: Not present  Code Status: Full Code     Disposition Plan    Expected Discharge: Awaiting placement  Anticipated discharge location: long-term care facility ([facility])    Delays:     Placement - LTC  Other (Add Comment)          Entered: Aleks Dawson MD 06/08/2022, 9:40 AM        Clinically Significant Risk Factors Present on Admission                        The patient's care was discussed with the Bedside Nurse and Patient.    Aleks Dawson MD  Hospitalist Service  [facility]  Text Page 7AM-6PM  Securely message with the Vocera Web Console (learn more here)  Text page via "[employer] Paging/Directory    ______________________________________________________________________    Interval History   No acute events overnight.  Patient irritable at the time of my visit and does not want to be bothered.    Data reviewed today: I reviewed all medications, new labs and imaging results over the last 24 hours. I personally reviewed no images or EKG's today.    Physical Exam   Vital  "signs:            Resp: 16         Weight: 60.8 kg (134 lb 1.6 oz)  Estimated body mass index is 23.02 kg/m  as calculated from the following:    Height as of 5/11/18: 1.626 m (5' 4\").    Weight as of this encounter: 60.8 kg (134 lb 1.6 oz).      Wt Readings from Last 2 Encounters:   06/06/22 60.8 kg (134 lb 1.6 oz)   05/22/18 50.3 kg (110 lb 14.4 oz)       Gen: Awake, irritable, she does not want to be bothered.  Resp:normal WOB  Neuro- CN- intact.       Data   Recent Labs   Lab 06/01/22  1655   WBC 7.8   HGB 14.5   MCV 91         POTASSIUM 3.5   CHLORIDE 109   CO2 25   BUN 16   CR 0.76   ANIONGAP 6   JAYE 9.2   GLC 90       No results found for this or any previous visit (from the past 24 hour(s)).  Medications       [Held by provider] buPROPion  150 mg Oral Daily     cyanocobalamin  1,000 mcg Oral Daily     donepezil  5 mg Oral At Bedtime     [Held by provider] FLUoxetine  10 mg Oral Daily     levothyroxine  100 mcg Oral Daily     traZODone  100 mg Oral At Bedtime             "

## 2022-06-08 NOTE — PLAN OF CARE
Orientation/Cognitive: Alert to self only.   Observation Goals (Met/ Not Met): partially met discharge pending  Mobility Level/Assist Equipment: Ind   Fall Risk (Y/N): No  Behavior Concerns: Calm throughout shift ex mood changes but redirectable. Up and keeps walking in room/hallway. Refused cares/VS.   Pain Management: Denies  Tele/VS/O2: VSS on RA ex refused temp.  ABNL Lab/BG: TSH 29.12   Diet: Reg  Bowel/Bladder: Incontinent. Changed 2x.  Skin Concerns: JANICE. Refusing.  Drains/Devices: No PIV access  Tests/Procedures for next shift:  psych consult complete- Memory care placement pending.  Anticipated DC date & active delays: SW following for placement.   Patient Stated Goal for Today: JANICE

## 2022-06-08 NOTE — PROGRESS NOTES
Observation goals  PRIOR TO DISCHARGE        -diagnostic tests and consults completed and resulted: met   -vital signs normal or at patient baseline: met   -tolerating oral intake to maintain hydration: met   -safe disposition plan has been identified: not met     Nurse to notify provider when observation goals have been met and patient is ready for discharge.

## 2022-06-08 NOTE — PLAN OF CARE
"Goal Outcome Evaluation:    Plan of Care Reviewed With: patient       Orientation/Cognitive: Alert to self only  Observation Goals (Met/ Not Met): Not met  Mobility Level/Assist Equipment: IND/SBA   Fall Risk (Y/N): No  Behavior Concerns: Calm throughout shift except for frequent mood swings, easy to redirect. Refusing cares and VS.   Pain Management: C/o pain, patient states \"somewhere\" RN asked where and patient said \"I'm not telling you.\" RN offered pain meds, pt declined  Tele/VS/O2: JANICE, pt refusing VS  ABNL Lab/BG: TSH 29.12, T4 0.69  Diet: Regular  Bowel/Bladder: Incontinent  Skin Concerns: JANICE, refusing assessement  Drains/Devices: No PIV   Tests/Procedures for next shift: LTC placement pending, referrals sent  Anticipated DC date & active delays: SW following for placement.   Patient Stated Goal for Today: JANICE  "

## 2022-06-09 NOTE — CONSULTS
"      Psychiatry Consultation; Follow up              Reason for Consult, requesting source:    Follow up, agitation  Requesting source: José Luis Lauren    Labs and imaging reviewed, discussed with nursing               Interim history:    Mariposa Mendez is a 77 year old female who was admitted to [facility] on 6/1/22 with inability to care for herself in the home environment secondary to history of dementia. It was noted by home health nurse that patient was disheveled, home was in disarray, and there were apparently feces \"spread all over.\" PMH significant for dementia, hypothyroidism, depression, and anxiety. Psychiatry is reconsulted today to follow up on medication management. Previous recommendations were to continue holding Wellbutrin and Prozac as both of these medications can be activating which can lead to agitation.     I met with Mariposa in her room today where she is seen laying calmly in bed watching TV. She is irritable in conversation, does not know why she is in the hospital and does not think she has seen the doctor once since she has been here. States she would like to go home. Describes mood as \"up and down\" but unable to elaborate on this, she does deny feeling depressed or sad. Reports she slept poorly last night, states she is in pain but when asked where her pain is located, she looks away and does not respond. She is forgetful in conversation, forgets what we are talking about and forgets my name despite telling her multiple times throughout interview.    Nursing staff reports that her mood is labile, easily shifts from calm to agitated to calm again. Intermittently refuses medication, has missed several doses of medications over the past few days. Refuses care at times as well. She is easily redirectable when she becomes agitated.        Current Medications:       [Held by provider] buPROPion  150 mg Oral Daily     cyanocobalamin  1,000 mcg Oral Daily     donepezil  5 mg " "Oral At Bedtime     [Held by provider] FLUoxetine  10 mg Oral Daily     levothyroxine  100 mcg Oral Daily     traZODone  100 mg Oral At Bedtime              MSE:   Appearance: awake, alert  Attitude:  slightly uncooperative  Eye Contact:  fair  Mood:  irritable  Affect:  appropriate and in normal range and mood congruent  Speech:  clear, coherent  Psychomotor Behavior:  no evidence of tardive dyskinesia, dystonia, or tics  Thought Process:  illogical and tangental  Associations:  no loose associations  Thought Content:  no evidence of suicidal ideation or homicidal ideation and no evidence of psychotic thought  Insight:  poor  Judgement:  poor  Oriented to:  self, place  Attention Span and Concentration:  poor  Recent and Remote Memory:  poor         Vital signs:  Temp: 97.9  F (36.6  C) Temp src: Oral BP: 125/78 Pulse: 84   Resp: 16 SpO2: 98 % O2 Device: None (Room air)     Weight: 60.8 kg (134 lb 1.6 oz)  Estimated body mass index is 23.02 kg/m  as calculated from the following:    Height as of 5/11/18: 1.626 m (5' 4\").    Weight as of this encounter: 60.8 kg (134 lb 1.6 oz).              DSM-5 Diagnosis:   Unspecified neurocognitive disorder          Assessment:   Mariposa Mendez is a 77 year old female who was admitted to [facility] on 6/1/22 with inability to care for herself in the home environment secondary to history of dementia. She is confused, forgetful, and paranoid at times but easily redirected and has not required any PRN medications for agitation. However, nursing staff reports that she has been offered PRN medications but refused, which is not reflected in the MAR. Discussed with nursing to nicolette as \"refused\" to provide documentation that PRN medication was warranted but she would not take it. Encouraged nursing to attempt to use PRN olanzapine ODT for agitation and document response.          Summary of Recommendations:   1. No medication changes at this time, utilize PRN olanzapine " "ODT for agitation. If she refuses, please nicolette this in the MAR as \"refused\" to have documentation that medication was attempted. IM formulation is also available if she is severely agitated and refusing to take PO.    2. Continue to hold bupropion and fluoxetine.    3. Please reconsult psychiatry as needed    TRACY García CNP  Consult/Liaison Psychiatry   [facility]    Contact information available via [facility] Paging/Directory  If I am not available, then [facility] line ([phone]) should know who is covering our consult service.   \"Much or all of the text in this note was generated through the use of Dragon Dictate voice to text software. Errors in spelling or words which appear to be out of contact are unintentional, may be present due having escaped editing\"           "

## 2022-06-09 NOTE — PROGRESS NOTES
Care Management Follow Up    Length of Stay (days): 0    Expected Discharge Date: 06/11/2022     Concerns to be Addressed:       Patient plan of care discussed at interdisciplinary rounds: Yes    Anticipated Discharge Disposition: Long Term Care     Anticipated Discharge Services: None  Anticipated Discharge DME: None    Patient/family educated on Medicare website which has current facility and service quality ratings: no  Education Provided on the Discharge Plan:    Patient/Family in Agreement with the Plan: yes    Referrals Placed by CM/SW:    Private pay costs discussed: Not applicable    Additional Information:  SW spoke with psych NP today to discuss pt discharge needs.  NP states that pt likely needs a locked [facility]-retirement.  NP states that pt labile mood is likely her baseline.    SW left voicemail for sister Marian.    SW should speak with sister to discuss locked [facility] options for pt.  SW is unclear on whether family is considering additional locked [facility]-retirement options or is waiting for SW to research options.  SW may need to explain that a locked [facility] is needed for pt safety.   SW on 6/10 should attempt to reach sister Marian again to discuss geographic preferences.  If family is needing SW assistance to identify options, SW should consult the [facility] GuideBook which JAYDA has left on the desk in SW office.  Pt will not be able to discharge home due to conditions of her home at admission.        HUGH BorjaSW

## 2022-06-09 NOTE — PROGRESS NOTES
"[facility]    Medicine Progress Note - Hospitalist Service        Date of Admission:  6/1/2022  4:15 PM    Assessment & Plan:   Ms. Mariposa Mendez is a 77-year-old female patient with history including dementia, hypothyroidism and depression/anxiety, who presents to [facility] with failure to thrive.  Upon home nurse visit on the day prior to presentation, the patient was noted to be disheveled and her home in disarray with rotted food in the refrigerator and feces \"spread all over.\"  She was arranged to see her primary care provider 06/01/2022 and there she was placed on a hold and sent to [facility] for evaluation and safe disposition.    Failure to thrive due to dementia  * The patient lives at home alone.  She has a home nurse visit a few times a week, set her up by her sister, who is her power of  and lives in [place].  Noted that in 2020 she had a long hospitalization at [facility] for issues including dementia and placement to TCU was planned at that time, but apparently at the last minute, was ultimately decided to take the patient home (the decision was made by her brother, Isaac, who was power of  at that time).   * Initial presentation as above.  Current POA, sister, wanted the patient in a higher level of care.  -Social Work following up.  -family has been working on getting patient into [facility] care, they reviewed. referral and would like to have a face-to-face meeting with the patient today.  -Management of dementia as below.     Dementia with behavioral disturbance (agitation).    Depression/anxiety.  * Patient with known history of dementia.  * She became agitated in the ER once it was told her that she had come to the hospital.  She was given olanzapine with improvement.   -Continue trazodone 100 mg at bedtime  -Continue Aricept  -Initially placed on 72-hour hold.  Per psychiatry patient does not require hold anymore.  -They also feel that she does not " have medical decision-making capacity and recommend utilizing next of kin for decision-making.  -Psychiatry following intermittently, they recommended holding fluoxetine and bupropion indefinitely.  Please see their follow-up consult note from 6/7.  -Routine delirium precautions   -re-orient as needed.  -Maintain normal day/night, sleep/wake cycles.     Hypothyroidism   TSH is elevated (29), free T4 is low, consistent with hypothyroidism  -Home medication list includes levothyroxine at a reasonable dose; question compliance.  Pharmacy notes that she last filled a 90-day supply of Synthroid on 11/5/2021.   -continue Synthroid 100 mcg daily     Pyuria  -Routine UA obtained in ED due to confusion   -UA has 19 WBCs, large leukocyte esterase  -UC has 10-50,000 CFU/milliliter mixture of urogenital noam  -off antibiotics now     Diet: Regular Diet Adult     DVT Prophylaxis: Pneumatic Compression Devices   Del Angel Catheter: Not present  Code Status: Full Code     Disposition Plan    Expected Discharge: Awaiting placement  Anticipated discharge location: long-term care facility ([facility])    Delays:     Placement - LTC          Entered: José Luis Lauren DO 06/09/2022, 3:28 PM        Clinically Significant Risk Factors Present on Admission                        The patient's care was discussed with the Bedside Nurse and Patient.      José Luis Lauren DO  Hospitalist [facility]  Pager: [phone]      ______________________________________________________________________    Interval History   No acute events overnight.  Patient irritable and does not want to be examined    Data reviewed today: I reviewed all medications, new labs and imaging results over the last 24 hours. I personally reviewed no images or EKG's today.    Physical Exam   Vital signs:  Temp: 97.9  F (36.6  C) Temp src: Oral BP: 129/73 Pulse: 85   Resp: 18 SpO2: 97 % O2 Device: None (Room air)     Weight: 60.8 kg (134 lb 1.6 oz)  Estimated body mass index is  "23.02 kg/m  as calculated from the following:    Height as of 5/11/18: 1.626 m (5' 4\").    Weight as of this encounter: 60.8 kg (134 lb 1.6 oz).      Wt Readings from Last 2 Encounters:   06/06/22 60.8 kg (134 lb 1.6 oz)   05/22/18 50.3 kg (110 lb 14.4 oz)       Gen: Awake, irritable, she does not want to be bothered.  Resp:normal WOB  Neuro- CN- intact.       Data   No lab results found in last 7 days.    No results found for this or any previous visit (from the past 24 hour(s)).  Medications       [Held by provider] buPROPion  150 mg Oral Daily     cyanocobalamin  1,000 mcg Oral Daily     donepezil  5 mg Oral At Bedtime     [Held by provider] FLUoxetine  10 mg Oral Daily     [START ON 6/10/2022] levothyroxine  112 mcg Oral Daily     traZODone  100 mg Oral At Bedtime             "

## 2022-06-09 NOTE — PLAN OF CARE
Goal Outcome Evaluation:    Plan of Care Reviewed With: patient     Orientation/Cognitive: Alert to self only  Observation Goals (Met/ Not Met): Not met, needs placement  Mobility Level/Assist Equipment: IND/SBA   Fall Risk (Y/N): No  Behavior Concerns: Calm throughout shift except for frequent mood swings, easy to redirect. Refusing cares and VS.  Pain Management: Denies pain  Tele/VS/O2: JANICE, pt refusing VS  ABNL Lab/BG: TSH 29.12, T4 0.69  Diet: Regular  Bowel/Bladder: Intermittent incontinence  Skin Concerns: scattered bruising and scabs  Drains/Devices: No PIV   Tests/Procedures for next shift: LTC placement pending, referrals sent  Anticipated DC date & active delays: SW following for placement.   Patient Stated Goal for Today: JANICE

## 2022-06-09 NOTE — PROGRESS NOTES
A&Oxself. Up ad silver in room, door alarm in place. Long arm sit this shift. Pt refused to take medications, they are in bed or room somewhere pt would not let RN to look for meds. Will not allow RN to assess pt, or check brief for incontinence. Discharge pending LTC placement.

## 2022-06-09 NOTE — UTILIZATION REVIEW
Admission Status; Secondary Review Determination    Under the authority of the Utilization Management Committee, the utilization review process indicated a secondary review on the above patient. The review outcome is based on review of the medical records, discussions with staff, and applying clinical experience noted on the date of the review.    (x) Inpatient Status Appropriate - This patient's medical care is consistent with medical management for inpatient care and reasonable inpatient medical practice.    RATIONALE FOR DETERMINATION: 77-year-old female with history of progressive dementia, hypothyroidism, depression, anxiety who was found at home noncompliant with medications with disheveled home, rotten food and refrigerator and feces spread all over.  Patient unsafe at home and therefore brought into the hospital and admitted.  Patient's depression/anxiety medications held due to concern of increasing agitation.  Patient has very labile behavior with frequent episodes of agitation as well as episodes of refusal of medications and difficulty prescribing as needed medications as needed.  Patient did have pyuria and was treated with a course of antibiotics.  Due to patient's unpredictable refusal to take medications, irritability with agitation, patient not safe for discharge and is requiring careful monitoring, evaluating for medication needs as well as as needed medications as needed with the caveat that patient will always take them.  As patient is requiring multiple nights in the hospital to optimize patient's dementia with agitation/delirium tendencies inpatient care appropriate.    At the time of admission with the information available to the attending physician more than 2 nights Hospital complex care was anticipated, based on patient risk of adverse outcome if treated as outpatient and complex care required. Inpatient admission is appropriate based on the Medicare guidelines.    This document was  produced using voice recognition software    The information on this document is developed by the utilization review team in order for the business office to ensure compliance. This only denotes the appropriateness of proper admission status and does not reflect the quality of care rendered.    The definitions of Inpatient Status and Observation Status used in making the determination above are those provided in the CMS Coverage Manual, Chapter 1 and Chapter 6, section 70.4.    Sincerely,    Damion Henry MD  Utilization Review  Physician Advisor  [facility].

## 2022-06-10 NOTE — PROGRESS NOTES
"[facility]    Medicine Progress Note - Hospitalist Service        Date of Admission:  6/1/2022  4:15 PM    Assessment & Plan:   Ms. Mariposa Mendez is a 77-year-old female patient with history including dementia, hypothyroidism and depression/anxiety, who presents to [facility] with failure to thrive.  Upon home nurse visit on the day prior to presentation, the patient was noted to be disheveled and her home in disarray with rotted food in the refrigerator and feces \"spread all over.\"  She was arranged to see her primary care provider 06/01/2022 and there she was placed on a hold and sent to [facility] for evaluation and safe disposition.    Failure to thrive due to dementia  * The patient lives at home alone.  She has a home nurse visit a few times a week, set her up by her sister, who is her power of  and lives in [place].  Noted that in 2020 she had a long hospitalization at [facility] for issues including dementia and placement to TCU was planned at that time, but apparently at the last minute, was ultimately decided to take the patient home (the decision was made by her brother, Isaac, who was power of  at that time).   * Initial presentation as above.  Current POA, sister, wanted the patient in a higher level of care.  -Social Work following up.  -family has been working on getting patient into [facility] care, they reviewed. referral and would like to have a face-to-face meeting with the patient today.  -Management of dementia as below.     Dementia with behavioral disturbance (agitation).    Depression/anxiety.  * Patient with known history of dementia.  * She became agitated in the ER once it was told her that she had come to the hospital.  She was given olanzapine with improvement.   -Continue trazodone 100 mg at bedtime  -Continue Aricept  -Initially placed on 72-hour hold.  Per psychiatry patient does not require hold anymore.  -They also feel that she does not " have medical decision-making capacity and recommend utilizing next of kin for decision-making.  -Psychiatry following intermittently, they recommended holding fluoxetine and bupropion indefinitely.  Please see their follow-up consult note from 6/7.  -Routine delirium precautions   -re-orient as needed.  -Maintain normal day/night, sleep/wake cycles.     Hypothyroidism   TSH is elevated (29), free T4 is low, consistent with hypothyroidism  -Home medication list includes levothyroxine at a reasonable dose; question compliance.  Pharmacy notes that she last filled a 90-day supply of Synthroid on 11/5/2021.   -continue Synthroid 100 mcg daily     Pyuria  -Routine UA obtained in ED due to confusion   -UA has 19 WBCs, large leukocyte esterase  -UC has 10-50,000 CFU/milliliter mixture of urogenital noam  -off antibiotics now     Diet: Regular Diet Adult     DVT Prophylaxis: Pneumatic Compression Devices   Del Angel Catheter: Not present  Code Status: Full Code     Disposition Plan    Expected Discharge: Awaiting placement  Anticipated discharge location: long-term care facility ([facility])    Delays:     Placement - LTC          Entered: José Luis Lauren DO 06/10/2022, 3:08 PM        Clinically Significant Risk Factors Present on Admission                        The patient's care was discussed with the Bedside Nurse and Patient.      José Luis Lauren DO  Hospitalist [facility]  Pager: [phone]      ______________________________________________________________________    Interval History   No acute events overnight.  Patient more cheerful today but somewhat flirtatious.     Data reviewed today: I reviewed all medications, new labs and imaging results over the last 24 hours. I personally reviewed no images or EKG's today.    Physical Exam   Vital signs:      BP: 110/70 Pulse: 74   Resp: 18 SpO2: 96 % O2 Device: None (Room air)     Weight: 60.8 kg (134 lb 1.6 oz)  Estimated body mass index is 23.02 kg/m  as calculated from  "the following:    Height as of 5/11/18: 1.626 m (5' 4\").    Weight as of this encounter: 60.8 kg (134 lb 1.6 oz).      Wt Readings from Last 2 Encounters:   06/06/22 60.8 kg (134 lb 1.6 oz)   05/22/18 50.3 kg (110 lb 14.4 oz)       Gen: Awake, irritable, she does not want to be bothered.  Resp:normal WOB  Neuro- CN- intact.       Data   No lab results found in last 7 days.    No results found for this or any previous visit (from the past 24 hour(s)).  Medications       [Held by provider] buPROPion  150 mg Oral Daily     cyanocobalamin  1,000 mcg Oral Daily     donepezil  5 mg Oral At Bedtime     [Held by provider] FLUoxetine  10 mg Oral Daily     levothyroxine  100 mcg Oral Daily     traZODone  100 mg Oral At Bedtime             "

## 2022-06-10 NOTE — PROGRESS NOTES
"Care Management Follow Up    Length of Stay (days): 0    Expected Discharge Date: 06/11/2022     Concerns to be Addressed:       Patient plan of care discussed at interdisciplinary rounds: Yes    Anticipated Discharge Disposition: Long Term Care     Anticipated Discharge Services: None  Anticipated Discharge DME: None    Patient/family educated on Medicare website which has current facility and service quality ratings: no  Education Provided on the Discharge Plan:    Patient/Family in Agreement with the Plan: yes    Referrals Placed by CM/SW:    Private pay costs discussed:     Additional Information:  Patient's sister, who is patient's alternate health care agent has contacted [facility] and determined the facility has a memory care LTC vacancy.  She requested a referral be faxed to [facility].  Writer has spoken with Katia in admissions and she confirms the facility has a vacancy.  A referral was sent to Katia thru [facility]. She will ask the nurse manager for the memory care unit to assess.  If [facility] offers patient admission, the earliest they can admit is on Monday.   Writer attempted to contact to update her however was unable to get thru. The system kept saying \"try your call later\".  Plan: Expect to hear back from [facility] on MOnday.    Late afternoon, received another message from patient's sister Marian.  She reports she has found a facility for patient and made a deposit.. The facility is [address] [place] [address] and she reports the nurse from [address] [place] [address] can come out tomorrow to assess patient.  Writer called sister back to follow up with this plan however had to leave a message.  Plan:  Saturday  will try to reach sister or [address] [place] [address] to arrange the assessment.     Nimo Page, HUGHSW      "

## 2022-06-10 NOTE — PROGRESS NOTES
Observation goals  PRIOR TO DISCHARGE        Comments:      -Diagnostic tests and consults completed and resulted -Met.     -Vital signs normal or at patient baseline -Unknown.  Has been refusing vital signs.     -Tolerating oral intake to maintain hydration -Met at this time.     -Safe disposition plan has been identified -Not met.

## 2022-06-10 NOTE — PLAN OF CARE
"Goal Outcome Evaluation       Orientation/Cognitive: Oriented to self only.    Observation Goals (Met/ Not Met): Not met.  Changed to inpatient status today.   Mobility Level/Assist Equipment: IND in room. SBA in barger.   Fall Risk (Y/N): Yes.    Behavior Concerns:Uncooperative, irritable at times but easily re-directable. Refused skin assessment.    Pain Management: Complaints of back pain.  Offered Tylenol x1 and a warm pack with relief.    Tele/VS/O2:   Was able to get HR and B/P reading; refused other vitals.  ABNL Lab/BG: None.  Diet: Regular; encourage fluids.  Bowel/Bladder: Voiding in BR; occasionally incontinent of urine.   Drains/Devices: No PIV  Tests/Procedures for next shift: N/A.   Patient Stated Goal for Today: \"to leave\"  Discharge pending placement.  SW following.  Updating family daily.            "

## 2022-06-10 NOTE — PLAN OF CARE
"Goal Outcome Evaluation:         Observation goals  PRIOR TO DISCHARGE        Comments:     -Diagnostic tests and consults completed and resulted -Metr    -Vital signs normal or at patient baseline -Unknown, refused vitals    -Tolerating oral intake to maintain hydration -Met    -Safe disposition plan has been identified -Not met    Nurse to notify provider when observation goals have been met and patient is ready for discharge.          Orientation/Cognitive: Oriented to self only  Observation Goals (Met/ Not Met): Not met, placement pending  Mobility Level/Assist Equipment: ind in room. SBA in barger  Fall Risk (Y/N): Yes  Behavior Concerns:Uncooperative, irritable at times but easily re-directable. Refused skin assessment, refused medications  Pain Management: denied pain  Tele/VS/O2: refused vitals  ABNL Lab/BG: None  Diet: Regular; encourage fluids  Bowel/Bladder: Voiding in BR; occasionally incontinent of urine.   Drains/Devices: No PIV  Tests/Procedures for next shift:   Patient Stated Goal for Today: \"to leave\"  Other info=Pt slept through the night.             "

## 2022-06-10 NOTE — PLAN OF CARE
Goal Outcome Evaluation:         Observation goals  PRIOR TO DISCHARGE        Comments:     -Diagnostic tests and consults completed and resulted -Metr    -Vital signs normal or at patient baseline -Unknown, refused vitals    -Tolerating oral intake to maintain hydration -Met    -Safe disposition plan has been identified -Not met    Nurse to notify provider when observation goals have been met and patient is ready for discharge.

## 2022-06-11 NOTE — PLAN OF CARE
Goal Outcome Evaluation:      Orientation/Cognitive: Alert to self, disoriented x3, refusing cares, meds and assesments  Observation Goals (Met/ Not Met): N/A  Mobility Level/Assist Equipment: SBA/ind  Fall Risk (Y/N):Y  Behavior Concerns: Sometimes verbally aggressive and agitated  Pain Management: Denies this shift  Tele/VS/O2: Refuses assessment  ABNL Lab/BG: none this shift  Diet: Reg  Bowel/Bladder: Incontinent, refuses luis fernando care  Skin Concerns: JANICE, refuses assesment  Drains/Devices:None  Tests/Procedures for next shift:none  Anticipated DC date & active delays: Awaiting [facility] placement in a locked unit  Patient Stated Goal for Today:Find placement

## 2022-06-11 NOTE — PROGRESS NOTES
"[facility]    Medicine Progress Note - Hospitalist Service        Date of Admission:  6/1/2022  4:15 PM    Assessment & Plan:   Ms. Mariposa Mendez is a 77-year-old female patient with history including dementia, hypothyroidism and depression/anxiety, who presents to [facility] with failure to thrive.  Upon home nurse visit on the day prior to presentation, the patient was noted to be disheveled and her home in disarray with rotted food in the refrigerator and feces \"spread all over.\"  She was arranged to see her primary care provider 06/01/2022 and there she was placed on a hold and sent to [facility] for evaluation and safe disposition.    Failure to thrive due to dementia  * The patient lives at home alone.  She has a home nurse visit a few times a week, set her up by her sister, who is her power of  and lives in [place].  Noted that in 2020 she had a long hospitalization at [facility] for issues including dementia and placement to TCU was planned at that time, but apparently at the last minute, was ultimately decided to take the patient home (the decision was made by her brother, Isaac, who was power of  at that time).   * Initial presentation as above.  Current POA, sister, wanted the patient in a higher level of care.  -Social Work following up.  -family has been working on getting patient into [facility] care, they reviewed. referral and would like to have a face-to-face meeting with the patient today.  -Management of dementia as below.     Dementia with behavioral disturbance (agitation).    Depression/anxiety.  * Patient with known history of dementia.  * She became agitated in the ER once it was told her that she had come to the hospital.  She was given olanzapine with improvement.   -Continue trazodone 100 mg at bedtime  -Continue Aricept  -Initially placed on 72-hour hold.  Per psychiatry patient does not require hold anymore.  -They also feel that she does not " have medical decision-making capacity and recommend utilizing next of kin for decision-making.  -Psychiatry following intermittently, they recommended holding fluoxetine and bupropion indefinitely.  Please see their follow-up consult note from 6/7.  -Routine delirium precautions   -re-orient as needed.  -Maintain normal day/night, sleep/wake cycles.     Hypothyroidism   TSH is elevated (29), free T4 is low, consistent with hypothyroidism  -Home medication list includes levothyroxine at a reasonable dose; question compliance.  Pharmacy notes that she last filled a 90-day supply of Synthroid on 11/5/2021.   -continue Synthroid 100 mcg daily     Pyuria  -Routine UA obtained in ED due to confusion   -UA has 19 WBCs, large leukocyte esterase  -UC has 10-50,000 CFU/milliliter mixture of urogenital noam  -off antibiotics now     Diet: Regular Diet Adult     DVT Prophylaxis: Pneumatic Compression Devices   Del Angel Catheter: Not present  Code Status: Full Code     Disposition Plan    Expected Discharge: Awaiting placement  Anticipated discharge location: long-term care facility ([facility])    Delays:     Placement - LTC          Entered: José Luis Lauren DO 06/11/2022, 3:40 PM        Clinically Significant Risk Factors Present on Admission                    The patient's care was discussed with the Bedside Nurse and Patient.      José Luis Lauren DO  Hospitalist [facility]  Pager: [phone]      ______________________________________________________________________    Interval History   No acute events overnight.  Patient more cheerful today      Data reviewed today: I reviewed all medications, new labs and imaging results over the last 24 hours. I personally reviewed no images or EKG's today.    Physical Exam   Vital signs:        Pulse: 103     SpO2: 98 % O2 Device: None (Room air)     Weight: 60.8 kg (134 lb 1.6 oz)  Estimated body mass index is 23.02 kg/m  as calculated from the following:    Height as of 5/11/18:  "1.626 m (5' 4\").    Weight as of this encounter: 60.8 kg (134 lb 1.6 oz).      Wt Readings from Last 2 Encounters:   06/06/22 60.8 kg (134 lb 1.6 oz)   05/22/18 50.3 kg (110 lb 14.4 oz)       Gen: Awake, irritable, she does not want to be bothered.  Resp:normal WOB  Neuro- CN- intact.       Data   No lab results found in last 7 days.    No results found for this or any previous visit (from the past 24 hour(s)).  Medications       [Held by provider] buPROPion  150 mg Oral Daily     cyanocobalamin  1,000 mcg Oral Daily     donepezil  5 mg Oral At Bedtime     [Held by provider] FLUoxetine  10 mg Oral Daily     levothyroxine  100 mcg Oral Daily     traZODone  100 mg Oral At Bedtime             "

## 2022-06-11 NOTE — PROGRESS NOTES
Orientation/Cognitive: Alert to self   Observation Goals (Met/ Not Met): Inpatient   Mobility Level/Assist Equipment: indep  Fall Risk (Y/N):Y  Behavior Concerns: Sometimes verbally aggressive and agitated, refusing cares, meds and assesments  Pain Management: Tylenol  Tele/VS/O2: Refuses assessment  ABNL Lab/BG: none this shift  Diet: Reg  Bowel/Bladder: Incontinent, refuses luis fernando care  Skin Concerns: JANICE, refuses assesment  Drains/Devices:None  Tests/Procedures for next shift:none  Anticipated DC date & active delays: Awaiting [facility] placement in a locked unit possibly Monday 6/13  Patient Stated Goal for Today:no goal stated

## 2022-06-12 NOTE — PLAN OF CARE
Goal Outcome Evaluation:                   Observation goals  PRIOR TO DISCHARGE        Comments: -diagnostic tests and consults completed and resulted -met  -vital signs normal or at patient baseline -met  -tolerating oral intake to maintain hydration -met  -adequate pain control on oral analgesics -met  -dyspnea improved and O2 sats greater than 88% on room air or prior home oxygen levels -met  -returns to baseline functional status -not met  -safe disposition plan has been identified -not met  Nurse to notify provider when observation goals have been met and patient is ready for discharge

## 2022-06-12 NOTE — PROGRESS NOTES
[id number]. Alert and oriented to self. Pt is forgetful and confused due to dementia. Can be aggressive and agitated at times. Regular diet. Pt refused assessments. Incontinent of both bowel and bladder. SBA; pt has a sit.

## 2022-06-12 NOTE — PROGRESS NOTES
Care Management Follow Up    Length of Stay (days): 2    Expected Discharge Date: 06/13/2022     Concerns to be Addressed:       Patient plan of care discussed at interdisciplinary rounds: Yes    Anticipated Discharge Disposition: Long Term Care     Anticipated Discharge Services: None  Anticipated Discharge DME: None    Patient/family educated on Medicare website which has current facility and service quality ratings: no  Education Provided on the Discharge Plan:    Patient/Family in Agreement with the Plan: yes    Referrals Placed by CM/SW:    Private pay costs discussed: Not applicable    Additional Information:  JAYDA called sister Marian to follow up on [address] [place] [address] as SW was under the impression they were going to complete an assessment over the weekend. SW received message indicating SW is not able to leave a message at this time. SW called [address] [place] [address] and left a voicemail with the admissions department requesting a call back. JAYDA will continue to follow.      GAYLA Castano    [facility]

## 2022-06-12 NOTE — PLAN OF CARE
Goal Outcome Evaluation:                Neuro- Alert to self  Most Recent Vitals-    refused               Tele/Cardiac- NA  Resp- RA  Activity- independent  Pain- had tylenol  Zyprexa  Drips- NA  Drains/Tubes- NA  Skin- WNL  GI/- incontinent at times   Aggression Color- yellow  COVID status- negative  Plan- TBD  Misc- Patient severely confused denies pain. Refuse complete assessment with VSS. Clients independent in the room plan to fp home pendent     Paty Dee RN

## 2022-06-12 NOTE — PROGRESS NOTES
"[facility]    Medicine Progress Note - Hospitalist Service        Date of Admission:  6/1/2022  4:15 PM    Assessment & Plan:   Ms. Mariposa Mendez is a 77-year-old female patient with history including dementia, hypothyroidism and depression/anxiety, who presents to [facility] with failure to thrive.  Upon home nurse visit on the day prior to presentation, the patient was noted to be disheveled and her home in disarray with rotted food in the refrigerator and feces \"spread all over.\"  She was arranged to see her primary care provider 06/01/2022 and there she was placed on a hold and sent to [facility] for evaluation and safe disposition.    Failure to thrive due to dementia  * The patient lives at home alone.  She has a home nurse visit a few times a week, set her up by her sister, who is her power of  and lives in [place].  Noted that in 2020 she had a long hospitalization at [facility] for issues including dementia and placement to TCU was planned at that time, but apparently at the last minute, was ultimately decided to take the patient home (the decision was made by her brother, Isaac, who was power of  at that time).   * Initial presentation as above.  Current POA, sister, wanted the patient in a higher level of care.  -Social Work following up.  -family has been working on getting patient into [facility] care, they reviewed. referral and would like to have a face-to-face meeting with the patient today.  -Management of dementia as below.     Dementia with behavioral disturbance (agitation).    Depression/anxiety.  * Patient with known history of dementia.  * She became agitated in the ER once it was told her that she had come to the hospital.  She was given olanzapine with improvement.   -Continue trazodone 100 mg at bedtime  -Continue Aricept  -Initially placed on 72-hour hold.  Per psychiatry patient does not require hold anymore.  -They also feel that she does not " have medical decision-making capacity and recommend utilizing next of kin for decision-making.  -Psychiatry following intermittently, they recommended holding fluoxetine and bupropion indefinitely.  Please see their follow-up consult note from 6/7.  -Routine delirium precautions   -re-orient as needed.  -Maintain normal day/night, sleep/wake cycles.     Hypothyroidism   TSH is elevated (29), free T4 is low, consistent with hypothyroidism  -Home medication list includes levothyroxine at a reasonable dose; question compliance.  Pharmacy notes that she last filled a 90-day supply of Synthroid on 11/5/2021.   -continue Synthroid 100 mcg daily     Pyuria  -Routine UA obtained in ED due to confusion   -UA has 19 WBCs, large leukocyte esterase  -UC has 10-50,000 CFU/milliliter mixture of urogenital noam  -off antibiotics now     Diet: Regular Diet Adult     DVT Prophylaxis: Pneumatic Compression Devices   Del Angel Catheter: Not present  Code Status: Full Code     Disposition Plan    Expected Discharge: Awaiting placement  Anticipated discharge location: long-term care facility ([facility])    Delays:     Placement - LTC          Entered: José Luis Lauren DO 06/12/2022, 2:12 PM        Clinically Significant Risk Factors Present on Admission                    The patient's care was discussed with the Bedside Nurse and Patient.      José Luis Lauren DO  Hospitalist [facility]  Pager: [phone]      ______________________________________________________________________    Interval History   No acute events overnight. More agitated today and wanted to be left alone. Later on was sleeping so I did not awake    Data reviewed today: I reviewed all medications, new labs and imaging results over the last 24 hours. I personally reviewed no images or EKG's today.    Physical Exam   Vital signs:                      Weight: 60.8 kg (134 lb 1.6 oz)  Estimated body mass index is 23.02 kg/m  as calculated from the following:    Height as  "of 5/11/18: 1.626 m (5' 4\").    Weight as of this encounter: 60.8 kg (134 lb 1.6 oz).      Wt Readings from Last 2 Encounters:   06/06/22 60.8 kg (134 lb 1.6 oz)   05/22/18 50.3 kg (110 lb 14.4 oz)       Gen: Awake, irritable, she does not want to be bothered.  Resp:normal WOB  Neuro- CN- intact.       Data   No lab results found in last 7 days.    No results found for this or any previous visit (from the past 24 hour(s)).  Medications       [Held by provider] buPROPion  150 mg Oral Daily     cyanocobalamin  1,000 mcg Oral Daily     donepezil  5 mg Oral At Bedtime     [Held by provider] FLUoxetine  10 mg Oral Daily     levothyroxine  100 mcg Oral Daily     traZODone  100 mg Oral At Bedtime             "

## 2022-06-13 NOTE — PLAN OF CARE
Goal Outcome Evaluation:        Observation goals  PRIOR TO DISCHARGE        Comments: -diagnostic tests and consults completed and resulted -met  -vital signs normal or at patient baseline -met  -safe disposition plan has been identified -met  Nurse to notify provider when observation goals have been met and patient is ready for discharge.

## 2022-06-13 NOTE — PLAN OF CARE
Goal Outcome Evaluation:    Plan of Care Reviewed With: patient, caregiver     Overall Patient Progress: improving      Orientation/Cognitive: Alert to self only  Observation Goals (Met/ Not Met): Inpatient   Mobility Level/Assist Equipment: IND  Fall Risk (Y/N):Y  Behavior Concerns: Sometimes verbally aggressive and agitated, refusing cares, meds and assesments -2000 meds not taken, oncoming nurse to attempt.   Pain Management: Tylenol for bilateral knee pain  Tele/VS/O2: vitally stable on RA  ABNL Lab/BG: Na  Diet: Reg  Bowel/Bladder: Incontinent, refuses luis fernando care  Skin Concerns: JANICE, refused assessment, approached X2   Drains/Devices:None  Tests/Procedures for next shift:none  Anticipated DC date & active delays: Awaiting [facility] placement in a locked unit possibly Monday 6/13 at [facility]

## 2022-06-13 NOTE — PROGRESS NOTES
Orientation/Cognitive: Alert to self only  Observation Goals (Met/ Not Met): Inpatient   Mobility Level/Assist Equipment: Indep  Fall Risk (Y/N):Y  Behavior Concerns: Sometimes verbally aggressive and agitated, refusing cares, meds and assesments-morning meds given late   Pain Management: Tylenol for knee pain   Tele/VS/O2: Refuses assessment  ABNL Lab/BG: none this shift  Diet: Reg  Bowel/Bladder: Incontinent, refuses luis fernando care  Skin Concerns: JANICE, refuses assesment  Drains/Devices:None  Tests/Procedures for next shift: Assessment done by Eloisa from [address] [place] [address] DC date & active delays: 6/14  Patient Stated Goal for Today:no goal stated

## 2022-06-13 NOTE — PROGRESS NOTES
Care Management Follow Up    Length of Stay (days): 3    Expected Discharge Date: 06/13/2022     Concerns to be Addressed:       Patient plan of care discussed at interdisciplinary rounds: Yes    Anticipated Discharge Disposition: Long Term Care     Anticipated Discharge Services: None  Anticipated Discharge DME: None    Patient/family educated on Medicare website which has current facility and service quality ratings: no  Education Provided on the Discharge Plan:    Patient/Family in Agreement with the Plan: yes    Referrals Placed by CM/SW:    Private pay costs discussed: Not applicable    Additional Information:  Marian (sister) called stating that [address] [place] [address] needs clinicals faxed to them at [phone].    Writer faxed clinicals to [address] [place] [address].    Addendum: Hilda (daughter) called asking why it's been taking a long time to get patient to [address] [place] [address]. Told her that writer just faxed over the clinicals and were waiting to hear back.    Addendum: Received a call from Marian (sister) stating that she doesn't want writer to contact Hilda as she should only be contacted to Marian. Writer said she would put a note in chart about this. She asked for clinical information for patient. Writer printed and she said she would come to the hospital to pick this up.     GAYLA Diamond

## 2022-06-13 NOTE — PROGRESS NOTES
"[facility]    Medicine Progress Note - Hospitalist Service        Date of Admission:  6/1/2022  4:15 PM    Assessment & Plan:   Ms. Mariposa Mendez is a 77-year-old female patient with history including dementia, hypothyroidism and depression/anxiety, who presents to [facility] with failure to thrive.  Upon home nurse visit on the day prior to presentation, the patient was noted to be disheveled and her home in disarray with rotted food in the refrigerator and feces \"spread all over.\"  She was arranged to see her primary care provider 06/01/2022 and there she was placed on a hold and sent to [facility] for evaluation and safe disposition.    Failure to thrive due to dementia  * The patient lives at home alone.  She has a home nurse visit a few times a week, set her up by her sister, who is her power of  and lives in [place].  Noted that in 2020 she had a long hospitalization at [facility] for issues including dementia and placement to TCU was planned at that time, but apparently at the last minute, was ultimately decided to take the patient home (the decision was made by her brother, Isaac, who was power of  at that time).   * Initial presentation as above.  Current POA, sister, wanted the patient in a higher level of care.  -Social Work following up.  -family has been working on getting patient into [facility] care, they reviewed. referral and would like to have a face-to-face meeting with the patient today.  -Management of dementia as below.     Dementia with behavioral disturbance (agitation).    Depression/anxiety.  * Patient with known history of dementia.  * She became agitated in the ER once it was told her that she had come to the hospital.  She was given olanzapine with improvement.   -Continue trazodone 100 mg at bedtime  -Continue Aricept  -Initially placed on 72-hour hold.  Per psychiatry patient does not require hold anymore.  -They also feel that she does not " have medical decision-making capacity and recommend utilizing next of kin for decision-making.  -Psychiatry following intermittently, they recommended holding fluoxetine and bupropion indefinitely.  Please see their follow-up consult note from 6/7.  -Routine delirium precautions   -re-orient as needed.  -Maintain normal day/night, sleep/wake cycles.     Hypothyroidism   TSH is elevated (29), free T4 is low, consistent with hypothyroidism  -Home medication list includes levothyroxine at a reasonable dose; question compliance.  Pharmacy notes that she last filled a 90-day supply of Synthroid on 11/5/2021.   -continue Synthroid 100 mcg daily     Pyuria  -Routine UA obtained in ED due to confusion   -UA has 19 WBCs, large leukocyte esterase  -UC has 10-50,000 CFU/milliliter mixture of urogenital noam  -off antibiotics now     Diet: Regular Diet Adult     DVT Prophylaxis: Pneumatic Compression Devices   Del Angel Catheter: Not present  Code Status: Full Code     Disposition Plan    Expected Discharge: Awaiting placement  Anticipated discharge location: long-term care facility ([facility])    Delays:     Placement - LTC          Entered: José Luis Lauren DO 06/13/2022, 3:50 PM        Clinically Significant Risk Factors Present on Admission                    The patient's care was discussed with the Bedside Nurse and Patient.      José Luis Lauren DO  Hospitalist [facility]  Pager: [phone]      ______________________________________________________________________    Interval History   No acute events overnight. comfortabe today    Data reviewed today: I reviewed all medications, new labs and imaging results over the last 24 hours. I personally reviewed no images or EKG's today.    Physical Exam   Vital signs:  Temp: 97.8  F (36.6  C) Temp src: Oral BP: 112/71 Pulse: 80   Resp: 18 SpO2: 99 % O2 Device: None (Room air)     Weight: 60.8 kg (134 lb 1.6 oz)  Estimated body mass index is 23.02 kg/m  as calculated from the  "following:    Height as of 5/11/18: 1.626 m (5' 4\").    Weight as of this encounter: 60.8 kg (134 lb 1.6 oz).      Wt Readings from Last 2 Encounters:   06/06/22 60.8 kg (134 lb 1.6 oz)   05/22/18 50.3 kg (110 lb 14.4 oz)       Gen: Awake, irritable, she does not want to be bothered.  Resp:normal WOB  Neuro- CN- intact.       Data   No lab results found in last 7 days.    No results found for this or any previous visit (from the past 24 hour(s)).  Medications       [Held by provider] buPROPion  150 mg Oral Daily     cyanocobalamin  1,000 mcg Oral Daily     donepezil  5 mg Oral At Bedtime     [Held by provider] FLUoxetine  10 mg Oral Daily     levothyroxine  100 mcg Oral Daily     traZODone  100 mg Oral At Bedtime             "

## 2022-06-13 NOTE — PLAN OF CARE
Goal Outcome Evaluation:                Neuro- alert to self only  Most Recent Vitals- Temp: 97.8  F (36.6  C) Temp src: Oral BP: 112/71 Pulse: 80   Resp: 18 SpO2: 99 % O2 Device: None (Room air)   Tele/Cardiac- NA  Resp- RA  Activity- independent  Pain- denies  Drips- NA  Drains/Tubes-NA  Skin-intact  GI/- occasionally incontinent  Aggression Color- Yellow  COVID status- negative  Plan- TBD  Misc- Patient Alert to self only confused up and walking in and out of her room, confused occasionally unable to redirected. Tolerated regular diet voiding, refused for vitals to be taken. plan waiting for appropriate placement.    Paty Dee RN

## 2022-06-14 NOTE — PROGRESS NOTES
"[facility]    Medicine Progress Note - Hospitalist Service    Date of Admission:  6/1/2022    Assessment & Plan        Mariposa Mendez is a 77-year-old female patient with history including dementia, hypothyroidism and depression/anxiety, who presents to [facility] with failure to thrive.  Upon home nurse visit on the day prior to presentation, the patient was noted to be disheveled and her home in disarray with rotted food in the refrigerator and feces \"spread all over.\"  She was arranged to see her primary care provider 06/01/2022 and there she was placed on a hold and sent to [facility] for evaluation and safe disposition.     Failure to thrive due to dementia  * The patient lives at home alone.  She has a home nurse visit a few times a week, set her up by her sister, who is her power of  and lives in [place].  Noted that in 2020 she had a long hospitalization at [facility] for issues including dementia and placement to TCU was planned at that time, but apparently at the last minute, was ultimately decided to take the patient home (the decision was made by her brother, Isaac, who was power of  at that time).   * Initial presentation as above.  Current POA, sister, wanted the patient in a higher level of care.  - Family has been working on getting patient into [place] memory care, social work involved and assisting with placement.  - Management of dementia as below.     Dementia with behavioral disturbance (agitation)  Depression/anxiety  * Patient with known history of dementia.  * She became agitated in the ER once it was told her that she had come to the hospital.  She was given olanzapine with improvement.   - Continue trazodone 100 mg at bedtime.  - Continue Aricept.  - Initially placed on 72-hour hold.  Per psychiatry patient does not require hold anymore. They also feel that she does not have medical decision-making capacity and recommend utilizing next of kin " for decision-making.  - Psychiatry following intermittently, they recommended holding fluoxetine and bupropion indefinitely.  Please see their follow-up consult note from 6/7.  - Routine delirium precautions.   - Re-orient as needed.  - Maintain normal day/night, sleep/wake cycles.  - Family is involved in discharge planning.     Hypothyroidism   TSH is elevated (29), free T4 is low, consistent with hypothyroidism.  - Home medication list includes levothyroxine at a reasonable dose; question compliance.  Pharmacy notes that she last filled a 90-day supply of Synthroid on 11/5/2021.   - Continue PTA Synthroid 100 mcg daily.  - Recheck labs in 4-6 weeks.     Pyuria  - Routine UA obtained in ED due to confusion, suggestive of UTI.  - Urine culture grew 10-50,000 CFU/milliliter mixture of urogenital noam.  - Initially started on empiric antibiotics which were then discontinued when culture results were available.        Diet: Regular Diet Adult    DVT Prophylaxis: Pneumatic Compression Devices  Del Angel Catheter: Not present  Central Lines: None  Cardiac Monitoring: None  Code Status: Full Code      Disposition Plan   Expected Discharge: 06/16/2022     Anticipated discharge location: long-term care facility (Memory Care)    Delays:     Placement - LTC            The patient's care was discussed with the Bedside Nurse and Patient.    Ruddy Barnett MD  Hospitalist Service  [facility]  Securely message with the Vocera Web Console (learn more here)  Text page via [email or web address] Paging/Directory         Clinically Significant Risk Factors Present on Admission                    ______________________________________________________________________    Interval History   Mariposa Mendez was seen this morning. History limited due to dementia. She feels OK today. Wants to call her brother Karthik so he can take her home to see her sick mother. No other complaints/concerns. Discussed with nursing.    Data reviewed  today: I reviewed all medications, new labs and imaging results over the last 24 hours. I personally reviewed no images or EKG's today.    Physical Exam   Vital Signs:           Resp: 16        Weight: 134 lbs 1.6 oz  Constitutional: awake, alert, no apparent distress, walking around in her room.  Patient declined any further exam.    Data   Medications       [Held by provider] buPROPion  150 mg Oral Daily     cyanocobalamin  1,000 mcg Oral Daily     donepezil  5 mg Oral At Bedtime     [Held by provider] FLUoxetine  10 mg Oral Daily     levothyroxine  100 mcg Oral Daily     traZODone  100 mg Oral At Bedtime

## 2022-06-14 NOTE — PLAN OF CARE
Goal Outcome Evaluation:     Orientation/Cognitive: Alert to self only.    Observation Goals (Met/ Not Met): N/A; Inpatient  Mobility Level/Assist Equipment: SBA/IND in the room.  Fall Risk (Y/N): Yes, however, refusing gait belt; assistance.  Behavior Concerns: Refusing cares; can become agitated at times; does well with minimal direction/distraction.  Pain Management:Tylenol provided for complaints of generalized pain.   Tele/VS/O2: Pt refusing vital signs/assessments; no tele; RA.  ABNL Lab/BG: None.  Diet: Regular.  Bowel/Bladder: Incontinent at times.   Skin Concerns: None   Drains/Devices: None  Tests/Procedures for next shift: None  Anticipated DC date & active delays: 1-2 days.   Patient Stated Goal for Today: None given.    Facility assessment completed yesterday.  JAYDA waiting on call back from [address] [place] [address].

## 2022-06-14 NOTE — PLAN OF CARE
Goal Outcome Evaluation:    Orientation/Cognitive: Alert to self only  Observation Goals (Met/ Not Met): N/A; Inpatient  Mobility Level/Assist Equipment: SBA  Fall Risk (Y/N): Yes, however, refusing gait belt; assistance  Behavior Concerns: Refusing cares; can become agitated at times; does well with minimal direction/distraction  Pain Management: Headache relieved with warm pack  Tele/VS/O2: Pt refusing vital signs/assessments; no tele; RA  ABNL Lab/BG: None  Diet: Regular  Bowel/Bladder: Incontinent at times; none noted overnight  Skin Concerns: None   Drains/Devices: None  Tests/Procedures for next shift: None  Anticipated DC date & active delays: 1-2 days  Patient Stated Goal for Today: None given    Pt appeared to sleep well overnight.

## 2022-06-14 NOTE — PLAN OF CARE
Goal Outcome Evaluation:    Plan of Care Reviewed With: patient     Overall Patient Progress: improving    Orientation/Cognitive: Alert to self only  Observation Goals (Met/ Not Met): Inpatient   Mobility Level/Assist Equipment: Ind/SB d/t concern for wondering  Fall Risk (Y/N):Y  Behavior Concerns: refusing cares, wondering through nurses station and into rooms and can be difficult to redirect   Pain Management: Ice pack and tylenol for Left knee pain  Tele/VS/O2: Refuses assessment  ABNL Lab/BG: none this shift  Diet: Reg, good appetite   Bowel/Bladder: Incontinent, refuses luis fernando care  Skin Concerns: JANICE, refuses full assessment. Scattered bruising to visible skin  Drains/Devices:None  Tests/Procedures for next shift: Assessment done by [address] admitting RN  Anticipated DC date & active delays: 6/14 or 6/15  Patient Stated Goal for Today:no goal stated

## 2022-06-14 NOTE — PROGRESS NOTES
Care Management Follow Up    Length of Stay (days): 4    Expected Discharge Date: 06/15/2022     Concerns to be Addressed:       Patient plan of care discussed at interdisciplinary rounds: Yes    Anticipated Discharge Disposition: Long Term Care     Anticipated Discharge Services: None  Anticipated Discharge DME: None    Patient/family educated on Medicare website which has current facility and service quality ratings: no  Education Provided on the Discharge Plan: Yes    Patient/Family in Agreement with the Plan: yes    Referrals Placed by CM/SW:    Private pay costs discussed: Not applicable    Additional Information:  SW placed call to [facility]. SW spoke to Tatyana in admissions, who stated she received the patient's paperwork today and will be able to complete an assessment with the patient on 6/15 late afternoon. Tatyana will update the SW with the decision. SW called the patient's sister Marian. Marian discussed her concerns regarding the patient's chart notes, the SW recommended Marian to call the floor nurse for clarification. Marian stated understanding. SW will call Marian once a determination has been made regarding placement.     SW will continue to follow.     Lei Camargo, W      Lei Camargo

## 2022-06-15 NOTE — PLAN OF CARE
Goal Outcome Evaluation:     Orientation/Cognitive: Alert to self only.    Observation Goals (Met/ Not Met): N/A; Inpatient  Mobility Level/Assist Equipment: SBA/IND in the room.  Fall Risk (Y/N): Yes, however, refusing gait belt; assistance; door alarm  Behavior Concerns: Refusing cares; can become agitated at times; does well with minimal direction/distraction.  Pain Management:Denies pain today   Tele/VS/O2: Pt refusing vital signs/assessments; no tele; RA.  ABNL Lab/BG: None.  Diet: Regular.  Bowel/Bladder: Incontinent at times.   Skin Concerns: None   Drains/Devices: None, no IV access  Tests/Procedures for next shift: None  Anticipated DC date & active delays: 1-2 days; Assessment with [address] [place] [address] this afternoon.  Patient Stated Goal for Today: None given.

## 2022-06-15 NOTE — CONSULTS
CLINICAL NUTRITION SERVICES  -  ASSESSMENT NOTE      Recommendations Ordered by Registered Dietitian (RD):   Trial Ensure twice daily on breakfast and dinner trays    Future/Additional Recommendations:   Encourage meal intake TID   Continue Regular Diet   Consider need for MVI+M for micronutrient needs    Malnutrition:   % Weight Loss:  None noted this admission, unable to assess beyond this due to limited weight hx on file   % Intake:  <75% for > 7 days (moderate malnutrition)  Subcutaneous Fat Loss:  Deferred   Muscle Loss:  Deferred   Fluid Retention:  None noted    Malnutrition Diagnosis: Unable to determine due to lack of weight hx on file and unable to perform NFPE at this time due to patient sleeping        REASON FOR ASSESSMENT  Mariposa Mendez is a 77 year old female seen by Registered Dietitian for Admission Nutrition Risk Screen for positive  Malnutrition Score: 2 (06/13/22 9681)  Have you recently lost weight without trying? Unsure  Have you been eating poorly because of a decreased appetite? No    Per chart review, PMH significant for dementia, hypothyroidism, depression and anxiety.  Admitted 2/2 failure to thrive due to dementia.     Initially admitted to observation status on 6/1. Flipped to inpatient status on 6/10.       NUTRITION HISTORY  - Information obtained from chart review. Attempted to visit patient at bedside, no family present. Patient was sleeping this morning during visit and I was asked not to disturb patient.   - Noted patient is  and was living alone PTA. Resided at an independent living facility - RN staff there noted patient was very disheveled at home with rotting food in the fridge and feces spread all over.   - No known food allergies noted.       CURRENT NUTRITION ORDERS  Diet Order:     Regular     Current Intake/Tolerance:  Patient has been in hospital for 14 days with fluctuating intakes (25-50-75% per flowsheets). Mood and mentation waxes & wanes which is likely  "contributing to fluctuating PO intakes. Most days is receiving 3 good meals daily. Patient has not had anything to eat thus far today, offered to send something but nursing staff declined saying they don't plan to wake patient up to eat anytime soon.       NUTRITION FOCUSED PHYSICAL ASSESSMENT FOR DIAGNOSING MALNUTRITION)  No:  Patient sleeping during attempted visit - did not disturb           Obtained from Chart/Interdisciplinary Team:  Last BM was documented on 6/4.   SW following for placement  Plan for LTC facility assessment this afternoon     ANTHROPOMETRICS  Height: 5' 4\"  Weight: 134 lbs 1.6 oz  Body mass index is 23.02 kg/m .  Weight Status:  Normal BMI  Weight History: Limited weight hx on file to assess weight trends. Admit weight on 6/1 was 58.5 kg - overall up this admit though bed scale being used for weights which is likely causing some fluctuation.   Wt Readings from Last 10 Encounters:   06/06/22 60.8 kg (134 lb 1.6 oz)   05/22/18 50.3 kg (110 lb 14.4 oz)   05/14/18 51.3 kg (113 lb 1.6 oz)   05/11/18 49.9 kg (110 lb)   10/08/08 67.1 kg (148 lb)   10/16/07 64 kg (141 lb)   04/19/07 72.1 kg (159 lb)   04/03/07 71.2 kg (157 lb)   02/13/07 70.3 kg (155 lb)   10/24/06 70.8 kg (156 lb)     Per Care Everywhere --   52.7 kg (116 lb 3.2 oz) 12/01/2020     LABS  Labs reviewed    MEDICATIONS  Medications reviewed  - vitamin B12 1000 mcg/day       ASSESSED NUTRITION NEEDS PER APPROVED PRACTICE GUIDELINES:    Dosing Weight 58.5 kg (admit weight on 6/1)  Estimated Energy Needs: 7090-4887 kcals (25-30 Kcal/Kg)  Justification: maintenance  Estimated Protein Needs: 60-70 grams protein (1-1.2 g pro/Kg)  Justification: preservation of lean body mass  Estimated Fluid Needs: 3271-7596  mL (30-35 mL/kg)  Justification: maintenance or per provider pending fluid status       NUTRITION DIAGNOSIS:  Inadequate oral intake related to fluctuating mentation as evidenced by variable PO intake of 25-50-75% of meals documented " over 14 day admission       NUTRITION INTERVENTIONS  Recommendations / Nutrition Prescription  Trial Ensure twice daily on breakfast and dinner trays   Encourage meal intake TID   Continue Regular Diet   Consider need for MVI+M for micronutrient needs       Implementation  Nutrition education: Not appropriate at this time due to patient condition  Medical Food Supplement - as above       Nutrition Goals  Patient will consistently consume >/= 50% meals + supplements BID at minimum       MONITORING AND EVALUATION:  Progress towards goals will be monitored and evaluated per protocol and Practice Guidelines      Raquel Hall RD, LD  Short Stay RD Pager: [phone]

## 2022-06-15 NOTE — PROGRESS NOTES
"[facility]    Medicine Progress Note - Hospitalist Service    Date of Admission:  6/1/2022    Assessment & Plan        Mariposa Mendez is a 77-year-old female patient with history including dementia, hypothyroidism and depression/anxiety, who presents to [facility] with failure to thrive.  Upon home nurse visit on the day prior to presentation, the patient was noted to be disheveled and her home in disarray with rotted food in the refrigerator and feces \"spread all over.\"  She was arranged to see her primary care provider 06/01/2022 and there she was placed on a hold and sent to [facility] for evaluation and safe disposition.     Failure to thrive due to dementia  * The patient lives at home alone.  She has a home nurse visit a few times a week, set her up by her sister, who is her power of  and lives in [place].  Noted that in 2020 she had a long hospitalization at [facility] for issues including dementia and placement to TCU was planned at that time, but apparently at the last minute, was ultimately decided to take the patient home (the decision was made by her brother, Isaac, who was power of  at that time).   * Initial presentation as above.  Current POA, sister, wanted the patient in a higher level of care.  - Family has been working on getting patient into [place] memory care, social work involved and assisting with placement.  - Management of dementia as below.     Dementia with behavioral disturbance (agitation)  Depression/anxiety  * Patient with known history of dementia.  * She became agitated in the ER once it was told her that she had come to the hospital.  She was given olanzapine with improvement.   - Continue trazodone 100 mg at bedtime.  - Continue Aricept.  - Initially placed on 72-hour hold.  Per psychiatry patient does not require hold anymore. They also feel that she does not have medical decision-making capacity and recommend utilizing next of kin " for decision-making.  - Psychiatry following intermittently, they recommended holding fluoxetine and bupropion indefinitely.  Please see their follow-up consult note from 6/7.  - Routine delirium precautions.   - Re-orient as needed.  - Maintain normal day/night, sleep/wake cycles.  - Family is involved in discharge planning.     Hypothyroidism   TSH is elevated (29), free T4 is low, consistent with hypothyroidism.  - Home medication list includes levothyroxine at a reasonable dose; question compliance.  Pharmacy notes that she last filled a 90-day supply of Synthroid on 11/5/2021.   - Continue PTA Synthroid 100 mcg daily.  - Recheck labs in 4-6 weeks.     Pyuria  - Routine UA obtained in ED due to confusion, suggestive of UTI.  - Urine culture grew 10-50,000 CFU/milliliter mixture of urogenital noam.  - Initially started on empiric antibiotics which were then discontinued when culture results were available.        Diet: Regular Diet Adult  Snacks/Supplements Adult: Ensure Enlive; With Meals    DVT Prophylaxis: Pneumatic Compression Devices  Del Angel Catheter: Not present  Central Lines: None  Cardiac Monitoring: None  Code Status: Full Code      Disposition Plan   Expected Discharge: 06/16/2022     Anticipated discharge location: long-term care facility (Memory Care)    Delays:     Placement - LTC            The patient's care was discussed with the Bedside Nurse and Patient.    Ruddy Barnett MD  Hospitalist Service  [facility]  Securely message with the Vocera Web Console (learn more here)  Text page via [employer] Paging/Directory         Clinically Significant Risk Factors Present on Admission                    ______________________________________________________________________    Interval History   Mariposa Mendez was seen this morning. She was sleeping and did not want to be disrupted.    Data reviewed today: I reviewed all medications, new labs and imaging results over the last 24  hours. I personally reviewed no images or EKG's today.    Physical Exam   Vital Signs:                    Weight: 134 lbs 1.6 oz  Constitutional: laying in the hospital bed, told me she was tired and wanted to go back to sleep, declined any further exam    Data   Medications       [Held by provider] buPROPion  150 mg Oral Daily     cyanocobalamin  1,000 mcg Oral Daily     donepezil  5 mg Oral At Bedtime     [Held by provider] FLUoxetine  10 mg Oral Daily     levothyroxine  100 mcg Oral Daily     traZODone  100 mg Oral At Bedtime

## 2022-06-15 NOTE — PLAN OF CARE
Pt is AOx1, only to self, restless and impulsive at times, refuses cares and VS at times, sometimes redirectable, mentation waxes and wanes, swallow ok, was compliant with meds for shift. Pending TCU placement.

## 2022-06-15 NOTE — PLAN OF CARE
Goal Outcome Evaluation:  [id number]    Full assessment and vital signs deferred due to provider orders and patient's refusal. Patient is up Ind in the room-door alarm present to prevent wandering. Alert to self only. Incontinent at times. Pending TCU placement. Facility assessment to be done today.

## 2022-06-16 NOTE — PROGRESS NOTES
"[facility]    Medicine Progress Note - Hospitalist Service    Date of Admission:  6/1/2022    Assessment & Plan        Mariposa Mendez is a 77-year-old female patient with history including dementia, hypothyroidism and depression/anxiety, who presents to [facility] with failure to thrive.  Upon home nurse visit on the day prior to presentation, the patient was noted to be disheveled and her home in disarray with rotted food in the refrigerator and feces \"spread all over.\"  She was arranged to see her primary care provider 06/01/2022 and there she was placed on a hold and sent to [facility] for evaluation and safe disposition.     Failure to thrive due to dementia  * The patient lives at home alone.  She has a home nurse visit a few times a week, set her up by her sister, who is her power of  and lives in [place].  Noted that in 2020 she had a long hospitalization at [facility] for issues including dementia and placement to TCU was planned at that time, but apparently at the last minute, was ultimately decided to take the patient home (the decision was made by her brother, Isaac, who was power of  at that time).   * Initial presentation as above.  Current POA, sister, wanted the patient in a higher level of care.  - Family has been working on getting patient into [place] memory care, social work involved and assisting with placement.  - Management of dementia as below.     Dementia with behavioral disturbance (agitation)  Depression/anxiety  * Patient with known history of dementia.  * She became agitated in the ER once it was told her that she had come to the hospital.  She was given olanzapine with improvement.   - Continue trazodone 100 mg at bedtime.  - Continue Aricept.  - Initially placed on 72-hour hold.  Per psychiatry patient does not require hold anymore. They also feel that she does not have medical decision-making capacity and recommend utilizing next of kin " for decision-making.  - Psychiatry following intermittently, they recommended holding fluoxetine and bupropion indefinitely.  Please see their follow-up consult note from 6/7.  - Routine delirium precautions.   - Re-orient as needed.  - Maintain normal day/night, sleep/wake cycles.  - Family is involved in discharge planning.     Hypothyroidism   TSH is elevated (29), free T4 is low, consistent with hypothyroidism.  - Home medication list includes levothyroxine at a reasonable dose; question compliance.  Pharmacy notes that she last filled a 90-day supply of Synthroid on 11/5/2021.   - Continue PTA Synthroid 100 mcg daily.  - Recheck labs in early July 2022.     Pyuria  - Routine UA obtained in ED due to confusion, suggestive of UTI.  - Urine culture grew 10-50,000 CFU/milliliter mixture of urogenital noam.  - Initially started on empiric antibiotics which were then discontinued when culture results were available.        Diet: Regular Diet Adult  Snacks/Supplements Adult: Ensure Enlive; With Meals    DVT Prophylaxis: Pneumatic Compression Devices  Del Angel Catheter: Not present  Central Lines: None  Cardiac Monitoring: None  Code Status: Full Code      Disposition Plan   Expected Discharge: 06/16/2022     Anticipated discharge location: long-term care facility (Memory Care)    Delays:     Placement - LTC            The patient's care was discussed with the Bedside Nurse and Patient.    Ruddy Barnett MD  Hospitalist Service  [facility]  Securely message with the Vocera Web Console (learn more here)  Text page via [employer] Paging/Directory         Clinically Significant Risk Factors Present on Admission                    ______________________________________________________________________    Interval History   Mariposa Mendez was seen this morning. History limited by dementia. Denies fevers, chest pain, shortness of breath, nausea, abdominal pain.    Data reviewed today: I reviewed all  medications, new labs and imaging results over the last 24 hours. I personally reviewed no images or EKG's today.    Physical Exam   Vital Signs: Temp: 97.5  F (36.4  C) Temp src: Oral       Resp: 18 SpO2: 97 % O2 Device: None (Room air)    Weight: 134 lbs 1.6 oz  Constitutional: awake, alert, cooperative, no apparent distress, sitting up in a chair  Respiratory: clear to auscultation bilaterally, no crackles or wheezing  Cardiovascular: regular rate and rhythm, normal S1 and S2, no murmur noted  GI: normal bowel sounds, soft, non-distended, non-tender  Skin: warm, dry  Musculoskeletal: no lower extremity pitting edema present  Neurologic: awake, alert, not oriented, moves all extremities    Data   Medications       [Held by provider] buPROPion  150 mg Oral Daily     cyanocobalamin  1,000 mcg Oral Daily     donepezil  5 mg Oral At Bedtime     [Held by provider] FLUoxetine  10 mg Oral Daily     levothyroxine  100 mcg Oral Daily     traZODone  100 mg Oral At Bedtime

## 2022-06-16 NOTE — PLAN OF CARE
[id number]:  Orientation/Cognitive: Alert to self only.    Observation Goals (Met/ Not Met): N/A; Inpatient  Mobility Level/Assist Equipment: SBA/IND in the room.  Fall Risk (Y/N): Yes, however, refusing gait belt; assistance; door alarm  Behavior Concerns: calm and cooperative this shift, re-directable. Pain Management: Denies; no concerning non-verbal s/s  Tele/VS/O2: VSS on RA.  ABNL Lab/BG: None.  Diet: Regular. Good appetite.  Bowel/Bladder: Incontinent at times.   Skin Concerns: None   Drains/Devices: None, no IV access  Tests/Procedures for next shift: None  Anticipated DC date & active delays: 1-2 days; Assessment with Maykel Landin was completed this evening.    Pt enjoyed talking w/ her friend who came to visit this evening.

## 2022-06-16 NOTE — PROGRESS NOTES
A&Oxself. Independent in room. Intermittent agitation w cares, refusal of cares. Door alarm in place, no long arm sitter overnight. Incontinent at times, did not allow RN to check brief overnight. Discharge pending memory care placement.

## 2022-06-16 NOTE — PROGRESS NOTES
Care Management Follow Up    Length of Stay (days): 6    Expected Discharge Date: 06/16/2022     Concerns to be Addressed:       Patient plan of care discussed at interdisciplinary rounds: Yes    Anticipated Discharge Disposition: Long Term Care     Anticipated Discharge Services: None  Anticipated Discharge DME: None    Patient/family educated on Medicare website which has current facility and service quality ratings: no  Education Provided on the Discharge Plan:    Patient/Family in Agreement with the Plan: yes    Referrals Placed by CM/SW:    Private pay costs discussed: Not applicable    Additional Information:  Writer received a call from [facility] ([phone]) stating that patient is moving there tomorrow. She said that she's fine with patient coming at anytime that works best for [facility]. Told her writer would reach out to [facility] to coordinate a time. She asked for discharge orders to be faxed to [phone]. She asked for a week of meds to be filled and brought to the assisted living.     Writer talked to [facility] and she can pick patient up tomorrow to go to [facility] at 1100. She will pick patient up at door 6. Writer called [facility] and let her know that patient will be coming at 1100 tomorrow. Writer let hospitalist know of time for tomorrow and that [facility] needs a week of meds to be filled and brought to [facility].    GAYLA Diamond

## 2022-06-17 NOTE — PROGRESS NOTES
Care Management Discharge Note    Discharge Date: 06/17/2022       Discharge Disposition: Long Term Care    Discharge Services: None    Discharge DME: None    Discharge Transportation: agency    Private pay costs discussed: Not applicable    PAS Confirmation Code:  n/a  Patient/family educated on Medicare website which has current facility and service quality ratings: no    Education Provided on the Discharge Plan:  yes  Persons Notified of Discharge Plans: patient  Patient/Family in Agreement with the Plan: yes    Handoff Referral Completed: Yes    Additional Information:  Discharge orders received. Bed available at [facility]. Marian is picking patient up at hospital (Door 6) at 1100. Faxed discharge orders to [facility].     GAYLA Diamond

## 2022-06-17 NOTE — PLAN OF CARE
[id number]:  Orientation/Cognitive: Alert to self only.    Observation Goals (Met/ Not Met): N/A; Inpatient  Mobility Level/Assist Equipment: SBA/IND in the room.  Fall Risk (Y/N): Yes, however, refusing gait belt; assistance; door alarm in place  Behavior Concerns: calm and cooperative this shift, re-directable. Pain Management: Denies; no concerning non-verbal s/s  Tele/VS/O2: VSS on RA.  ABNL Lab/BG: None.  Diet: Regular. Good appetite.  Bowel/Bladder: Incontinent at times.   Skin Concerns: None   Drains/Devices: None, no IV access  Tests/Procedures for next shift: None  Anticipated DC date & active delays: Plan to discharge to [facility] tomorrow 6/17/22 at 1100. Pt's sister to transport. Please see SW note.     Pt enjoyed talking w/ her friend and sister who came to visit this evening.

## 2022-06-17 NOTE — DISCHARGE SUMMARY
"[facility]  Hospitalist Discharge Summary      Date of Admission:  6/1/2022  Date of Discharge:  6/17/2022  Discharging Provider: Ruddy Barnett MD  Discharge Service: Hospitalist Service    Discharge Diagnoses   Failure to thrive due to dementia  Dementia with behavioral disturbance (agitation)  Depression/anxiety  Hypothyroidism   Pyuria    Follow-ups Needed After Discharge   Follow-up Appointments     Follow-up and recommended labs and tests       Follow up with primary care provider, Patria Shea, within 7 days for   hospital follow- up.  The following labs/tests are recommended: recheck   thyroid function tests in early July.             Discharge Disposition   Discharged to assisted living  Condition at discharge: Stable    Hospital Course   Mariposa Mendez is a 77-year-old female patient with history including dementia, hypothyroidism and depression/anxiety, who presents to [facility] with failure to thrive.  Upon home nurse visit on the day prior to presentation, the patient was noted to be disheveled and her home in disarray with rotted food in the refrigerator and feces \"spread all over.\"  She was arranged to see her primary care provider 06/01/2022 and there she was placed on a hold and sent to [facility] for evaluation and safe disposition.     Failure to thrive due to dementia  * The patient lives at home alone.  She has a home nurse visit a few times a week, set her up by her sister, who is her power of  and lives in [place].  Noted that in 2020 she had a long hospitalization at [facility] for issues including dementia and placement to TCU was planned at that time, but apparently at the last minute, was ultimately decided to take the patient home (the decision was made by her brother, Isaac, who was power of  at that time).   * Initial presentation as above.  Current POA, sister, wanted the patient in a higher level of care.  - Family has been " working on getting patient into [facility] [place] memory care, social work involved and assisting with placement.  - Management of dementia as below.  - Plan for discharge to [facility] today.  - Follow-up with PCP in one week.     Dementia with behavioral disturbance (agitation)  Depression/anxiety  * Patient with known history of dementia.  * She became agitated in the ER once it was told her that she had come to the hospital.  She was given olanzapine with improvement.   - Continue trazodone 100 mg at bedtime.  - Continue Aricept.  - Initially placed on 72-hour hold.  Per psychiatry patient does not require hold anymore. They also feel that she does not have medical decision-making capacity and recommend utilizing next of kin for decision-making.  - Family was involved in decision making and discharge planning.  - Psychiatry followed intermittently, they recommended holding fluoxetine and bupropion indefinitely.  Please see their follow-up consult note from 6/7.  - Routine delirium precautions.   - Re-orient as needed.  - Maintain normal day/night, sleep/wake cycles.     Hypothyroidism   TSH is elevated (29), free T4 is low, consistent with hypothyroidism.  - Home medication list includes levothyroxine at a reasonable dose; question compliance.  Pharmacy notes that she last filled a 90-day supply of Synthroid on 11/5/2021.   - Restarted PTA Synthroid 100 mcg daily, will continue upon discharge.  - Recheck labs in early July 2022 after 4-6 weeks of consistently taking Synthroid.     Pyuria  - Routine UA obtained in ED due to confusion, suggestive of UTI.  - Urine culture grew 10-50,000 CFU/milliliter mixture of urogenital noam.  - Initially started on empiric antibiotics which were then discontinued when culture results were available.     Consultations This Hospital Stay   CARE MANAGEMENT / SOCIAL WORK IP CONSULT  PHARMACY IP CONSULT  PSYCHIATRY IP CONSULT  IV TEAM IP CONSULT  PSYCHIATRY IP  CONSULT  PSYCHIATRY IP CONSULT  PSYCHIATRY IP CONSULT  PSYCHIATRY IP CONSULT    Code Status   Full Code    Time Spent on this Encounter   I, Ruddy Barnett MD, personally saw the patient today and spent greater than 30 minutes discharging this patient.       Ruddy Barnett MD  [facility] EXTENDED RECOVERY AND SHORT STAY  [address] [address]  Phone: [phone]  ______________________________________________________________________    Physical Exam   Vital Signs:       Pulse: 88     SpO2: 94 % O2 Device: None (Room air)    Weight: 131 lbs 11.2 oz  Constitutional: awake, alert, laying in the hospital bed  I said 'good morning' to her and then she asked me to leave the room.    Primary Care Physician   Patria Shea    Discharge Orders      Reason for your hospital stay    Failure to thrive, dementia with behavioral disturbance     Follow-up and recommended labs and tests     Follow up with primary care provider, Patria Shea, within 7 days for hospital follow- up.  The following labs/tests are recommended: recheck thyroid function tests in early July.     Activity    Your activity upon discharge: activity as tolerated     Diet    Follow this diet upon discharge:       Snacks/Supplements Adult: Ensure Enlive; With Meals      Regular Diet Adult     Significant Results and Procedures   Most Recent 3 CBC's:Recent Labs   Lab Test 06/01/22  1655 05/21/18  0000 05/14/18  0835   WBC 7.8 5.7 7.0   HGB 14.5 12.3 12.4   MCV 91 97.1 93    406 324     Most Recent 3 BMP's:Recent Labs   Lab Test 06/01/22  1655 05/14/18  0835 05/13/18  2330 05/12/18  0832 05/11/18  1220     --   --  138 138   POTASSIUM 3.5  --   --  3.7 3.6   CHLORIDE 109  --   --  108 104   CO2 25  --   --  21 27   BUN 16  --   --  12 12   CR 0.76 0.66 0.67 0.75 0.81   ANIONGAP 6  --   --  9 7   JAYE 9.2  --   --  8.1* 9.6   GLC 90  --   --  145* 100*     Most Recent TSH and T4:Recent Labs   Lab Test  06/01/22  1655   TSH 29.12*   T4 0.69*     Results for orders placed or performed during the hospital encounter of 05/11/18   MR Hand Right w/o Contrast    Narrative    MR HAND RIGHT WITHOUT CONTRAST May 12, 2018 8:31 PM     HISTORY: Dog bite to hand. Rule out abscess, has ring metacarpal  fracture.     TECHNIQUE: Coronal T1, fat-suppressed T1, fat-suppressed T2, and  inversion recovery, sagittal T1, and transverse T1 and fat-suppressed  T2-weighted pulse sequences are submitted. No contrast-enhanced images  are submitted.    COMPARISON: No radiographs are available for comparison.    FINDINGS: There is a nondisplaced or minimally displaced transverse  oblique fracture involving the fourth metacarpal proximal metaphysis.  No extension into the carpometacarpal joint is demonstrated.  Degenerative arthrosis with mild subchondral marrow edema is noted at  the first carpometacarpal joint. Scattered cysts or erosions are noted  within the carpal bones, not well evaluated with this scan. Along the  volar aspect of the distal ulna, there is an irregular shaped low  signal intensity object measuring 1.0 x 0.5 x 1.1 cm, possibly a  distal radioulnar joint or radiocarpal joint articular body versus a  foreign body, also not well seen on the edge of the scan. Adjacent  fluid is noted which may be related to a recess of the radiocarpal or  distal radioulnar joint. Extensive soft tissue edema is noted,  greatest along the dorsum of the hand, but also present within the  volar soft tissues. Complex extensor tendon sheath fluid is suggested.  This might be better delineated with contrast enhancement if  clinically warranted. However, there is no other evidence of discrete  soft tissue fluid collection or abscess. Visualized portions of the  flexor and extensor tendons appear to be grossly intact.      Impression    IMPRESSION:  1. Fluid collection along the volar aspect of the ulna possibly  associated with the distal radioulnar  joint or radiocarpal joint.  2. 1.0 x 0.5 x 1.1 cm low signal intensity structure within the fluid  volar to the ulna, possibly a large articular body versus a foreign  body. Radiographic correlation is advised.  3. Extensive soft tissue edema in the hand, greatest dorsally but also  present in the volar soft tissues. Extensor tendon sheath complex  fluid is suspected and might be better delineated with contrast  enhancement if clinically warranted. However, there is no other  definitive evidence of soft tissue abscess.  4. Fourth metacarpal proximal metaphyseal nondisplaced or minimally  displaced fracture.  5. Nonspecific cysts or erosions in multiple carpal bones, not well  evaluated with this scan.  6. First carpometacarpal joint degenerative arthrosis.    ADAMS KOENIG MD     Discharge Medications   Current Discharge Medication List      START taking these medications    Details   benztropine (COGENTIN) 1 MG tablet Take 1 tablet (1 mg) by mouth 3 times daily as needed for other (for extrapyramidal effects)  Qty: 10 tablet, Refills: 0    Associated Diagnoses: Dementia with behavioral disturbance, unspecified dementia type (H)      donepezil (ARICEPT) 5 MG tablet Take 1 tablet (5 mg) by mouth At Bedtime  Qty: 7 tablet, Refills: 0    Associated Diagnoses: Dementia with behavioral disturbance, unspecified dementia type (H)      OLANZapine zydis (ZYPREXA) 5 MG ODT Take 1 tablet (5 mg) by mouth every 6 hours as needed for agitation (aggression)  Qty: 10 tablet, Refills: 0    Associated Diagnoses: Dementia with behavioral disturbance, unspecified dementia type (H)         CONTINUE these medications which have CHANGED    Details   acetaminophen (TYLENOL) 325 MG tablet Take 1-2 tablets (325-650 mg) by mouth every 4 hours as needed for mild pain  Qty: 10 tablet, Refills: 0    Associated Diagnoses: Dog bite, initial encounter      cyanocobalamin (VITAMIN B-12) 1000 MCG tablet Take 1 tablet (1,000 mcg) by mouth  daily  Qty: 7 tablet, Refills: 0    Associated Diagnoses: Failure to thrive in adult      levothyroxine (SYNTHROID/LEVOTHROID) 100 MCG tablet Take 1 tablet (100 mcg) by mouth daily  Qty: 7 tablet, Refills: 0    Associated Diagnoses: Hypothyroidism, unspecified type      Multiple Vitamins-Minerals (EMERGEN-C VITAMIN C) PACK Take 1 packet by mouth daily  Qty: 7 each, Refills: 0    Associated Diagnoses: Failure to thrive in adult      traZODone (DESYREL) 100 MG tablet Take 1 tablet (100 mg) by mouth At Bedtime  Qty: 7 tablet, Refills: 0    Associated Diagnoses: Dementia with behavioral disturbance, unspecified dementia type (H)      Vitamin D, Cholecalciferol, 25 MCG (1000 UT) TABS Take 1 tablet (1,000 Units) by mouth daily  Qty: 7 tablet, Refills: 0    Associated Diagnoses: Failure to thrive in adult      vitamin E (TOCOPHEROL) 400 units (180 mg) capsule Take 1 capsule (400 Units) by mouth daily  Qty: 7 capsule, Refills: 0    Associated Diagnoses: Failure to thrive in adult         CONTINUE these medications which have NOT CHANGED    Details   order for DME Equipment being ordered: Walker Platform ()  Treatment Diagnosis: Difficulty with gait due to LE weakness and unable to hold walker with right hand  Qty: 1 each, Refills: 0    Associated Diagnoses: Dog bite, initial encounter         STOP taking these medications       buPROPion (WELLBUTRIN) 75 MG tablet Comments:   Reason for Stopping:         FLUOXETINE HCL PO Comments:   Reason for Stopping:             Allergies   No Known Allergies

## 2022-06-17 NOTE — PLAN OF CARE
Goal Outcome Evaluation:    Alert to self only. Labile mood, calm overnight. Up IND in room, long arm in use for agitation and impulsiveness. Refused full set of vitals, HR and O2 WDL. Denies pain. Refused HS medications. Incontinent at times. Plan to discharge to [facility] around 11am, sister Marian is mode of transportation.

## 2022-06-17 NOTE — PLAN OF CARE
Goal Outcome Evaluation:      VSS on RA. Discharge packet and medications given to pt sister. Pt discharging to [facility]. Pt discharged w/ sister.

## 2022-06-18 NOTE — PROGRESS NOTES
Clinic Care Coordination Contact    Background: Care Coordination referral placed from [facility] discharge report for reason of patient meeting criteria for a TCM outreach call by [facility] team.    Assessment: Upon chart review, [facility] Team member will cancel/close the referral for TCM outreach due to reason below:    Patient has discharged to a Group home, Memory Care or Nursing Home    Plan: Care Coordination referral for TCM outreach canceled.    Hilda Horner MA  [facility], [facility]

## 2022-10-04 PROBLEM — F03.918 DEMENTIA WITH BEHAVIORAL DISTURBANCE (H): Status: ACTIVE | Noted: 2022-01-01
